# Patient Record
Sex: FEMALE | Race: WHITE | NOT HISPANIC OR LATINO | Employment: UNEMPLOYED | URBAN - METROPOLITAN AREA
[De-identification: names, ages, dates, MRNs, and addresses within clinical notes are randomized per-mention and may not be internally consistent; named-entity substitution may affect disease eponyms.]

---

## 2017-03-29 ENCOUNTER — LAB CONVERSION - ENCOUNTER (OUTPATIENT)
Dept: OTHER | Facility: OTHER | Age: 52
End: 2017-03-29

## 2017-03-29 LAB
ADDITIONAL INFORMATION (HISTORICAL): NORMAL
BILIRUB UR QL STRIP: NEGATIVE
COLOR UR: YELLOW
COMMENT (HISTORICAL): CLEAR
DIAGNOSIS (HISTORICAL): NORMAL
FECAL OCCULT BLOOD DIAGNOSTIC (HISTORICAL): NEGATIVE
GLUCOSE (HISTORICAL): NEGATIVE
GROSS DESCRIPTION (HISTORICAL): NORMAL
KETONES UR STRIP-MCNC: NEGATIVE MG/DL
LEUKOCYTE ESTERASE UR QL STRIP: NEGATIVE
NITRITE UR QL STRIP: NEGATIVE
PATHOLOGIST (HISTORICAL): NORMAL
PH UR STRIP.AUTO: 6 [PH] (ref 5–8)
PROT UR STRIP-MCNC: NEGATIVE MG/DL
SCREEN/SCREENER (HISTORICAL): NORMAL
SITE/SOURCE (HISTORICAL): NORMAL
SP GR UR STRIP.AUTO: 1.03 (ref 1–1.03)

## 2017-04-19 ENCOUNTER — ALLSCRIPTS OFFICE VISIT (OUTPATIENT)
Dept: OTHER | Facility: OTHER | Age: 52
End: 2017-04-19

## 2017-07-06 ENCOUNTER — ALLSCRIPTS OFFICE VISIT (OUTPATIENT)
Dept: OTHER | Facility: OTHER | Age: 52
End: 2017-07-06

## 2017-07-28 ENCOUNTER — GENERIC CONVERSION - ENCOUNTER (OUTPATIENT)
Dept: OTHER | Facility: OTHER | Age: 52
End: 2017-07-28

## 2017-10-30 ENCOUNTER — GENERIC CONVERSION - ENCOUNTER (OUTPATIENT)
Dept: OTHER | Facility: OTHER | Age: 52
End: 2017-10-30

## 2018-01-13 VITALS
BODY MASS INDEX: 35.02 KG/M2 | HEIGHT: 64 IN | HEART RATE: 68 BPM | SYSTOLIC BLOOD PRESSURE: 122 MMHG | DIASTOLIC BLOOD PRESSURE: 84 MMHG | WEIGHT: 205.13 LBS

## 2018-01-13 VITALS
SYSTOLIC BLOOD PRESSURE: 120 MMHG | DIASTOLIC BLOOD PRESSURE: 80 MMHG | WEIGHT: 203.25 LBS | BODY MASS INDEX: 34.7 KG/M2 | HEIGHT: 64 IN

## 2018-04-14 LAB
APPEARANCE UR: CLEAR
BILIRUB UR QL STRIP: NEGATIVE
COLOR UR: YELLOW
GLUCOSE UR QL STRIP: NEGATIVE
HGB UR QL STRIP: NEGATIVE
KETONES UR QL STRIP: NEGATIVE
LEUKOCYTE ESTERASE UR QL STRIP: NEGATIVE
NITRITE UR QL STRIP: NEGATIVE
PH UR STRIP: NORMAL [PH] (ref 5–8)
PROT UR QL STRIP: NEGATIVE
SP GR UR STRIP: 1.02 (ref 1–1.03)

## 2018-04-16 NOTE — PROGRESS NOTES
4/19/2018    Hal Hull  1965  [id number]        Assessment  -Mixed urinary incontinence  -OAB  -Microscopic hematuria s/p negative hematuria workup in 2014    Morena Zhong is a 48 y o  female being managed by Dr Ros Grace  -PVR is 24cc  -We reviewed dietary modifications including limiting bladder irritants which include coffee, increasing oral intake with water, and practicing timed voiding  -Patient will continue to take oxybutynin 15mg daily, but we will attempt to obtain insurance coverage for Myrbetriq 50mg, as she stated marked improvement in lower urinary symptoms of urgency and urge incontinence  Patient had previously been on Detrol LA without any changes in urination  If insurance is unable to cover Myrbetriq, we will further discuss prescribing a different medication such as Toviaz and discontinuing oxybutynin  -All questions answered, patients agree with plan     History of Present Illness  48 y o  female with a history of mixed urinary incontinence, OAB, and microscopic hematuria s/p negative hematuria workup in 2014 presents today for follow up  Patient had previously taken Detrol LA but discontinued at last office visit, as she felt no change in urinary symptoms  Myrbetriq 50mg was prescribed with great success in urinary symptoms, however due to cost and insurance coverage of medication patient is unable to continue  Patient continues to take oxybutynin 15mg daily, but continues to have urinary urgency, frequency, and urge incontinence  She wears up to 3 sanitary pads/day  Denies any nocturia, dysuria, incomplete bladder emptying, or gross hematuria  Patient states rare occurrences of urinary stress incontinence with coughing/sneezing  Review of Systems  Review of Systems   Constitutional: Negative  HENT: Negative  Respiratory: Negative  Cardiovascular: Negative  Gastrointestinal: Negative      Genitourinary: Negative for difficulty urinating, dysuria, flank pain, frequency, hematuria, pelvic pain and urgency  Musculoskeletal: Negative  Skin: Negative  Neurological: Negative  Psychiatric/Behavioral: Negative  Past Medical History  No past medical history on file  Past Social History  No past surgical history on file  Past Family History  No family history on file  Past Social history  Social History     Social History    Marital status: /Civil Union     Spouse name: N/A    Number of children: N/A    Years of education: N/A     Occupational History    Not on file  Social History Main Topics    Smoking status: Not on file    Smokeless tobacco: Not on file    Alcohol use Not on file    Drug use: Unknown    Sexual activity: Not on file     Other Topics Concern    Not on file     Social History Narrative    No narrative on file       Current Medications  No current outpatient prescriptions on file  No current facility-administered medications for this visit  Allergies  Allergies not on file    Past medical history, social history, family history, medications and allergies were reviewed  Vitals  There were no vitals filed for this visit  Physical Exam  Physical Exam   Constitutional: She is oriented to person, place, and time  She appears well-developed and well-nourished  HENT:   Head: Normocephalic  Eyes: Pupils are equal, round, and reactive to light  Neck: Normal range of motion  Cardiovascular: Normal rate and regular rhythm  Pulmonary/Chest: Effort normal    Abdominal: Soft  Normal appearance  There is no CVA tenderness  Musculoskeletal: Normal range of motion  Neurological: She is alert and oriented to person, place, and time  She has normal reflexes  Skin: Skin is warm and dry  Psychiatric: She has a normal mood and affect   Her behavior is normal  Judgment and thought content normal        Results    I have personally reviewed all pertinent lab results  Lab Results Component Value Date    GLUCOSE 79 01/31/2014    CALCIUM 9 1 06/14/2016     06/14/2016    K 4 3 06/14/2016    CO2 25 06/14/2016     06/14/2016    BUN 13 06/14/2016    CREATININE 0 70 06/14/2016     Lab Results   Component Value Date    WBC NONE SEEN 07/28/2016    HGB 14 5 06/14/2016    HCT 44 0 06/14/2016    MCV 90 3 06/14/2016     06/14/2016     No results found for this or any previous visit (from the past 1 hour(s))

## 2018-04-19 ENCOUNTER — ALLSCRIPTS OFFICE VISIT (OUTPATIENT)
Dept: OTHER | Facility: OTHER | Age: 53
End: 2018-04-19

## 2018-04-19 ENCOUNTER — OFFICE VISIT (OUTPATIENT)
Dept: UROLOGY | Facility: AMBULATORY SURGERY CENTER | Age: 53
End: 2018-04-19
Payer: COMMERCIAL

## 2018-04-19 VITALS
BODY MASS INDEX: 35.34 KG/M2 | HEIGHT: 64 IN | DIASTOLIC BLOOD PRESSURE: 77 MMHG | HEART RATE: 68 BPM | SYSTOLIC BLOOD PRESSURE: 122 MMHG | WEIGHT: 207 LBS

## 2018-04-19 DIAGNOSIS — N32.81 OAB (OVERACTIVE BLADDER): Primary | ICD-10-CM

## 2018-04-19 DIAGNOSIS — R31.9 HEMATURIA: ICD-10-CM

## 2018-04-19 DIAGNOSIS — R31.29 MICROSCOPIC HEMATURIA: ICD-10-CM

## 2018-04-19 DIAGNOSIS — N39.46 MIXED STRESS AND URGE URINARY INCONTINENCE: ICD-10-CM

## 2018-04-19 PROCEDURE — 99213 OFFICE O/P EST LOW 20 MIN: CPT | Performed by: NURSE PRACTITIONER

## 2018-04-19 RX ORDER — ESCITALOPRAM OXALATE 5 MG/1
TABLET ORAL
COMMUNITY
Start: 2018-01-26 | End: 2018-12-28 | Stop reason: SDUPTHER

## 2018-04-19 RX ORDER — VALACYCLOVIR HYDROCHLORIDE 500 MG/1
TABLET, FILM COATED ORAL
COMMUNITY
Start: 2016-09-16 | End: 2018-07-09

## 2018-04-19 RX ORDER — ESCITALOPRAM OXALATE 10 MG/1
TABLET ORAL
COMMUNITY
Start: 2018-01-26 | End: 2018-12-28 | Stop reason: SDUPTHER

## 2018-04-19 RX ORDER — METOPROLOL SUCCINATE 25 MG/1
12.5 TABLET, EXTENDED RELEASE ORAL
COMMUNITY
Start: 2017-09-19 | End: 2021-07-26

## 2018-04-19 RX ORDER — CLONAZEPAM 0.5 MG/1
TABLET ORAL
COMMUNITY
Start: 2017-09-11 | End: 2018-12-28 | Stop reason: SDUPTHER

## 2018-04-19 RX ORDER — OXYBUTYNIN CHLORIDE 15 MG/1
TABLET, EXTENDED RELEASE ORAL
COMMUNITY
Start: 2017-10-09 | End: 2018-04-20 | Stop reason: ALTCHOICE

## 2018-04-20 ENCOUNTER — TELEPHONE (OUTPATIENT)
Dept: UROLOGY | Facility: AMBULATORY SURGERY CENTER | Age: 53
End: 2018-04-20

## 2018-04-20 DIAGNOSIS — N32.81 OAB (OVERACTIVE BLADDER): Primary | ICD-10-CM

## 2018-04-20 RX ORDER — FESOTERODINE FUMARATE 8 MG/1
8 TABLET, EXTENDED RELEASE ORAL DAILY
Qty: 30 TABLET | Refills: 11 | Status: SHIPPED | OUTPATIENT
Start: 2018-04-20 | End: 2018-07-09

## 2018-04-20 RX ORDER — TOLTERODINE 4 MG/1
4 CAPSULE, EXTENDED RELEASE ORAL DAILY
Qty: 30 CAPSULE | Refills: 11 | Status: SHIPPED | OUTPATIENT
Start: 2018-04-20 | End: 2018-04-20 | Stop reason: CLARIF

## 2018-04-20 NOTE — TELEPHONE ENCOUNTER
Discontinued Myrbetriq  Prescription sent to patient's pharmacy for Detrol  Patient can be seen in 1 year follow up as recent PVR was negative for any significant findings, otherwise sooner if needed

## 2018-04-20 NOTE — TELEPHONE ENCOUNTER
Darshan Hernandez  This patients Myrbetriq is approved with the insurance good from 4/20/18-4/20/19, patient was notified  Thanks  Janes Malloy    4/20 Patient called me back regarding the medication she has a 7000 deductible and the cost would be 300 dollars for the medication I did let her know that we couldn't do anything about the copay due to that being her deductible for her medications  She would like a phone call to talk about another cheaper medication    Thanks  Janes Malloy

## 2018-05-16 DIAGNOSIS — R35.0 FREQUENCY OF URINATION: Primary | ICD-10-CM

## 2018-05-16 RX ORDER — OXYBUTYNIN CHLORIDE 15 MG/1
TABLET, EXTENDED RELEASE ORAL
Qty: 30 TABLET | Refills: 0 | Status: SHIPPED | OUTPATIENT
Start: 2018-05-16 | End: 2018-07-13 | Stop reason: SDUPTHER

## 2018-07-09 ENCOUNTER — OFFICE VISIT (OUTPATIENT)
Dept: FAMILY MEDICINE CLINIC | Facility: CLINIC | Age: 53
End: 2018-07-09
Payer: COMMERCIAL

## 2018-07-09 VITALS
DIASTOLIC BLOOD PRESSURE: 76 MMHG | SYSTOLIC BLOOD PRESSURE: 132 MMHG | HEIGHT: 64 IN | WEIGHT: 209.6 LBS | BODY MASS INDEX: 35.78 KG/M2 | TEMPERATURE: 97.8 F

## 2018-07-09 DIAGNOSIS — L50.9 HIVES: ICD-10-CM

## 2018-07-09 DIAGNOSIS — T78.40XA ALLERGIC DISORDER, INITIAL ENCOUNTER: Primary | ICD-10-CM

## 2018-07-09 PROCEDURE — 3008F BODY MASS INDEX DOCD: CPT | Performed by: FAMILY MEDICINE

## 2018-07-09 PROCEDURE — 99213 OFFICE O/P EST LOW 20 MIN: CPT | Performed by: FAMILY MEDICINE

## 2018-07-09 RX ORDER — FLUTICASONE PROPIONATE 0.05 %
CREAM (GRAM) TOPICAL 2 TIMES DAILY
Qty: 30 G | Refills: 0 | Status: SHIPPED | OUTPATIENT
Start: 2018-07-09 | End: 2019-09-17 | Stop reason: ALTCHOICE

## 2018-07-09 NOTE — PROGRESS NOTES
Assessment/Plan:           Problem List Items Addressed This Visit     None      Visit Diagnoses     Allergic disorder, initial encounter    -  Primary    Relevant Medications    fluticasone (CUTIVATE) 0 05 % cream    Hives        Relevant Medications    fluticasone (CUTIVATE) 0 05 % cream            Subjective:      Patient ID: Lisa Sims is a 48 y o  female  Patient presents with: Insect Bite: Patient noticed tick on upper right thigh about one week ago, she has tick bottled with her  Rash: Patient states that 2-3 days after taking tick off thigh she noticed raised, itchy rash on left knee and subsequently on left upper arm          Insect Bite   Associated symptoms include a rash  Rash         The following portions of the patient's history were reviewed and updated as appropriate: allergies, current medications, past family history, past medical history, past social history, past surgical history and problem list     Review of Systems   Constitutional: Negative  HENT: Negative  Eyes: Negative  Respiratory: Negative  Cardiovascular: Negative  Gastrointestinal: Negative  Endocrine: Negative  Genitourinary: Negative  Musculoskeletal: Negative  Skin: Positive for rash  Allergic/Immunologic: Negative  Neurological: Negative  Hematological: Negative  Psychiatric/Behavioral: Negative  All other systems reviewed and are negative  Objective:      /76 (BP Location: Right arm, Patient Position: Sitting, Cuff Size: Adult)   Temp 97 8 °F (36 6 °C) (Tympanic)   Ht 5' 4 25" (1 632 m)   Wt 95 1 kg (209 lb 9 6 oz)   LMP 06/25/2018 (Approximate)   BMI 35 70 kg/m²          Physical Exam   Constitutional: She is oriented to person, place, and time  She appears well-developed and well-nourished  HENT:   Head: Normocephalic and atraumatic     Right Ear: External ear normal    Left Ear: External ear normal    Nose: Nose normal    Mouth/Throat: Oropharynx is clear and moist    Eyes: Conjunctivae and EOM are normal  Pupils are equal, round, and reactive to light  Neck: Normal range of motion  Neck supple  Cardiovascular: Normal rate, regular rhythm and normal heart sounds  Pulmonary/Chest: Effort normal and breath sounds normal    Abdominal: Soft  Bowel sounds are normal    Musculoskeletal: Normal range of motion  Neurological: She is alert and oriented to person, place, and time  She has normal reflexes  Skin: Skin is warm and dry  Rash noted  Psychiatric: She has a normal mood and affect  Her behavior is normal    Nursing note and vitals reviewed

## 2018-07-13 DIAGNOSIS — R35.0 FREQUENCY OF URINATION: ICD-10-CM

## 2018-07-13 RX ORDER — OXYBUTYNIN CHLORIDE 15 MG/1
TABLET, EXTENDED RELEASE ORAL
Qty: 30 TABLET | Refills: 0 | Status: SHIPPED | OUTPATIENT
Start: 2018-07-13 | End: 2018-09-14 | Stop reason: SDUPTHER

## 2018-09-14 DIAGNOSIS — R35.0 FREQUENCY OF URINATION: ICD-10-CM

## 2018-09-17 RX ORDER — OXYBUTYNIN CHLORIDE 15 MG/1
TABLET, EXTENDED RELEASE ORAL
Qty: 30 TABLET | Refills: 0 | Status: SHIPPED | OUTPATIENT
Start: 2018-09-17 | End: 2018-11-19 | Stop reason: SDUPTHER

## 2018-11-19 DIAGNOSIS — R35.0 FREQUENCY OF URINATION: ICD-10-CM

## 2018-11-19 RX ORDER — OXYBUTYNIN CHLORIDE 15 MG/1
TABLET, EXTENDED RELEASE ORAL
Qty: 30 TABLET | Refills: 0 | Status: SHIPPED | OUTPATIENT
Start: 2018-11-19 | End: 2019-01-07 | Stop reason: SDUPTHER

## 2018-11-26 ENCOUNTER — OFFICE VISIT (OUTPATIENT)
Dept: FAMILY MEDICINE CLINIC | Facility: CLINIC | Age: 53
End: 2018-11-26
Payer: COMMERCIAL

## 2018-11-26 VITALS
BODY MASS INDEX: 33.12 KG/M2 | TEMPERATURE: 98 F | DIASTOLIC BLOOD PRESSURE: 82 MMHG | HEIGHT: 64 IN | SYSTOLIC BLOOD PRESSURE: 110 MMHG | WEIGHT: 194 LBS

## 2018-11-26 DIAGNOSIS — T78.40XA ALLERGIC REACTION, INITIAL ENCOUNTER: Primary | ICD-10-CM

## 2018-11-26 PROCEDURE — 3008F BODY MASS INDEX DOCD: CPT | Performed by: FAMILY MEDICINE

## 2018-11-26 PROCEDURE — 1036F TOBACCO NON-USER: CPT | Performed by: FAMILY MEDICINE

## 2018-11-26 PROCEDURE — 99213 OFFICE O/P EST LOW 20 MIN: CPT | Performed by: FAMILY MEDICINE

## 2018-11-26 NOTE — PROGRESS NOTES
Assessment/Plan:   I suggested she continue to use Benadryl or a nonsedating antihistamine  If the symptoms worsen the arm becomes more painful red or swollen she should return to office  She is still considering getting the second shingles vaccine  I did recommend she premedicate with 50 mg of Benadryl before she gets the shot  She should not drive to the pharmacy but have her  or someone else drive her  She should make sure she is with someone the entire day after receiving the musician  Follow up here as needed  Diagnoses and all orders for this visit:    Allergic reaction, initial encounter          Subjective:      Patient ID: Jr Pritchett is a 48 y o  female  Patient presents with:  Pain: Patient states she had the shingrix shot on Tuesday  She states two days after she had the shot administered her left arm started redness and swelling  Its hot to the touch with pain and numbness  She states it has been spreading down her arm from the injection site down past the elbow  The following portions of the patient's history were reviewed and updated as appropriate: allergies, current medications, past family history, past medical history, past social history, past surgical history and problem list     Review of Systems   Constitutional: Negative  HENT: Negative  Eyes: Negative  Respiratory: Negative  Cardiovascular: Negative  Gastrointestinal: Negative  Endocrine: Negative  Genitourinary: Negative  Musculoskeletal: Negative  Left arm pain and swelling  Skin: Negative  Allergic/Immunologic: Negative  Neurological: Negative  Hematological: Negative  Psychiatric/Behavioral: Negative  All other systems reviewed and are negative          Objective:      /82 (BP Location: Right arm, Patient Position: Sitting, Cuff Size: Large)   Temp 98 °F (36 7 °C)   Ht 5' 4 25" (1 632 m)   Wt 88 kg (194 lb)   BMI 33 04 kg/m² Physical Exam   Constitutional: She is oriented to person, place, and time  She appears well-developed and well-nourished  HENT:   Head: Normocephalic and atraumatic  Right Ear: External ear normal    Left Ear: External ear normal    Nose: Nose normal    Mouth/Throat: Oropharynx is clear and moist    Eyes: Pupils are equal, round, and reactive to light  Conjunctivae and EOM are normal    Neck: Normal range of motion  Neck supple  Cardiovascular: Normal rate, regular rhythm and normal heart sounds  Pulmonary/Chest: Effort normal and breath sounds normal    Abdominal: Soft  Bowel sounds are normal    Musculoskeletal: Normal range of motion  Left arm is swollen in the area of the triceps  There is some erythema  Neurovascular appears intact  Neurological: She is alert and oriented to person, place, and time  She has normal reflexes  Skin: Skin is warm and dry  There is erythema  Psychiatric: She has a normal mood and affect  Her behavior is normal    Nursing note and vitals reviewed

## 2018-12-28 ENCOUNTER — OFFICE VISIT (OUTPATIENT)
Dept: FAMILY MEDICINE CLINIC | Facility: CLINIC | Age: 53
End: 2018-12-28
Payer: COMMERCIAL

## 2018-12-28 VITALS
DIASTOLIC BLOOD PRESSURE: 76 MMHG | WEIGHT: 189 LBS | TEMPERATURE: 98.5 F | HEIGHT: 64 IN | BODY MASS INDEX: 32.27 KG/M2 | SYSTOLIC BLOOD PRESSURE: 112 MMHG

## 2018-12-28 DIAGNOSIS — Z23 ENCOUNTER FOR VACCINATION: Primary | ICD-10-CM

## 2018-12-28 DIAGNOSIS — G89.29 CHRONIC PAIN OF RIGHT ANKLE: ICD-10-CM

## 2018-12-28 DIAGNOSIS — F41.0 PANIC DISORDER: ICD-10-CM

## 2018-12-28 DIAGNOSIS — M25.571 CHRONIC PAIN OF RIGHT ANKLE: ICD-10-CM

## 2018-12-28 DIAGNOSIS — F33.1 MAJOR DEPRESSIVE DISORDER, RECURRENT EPISODE, MODERATE (HCC): ICD-10-CM

## 2018-12-28 PROBLEM — S86.112A STRAIN OF GASTROCNEMIUS MUSCLE OF LEFT LOWER EXTREMITY: Status: ACTIVE | Noted: 2017-07-06

## 2018-12-28 PROBLEM — I49.3 ASYMPTOMATIC PVCS: Status: ACTIVE | Noted: 2018-10-02

## 2018-12-28 PROBLEM — R32 URINARY INCONTINENCE IN FEMALE: Status: ACTIVE | Noted: 2017-10-09

## 2018-12-28 PROCEDURE — 99214 OFFICE O/P EST MOD 30 MIN: CPT | Performed by: FAMILY MEDICINE

## 2018-12-28 PROCEDURE — 1036F TOBACCO NON-USER: CPT | Performed by: FAMILY MEDICINE

## 2018-12-28 RX ORDER — ESCITALOPRAM OXALATE 10 MG/1
10 TABLET ORAL DAILY
Qty: 90 TABLET | Refills: 1 | Status: SHIPPED | OUTPATIENT
Start: 2018-12-28 | End: 2019-09-23 | Stop reason: SDUPTHER

## 2018-12-28 RX ORDER — ESCITALOPRAM OXALATE 5 MG/1
5 TABLET ORAL DAILY
Qty: 90 TABLET | Refills: 1 | Status: SHIPPED | OUTPATIENT
Start: 2018-12-28 | End: 2019-09-23 | Stop reason: SDUPTHER

## 2018-12-28 RX ORDER — CLONAZEPAM 0.5 MG/1
0.5 TABLET ORAL 2 TIMES DAILY
Qty: 60 TABLET | Refills: 0 | Status: SHIPPED | OUTPATIENT
Start: 2018-12-28 | End: 2021-07-26

## 2018-12-28 NOTE — PROGRESS NOTES
Assessment/Plan:    70-year-old woman with:  Right ankle pain, major depressive disorder and panic disorder  Discussed workup and treatment options with risks and benefits  Will continue current medications  Discussed heat and cold stretching and anti-inflammatories  Will check x-ray and refer to physical therapy and advised to call back if not improving or worsening    No problem-specific Assessment & Plan notes found for this encounter  Diagnoses and all orders for this visit:    Encounter for vaccination    Major depressive disorder, recurrent episode, moderate (HCC)  -     escitalopram (LEXAPRO) 5 mg tablet; Take 1 tablet (5 mg total) by mouth daily for 90 days  -     escitalopram (LEXAPRO) 10 mg tablet; Take 1 tablet (10 mg total) by mouth daily  -     clonazePAM (KlonoPIN) 0 5 mg tablet; Take 1 tablet (0 5 mg total) by mouth 2 (two) times a day    Chronic pain of right ankle  -     XR ankle 3+ vw right; Future  -     Ambulatory referral to Physical Therapy; Future    Panic disorder    Other orders  -     Cancel: PNEUMOCOCCAL POLYSACCHARIDE VACCINE 23-VALENT =>1YO SQ IM          Subjective:     Chief Complaint   Patient presents with    Ankle Pain     Patient states she has been having right ankle pain for the past two years with no known cause of injury  She states the pain has gotten steadily worse for the past 4 months   Medication Refill     Patient requests a refill on lexapro and Klonopin because her doctor moved  Patient ID: Parisa Patrick is a 48 y o  female  Patient is a 70-year-old woman who presents complaining of 2 years of right ankle pain intermittently gradually getting worse  No weakness numbness or tingling  No specific precipitating trauma  Patient tried some anti-inflammatories which provided little relief  She also has major depressive disorder and panic disorder and admits that she needs refills of her medications    She admits being stable on them and denies other complaints at this time  The following portions of the patient's history were reviewed and updated as appropriate: allergies, current medications, past family history, past medical history, past social history, past surgical history and problem list     Review of Systems   Constitutional: Negative  HENT: Negative  Eyes: Negative  Respiratory: Negative  Cardiovascular: Negative  Gastrointestinal: Negative  Endocrine: Negative  Genitourinary: Negative  Musculoskeletal: Positive for arthralgias  Allergic/Immunologic: Negative  Neurological: Negative  Hematological: Negative  Psychiatric/Behavioral: The patient is nervous/anxious  All other systems reviewed and are negative  Objective:      /76 (BP Location: Left arm, Patient Position: Sitting, Cuff Size: Large)   Temp 98 5 °F (36 9 °C)   Ht 5' 4 25" (1 632 m)   Wt 85 7 kg (189 lb)   BMI 32 19 kg/m²          Physical Exam   Constitutional: She is oriented to person, place, and time  She appears well-developed and well-nourished  HENT:   Head: Atraumatic  Right Ear: External ear normal    Left Ear: External ear normal    Eyes: Pupils are equal, round, and reactive to light  Conjunctivae and EOM are normal    Neck: Normal range of motion  Pulmonary/Chest: Effort normal  No respiratory distress  Musculoskeletal: Normal range of motion  Right ankle tenderness around the medial malleolus  Tenderness with pronation  Neurological: She is alert and oriented to person, place, and time  No cranial nerve deficit  Skin: Skin is warm and dry  Psychiatric: She has a normal mood and affect   Her behavior is normal  Judgment and thought content normal

## 2019-01-03 ENCOUNTER — EVALUATION (OUTPATIENT)
Dept: PHYSICAL THERAPY | Facility: CLINIC | Age: 54
End: 2019-01-03
Payer: COMMERCIAL

## 2019-01-03 DIAGNOSIS — M25.571 CHRONIC PAIN OF RIGHT ANKLE: Primary | ICD-10-CM

## 2019-01-03 DIAGNOSIS — G89.29 CHRONIC PAIN OF RIGHT ANKLE: Primary | ICD-10-CM

## 2019-01-03 PROCEDURE — 97140 MANUAL THERAPY 1/> REGIONS: CPT | Performed by: PHYSICAL MEDICINE & REHABILITATION

## 2019-01-03 PROCEDURE — G8978 MOBILITY CURRENT STATUS: HCPCS | Performed by: PHYSICAL MEDICINE & REHABILITATION

## 2019-01-03 PROCEDURE — 97162 PT EVAL MOD COMPLEX 30 MIN: CPT | Performed by: PHYSICAL MEDICINE & REHABILITATION

## 2019-01-03 PROCEDURE — G8979 MOBILITY GOAL STATUS: HCPCS | Performed by: PHYSICAL MEDICINE & REHABILITATION

## 2019-01-03 NOTE — PROGRESS NOTES
PT Evaluation     Today's date: 1/3/2019  Patient name: Rolando Richard  : 1965  MRN: [id number]  Referring provider: Eriberto Collado MD  Dx:   Encounter Diagnosis     ICD-10-CM    1  Chronic pain of right ankle M25 571 Ambulatory referral to Physical Therapy    G89 29                   Assessment  Assessment details: Patient is a 48 y o  female who reports to outpatient physical therapy with right chronic ankle pain  No further referral appears necessary at this time based upon examination results  Probable movement impairment diagnosis of ankle hypomobility with lumbar decreased muscle coordination resulting in pathoanatomical symptoms of pain, decreased ROM, decreased weight bearing and decreased strength which are limiting her ability to exercise - walking - yard work and stairs  Skilled physical therapy is warranted for the application of the interventions found below in the planned intervention section  Prognosis is good given HEP compliance and attendance to physical therapy 2x for 8 weeks  Please contact me if you have any questions or recommendations  Thank you for the referral and the opportunity to share in [facility]  Patient verbalized understanding of POC, HEP, and return demonstrated HEP  Impairments: abnormal coordination, abnormal gait, abnormal muscle firing, abnormal or restricted ROM, abnormal movement, activity intolerance, impaired balance, impaired physical strength, lacks appropriate home exercise program, pain with function and weight-bearing intolerance  Understanding of Dx/Px/POC: good   Prognosis: good    Goals  Impairment Goals  - Decrease pain by 50% within 4 weeks  - Increase right flexibility by 50% within 4 weeks  - Increase right lower extremity strength golbally to 4+/5 within 4 weeks  Functional Goals  - Return to Prior Level of Function within 8 weeks  - Patient will be independent with HEP within 8 weeks      Plan  Patient would benefit from: skilled PT  Referral necessary: No  Planned modality interventions: cryotherapy, thermotherapy: hydrocollator packs and TENS  Planned therapy interventions: joint mobilization, manual therapy, neuromuscular re-education, patient education, strengthening, stretching, therapeutic activities, therapeutic exercise, home exercise program, functional ROM exercises, Bai taping, postural training, massage, balance and flexibility  Frequency: 2x week (2-3x week)  Duration in weeks: 6  Treatment plan discussed with: patient        Subjective Evaluation    History of Present Illness  Mechanism of injury: Work/School: not currently working  Home Life: cooking, cleaning, yard work, sometimes has to stand for long periods of time,   Hobbies/exercise: yoga, walking,   Pain location: posterior and medial ankle, superior to medial malleolus  HPI: Had ankle pain two years ago  The pain went away but then came back worse than previously     Aggravating factors: walking in the morning, sitting for longer periods of time, will sometimes feel the pain in the posterior of the calf, does not seem impacted by foot wear,    Relieving factors: 2 allieve in morning - but hasn't done that consistently  PMH: hyperlipidemia, lumbar pain,   Pain  Current pain ratin  At best pain ratin  At worst pain ratin    Patient Goals  Patient goal: be able to do more without having pain        Objective     Static Posture     Comments  Ankle ROM: PROM  DF: R: 4 L: 8  EV: R: 8 L: 15  IV: R: 16 L: 25  PF: R: 27 L: WNL    MMTs:  DF: R: 4/5 L: 5/5  EV: R: 4/5  L: 5/5  IV: R: 3+/5 L: 5/5  PF: R: 3+/5  L: 4/5     Clinical examination test:  Anterior drawer: -  [place] Ankle Rules: -  Windlass: - for pain and for arch creation    Palpation:  Tenderness medial malleolus    Lumbar screening due to subjective report of hx of lumbar pain:  Flexion: 25% limited - increased ankle pain  Extension 25% limited - increased ankle pain    Repeated motions  Prone extension - decreased ankle pain       Flowsheet Rows      Most Recent Value   PT/OT G-Codes   Current Score  66   Projected Score  75   FOTO information reviewed  Yes   Assessment Type  Evaluation   G code set  Mobility: Walking & Moving Around   Mobility: Walking and Moving Around Current Status ()  CJ   Mobility: Walking and Moving Around Goal Status ()  CH          Precautions: none    Daily Treatment Diary     Manual  1/3/2019            IASTM 10'                                                                    Exercise Diary                                                                                                                                                                                                                                                                     HEP return demonstration and progression 10'                Modalities

## 2019-01-07 ENCOUNTER — OFFICE VISIT (OUTPATIENT)
Dept: PHYSICAL THERAPY | Facility: CLINIC | Age: 54
End: 2019-01-07
Payer: COMMERCIAL

## 2019-01-07 DIAGNOSIS — G89.29 CHRONIC PAIN OF RIGHT ANKLE: Primary | ICD-10-CM

## 2019-01-07 DIAGNOSIS — M25.571 CHRONIC PAIN OF RIGHT ANKLE: Primary | ICD-10-CM

## 2019-01-07 DIAGNOSIS — R35.0 FREQUENCY OF URINATION: ICD-10-CM

## 2019-01-07 PROCEDURE — 97112 NEUROMUSCULAR REEDUCATION: CPT | Performed by: PHYSICAL MEDICINE & REHABILITATION

## 2019-01-07 RX ORDER — OXYBUTYNIN CHLORIDE 15 MG/1
TABLET, EXTENDED RELEASE ORAL
Qty: 30 TABLET | Refills: 0 | Status: SHIPPED | OUTPATIENT
Start: 2019-01-07 | End: 2019-03-18 | Stop reason: SDUPTHER

## 2019-01-07 NOTE — PROGRESS NOTES
Daily Note     Today's date: 2019  Patient name: Rolando Richard  : 1965  MRN: [id number]  Referring provider: Eriberto Collado MD  Dx:   Encounter Diagnosis     ICD-10-CM    1  Chronic pain of right ankle M25 571     G89 29                 Subjective: Nitish ahn reported "I am having some pain, but not as much as before "    Objective: See treatment diary below  Precautions: none    Daily Treatment Diary     Manual  1/3/2019 2019           IASTM 10'            Overpressure REIP  8'                                                      Exercise Diary              REIP  3x10           Self ankle DF mobs  2x10           Neural glides  3x10           TrA contractions  3x10                                                                                                                                                                                                              HEP return demonstration and progression 10'                Modalities                                                         Assessment: Tolerated treatment well  Patient would benefit from continued PT  Rolando Richard was able to initiate her therapy program which shows progress towards her goals  She continues to demonstrate signs and symptoms of posterior derangement and her symptoms are alleviated via extension bias exercises  Plan: Continue per plan of care

## 2019-01-10 ENCOUNTER — OFFICE VISIT (OUTPATIENT)
Dept: PHYSICAL THERAPY | Facility: CLINIC | Age: 54
End: 2019-01-10
Payer: COMMERCIAL

## 2019-01-10 DIAGNOSIS — M25.571 CHRONIC PAIN OF RIGHT ANKLE: Primary | ICD-10-CM

## 2019-01-10 DIAGNOSIS — G89.29 CHRONIC PAIN OF RIGHT ANKLE: Primary | ICD-10-CM

## 2019-01-10 PROCEDURE — 97140 MANUAL THERAPY 1/> REGIONS: CPT | Performed by: PHYSICAL MEDICINE & REHABILITATION

## 2019-01-10 NOTE — PROGRESS NOTES
Daily Note     Today's date: 1/10/2019  Patient name: Masoud Hanley  : 1965  MRN: [id number]  Referring provider: Alejo Long MD  Dx:   Encounter Diagnosis     ICD-10-CM    1  Chronic pain of right ankle M25 571     G89 29                 Subjective: Savita Rodney reported "I had a rough couple last few days "    Objective: See treatment diary below  Precautions: none    Daily Treatment Diary     Manual  1/3/2019 2019 1/10/2019          IASTM 10'  10'          Overpressure REIP  8'           Ankle distraction   JR                                        Exercise Diary              REIP  3x10           Self ankle DF mobs  2x10 On hold          Neural glides  3x10 3x10          TrA contractions  3x10 3x10          Calf stretch   3x20"                                                                                                                                                                                                HEP return demonstration and progression 10'                Modalities                                                         Assessment: Tolerated treatment well  Patient would benefit from continued PT  Savita Rodney was able to add a calf stretch to her program and reported "I feel better after being here than I did before " She continues to demonstrate decreased ankle DF but demonstrates increased proper gait following treatment  Plan: Continue per plan of care

## 2019-01-14 ENCOUNTER — OFFICE VISIT (OUTPATIENT)
Dept: PHYSICAL THERAPY | Facility: CLINIC | Age: 54
End: 2019-01-14
Payer: COMMERCIAL

## 2019-01-14 DIAGNOSIS — M25.571 CHRONIC PAIN OF RIGHT ANKLE: Primary | ICD-10-CM

## 2019-01-14 DIAGNOSIS — G89.29 CHRONIC PAIN OF RIGHT ANKLE: Primary | ICD-10-CM

## 2019-01-14 PROCEDURE — 97140 MANUAL THERAPY 1/> REGIONS: CPT | Performed by: PHYSICAL MEDICINE & REHABILITATION

## 2019-01-14 NOTE — PROGRESS NOTES
Daily Note     Today's date: 2019  Patient name: Roel John  : 1965  MRN: [id number]  Referring provider: Thomas Montgomery MD  Dx:   Encounter Diagnosis     ICD-10-CM    1  Chronic pain of right ankle M25 571     G89 29                   Subjective: Quiana Oscar reported "I am doing better today than I was the last visit "      Objective: See treatment diary below  Precautions: none    Daily Treatment Diary     Manual  1/3/2019 2019 1/10/2019 2019         IASTM 10'  10'          Overpressure REIP  8'           Ankle distraction   Jami Rivas         STM lumbar spine    JR                          Exercise Diary              REIP  3x10  3x10         Self ankle DF mobs  2x10 On hold          Neural glides  3x10 3x10 3x10         TrA contractions  3x10 3x10 3x10         Calf stretch   3x20" 3x20"         Seated Hamstring stretch    3x30"                                                                                                                                                                                  HEP return demonstration and progression 10'                Modalities                                                         Assessment: Tolerated treatment well  Patient would benefit from continued PT  Quiana Oscar was able to add seated hamstring stretches to her therapy program which shows progress towards her goals  She continues to report ankle relief following extension exercises  During today's session she report relief at her back with ankle distraction  Plan: Continue per plan of care  Next session consider lumbar distraction techniques

## 2019-01-17 ENCOUNTER — OFFICE VISIT (OUTPATIENT)
Dept: PHYSICAL THERAPY | Facility: CLINIC | Age: 54
End: 2019-01-17
Payer: COMMERCIAL

## 2019-01-17 DIAGNOSIS — G89.29 CHRONIC PAIN OF RIGHT ANKLE: Primary | ICD-10-CM

## 2019-01-17 DIAGNOSIS — M25.571 CHRONIC PAIN OF RIGHT ANKLE: Primary | ICD-10-CM

## 2019-01-17 PROCEDURE — 97140 MANUAL THERAPY 1/> REGIONS: CPT | Performed by: PHYSICAL MEDICINE & REHABILITATION

## 2019-01-17 NOTE — PROGRESS NOTES
Daily Note     Today's date: 2019  Patient name: Haris Hampton  : 1965  MRN: [id number]  Referring provider: Neela Santoyo MD  Dx:   Encounter Diagnosis     ICD-10-CM    1  Chronic pain of right ankle M25 571     G89 29        Start Time: 930  Stop Time: 1001  Total time in clinic (min): 31 minutes    Subjective: Louise Mccartney reported "My ankle doesn't seem better than last time "    Objective: See treatment diary below  Precautions: none    Daily Treatment Diary     Manual  1/3/2019 2019 1/10/2019 2019 2019        IASTM 10'  10'          Overpressure REIP  8'           Ankle distraction   Waneta Dace        STM lumbar spine    Mart Douse                         Exercise Diary              REIP  3x10  3x10         Self ankle DF mobs  2x10 On hold          Neural glides  3x10 3x10 3x10 3x10        TrA contractions  3x10 3x10 3x10 3x10        Calf stretch   3x20" 3x20" 3x20"        Seated Hamstring stretch    3x30" 3x30"        Double knee to chest     3x10                                                                                                                                                                    HEP return demonstration and progression 10'                Modalities                                                         Assessment: Tolerated treatment well  Patient would benefit from continued PT  Louise Mccartney was able to add double knee to chest to her therapy program which shows progress towards her goals  Following double knee to chest she reported decreased pain while walking  Plan: Continue per plan of care

## 2019-01-21 ENCOUNTER — OFFICE VISIT (OUTPATIENT)
Dept: PHYSICAL THERAPY | Facility: CLINIC | Age: 54
End: 2019-01-21
Payer: COMMERCIAL

## 2019-01-21 DIAGNOSIS — G89.29 CHRONIC PAIN OF RIGHT ANKLE: Primary | ICD-10-CM

## 2019-01-21 DIAGNOSIS — M25.571 CHRONIC PAIN OF RIGHT ANKLE: Primary | ICD-10-CM

## 2019-01-21 PROCEDURE — 97140 MANUAL THERAPY 1/> REGIONS: CPT | Performed by: PHYSICAL MEDICINE & REHABILITATION

## 2019-01-21 NOTE — PROGRESS NOTES
Daily Note     Today's date: 2019  Patient name: Shahana Stringer  : 1965  MRN: [id number]  Referring provider: Malik Lockhart MD  Dx:   Encounter Diagnosis     ICD-10-CM    1  Chronic pain of right ankle M25 571     G89 29                   Subjective: Nitish ahn reported "I had a confusing weekend, sometimes the exercises helped and sometimes it didn't "    Objective: See treatment diary below  Precautions: none    Daily Treatment Diary     Manual  1/3/2019 2019 1/10/2019 2019 2019 2019       IASTM 10'  10'          Overpressure REIP  8'           Ankle distraction   May Addison       STM lumbar spine    JR JR        Lateral hip distraction      JR           Exercise Diary              REIP  3x10  3x10         Self ankle DF mobs  2x10 On hold          Neural glides  3x10 3x10 3x10 3x10 3x10       TrA contractions  3x10 3x10 3x10 3x10 3x10       Calf stretch   3x20" 3x20" 3x20"        Seated Hamstring stretch    3x30" 3x30"        Double knee to chest     3x10 3x10                                                                                                                                                                   HEP return demonstration and progression 10'                Modalities                                                         Assessment: Tolerated treatment well  Patient would benefit from continued PT  Nitish ahn was able to add seated flexion to her program and reported a decrease in sensation along her medial ankle which shows progress towards her goals  Next session consider right leg on chair repeated flexion  Plan: Continue per plan of care

## 2019-01-24 ENCOUNTER — OFFICE VISIT (OUTPATIENT)
Dept: PHYSICAL THERAPY | Facility: CLINIC | Age: 54
End: 2019-01-24
Payer: COMMERCIAL

## 2019-01-24 DIAGNOSIS — M25.571 CHRONIC PAIN OF RIGHT ANKLE: Primary | ICD-10-CM

## 2019-01-24 DIAGNOSIS — G89.29 CHRONIC PAIN OF RIGHT ANKLE: Primary | ICD-10-CM

## 2019-01-24 PROCEDURE — 97140 MANUAL THERAPY 1/> REGIONS: CPT | Performed by: PHYSICAL MEDICINE & REHABILITATION

## 2019-01-24 NOTE — PROGRESS NOTES
Daily Note     Today's date: 2019  Patient name: Lay Elder  : 1965  MRN: [id number]  Referring provider: Christel Mccartney MD  Dx:   Encounter Diagnosis     ICD-10-CM    1  Chronic pain of right ankle M25 571     G89 29                   Subjective: Asad Beard reported "I have felt better the last few days, the pain is still there but it is not as bad as it was "    Objective: See treatment diary below  Precautions: none    Daily Treatment Diary     Manual  1/3/2019 2019 1/10/2019 2019 2019 2019 2019      IASTM 10'  10'          Overpressure REIP  8'           Ankle distraction   JR Ruben Stover      [facility] lumbar spine    JR SCOTT        Lateral hip distraction      Glenys Mcfadden          Exercise Diary              REIP  3x10  3x10         Self ankle DF mobs  2x10 On hold          Neural glides  3x10 3x10 3x10 3x10 3x10 3x10      TrA contractions  3x10 3x10 3x10 3x10 3x10 3x10      Calf stretch   3x20" 3x20" 3x20"  3x20" with a strap      Seated Hamstring stretch    3x30" 3x30"        Double knee to chest     3x10 3x10 3x10      Standing Right hip flexion lumbar flexion       3x10                                                                                                                                                     HEP return demonstration and progression 10'                Modalities                                                         Assessment: Tolerated treatment well  Patient would benefit from continued PT  Asad Beard was able to add standing lumbar flexion and reported a decrease in symptoms following the new exercise which shows progress towards her goals  She is currently reporting centralization with flexion based exercises  Plan: Continue per plan of care

## 2019-01-28 ENCOUNTER — OFFICE VISIT (OUTPATIENT)
Dept: PHYSICAL THERAPY | Facility: CLINIC | Age: 54
End: 2019-01-28
Payer: COMMERCIAL

## 2019-01-28 DIAGNOSIS — G89.29 CHRONIC PAIN OF RIGHT ANKLE: Primary | ICD-10-CM

## 2019-01-28 DIAGNOSIS — M25.571 CHRONIC PAIN OF RIGHT ANKLE: Primary | ICD-10-CM

## 2019-01-28 PROCEDURE — 97140 MANUAL THERAPY 1/> REGIONS: CPT | Performed by: PHYSICAL MEDICINE & REHABILITATION

## 2019-01-28 NOTE — PROGRESS NOTES
Daily Note     Today's date: 2019  Patient name: Gerardo Greco  : 1965  MRN: [id number]  Referring provider: Hui Lr MD  Dx:   Encounter Diagnosis     ICD-10-CM    1  Chronic pain of right ankle M25 571     G89 29                 Subjective: Salvador Valero reported "I did not have a good weekend, my foot bothered me again "    Objective: See treatment diary below  Precautions: none    Daily Treatment Diary     Manual  1/3/2019 2019 1/10/2019 2019 2019 2019 2019 2019     IASTM 10'  10'          Overpressure REIP  8'           Ankle distraction   JR Dyan Ibanez      STM lumbar spine    JR JR        Manual Lumbar flex        JR 1' holds     Lateral hip distraction      Sung Priestly         Exercise Diary              REIP  3x10  3x10         Self ankle DF mobs  2x10 On hold          Neural glides  3x10 3x10 3x10 3x10 3x10 3x10 3x10     TrA contractions  3x10 3x10 3x10 3x10 3x10 3x10 3x10     Calf stretch   3x20" 3x20" 3x20"  3x20" with a strap 3x20"     Seated Hamstring stretch    3x30" 3x30"        Double knee to chest     3x10 3x10 3x10 5x10     Standing Right hip flexion lumbar flexion       3x10 2x10                                                                                                                                                    HEP return demonstration and progression 10'                Modalities                                                         Assessment: Tolerated treatment well  Patient would benefit from continued PT  Salvador Valero reported centralization of her symptoms to her achilles following double knee to chest being held for 1' and lowering the left leg back down to the table first  Her HEP was decreased to focus only on DKTC and neural glides  Using additional lumbar support while driving and sitting was also removed  Plan: Continue per plan of care

## 2019-01-31 ENCOUNTER — OFFICE VISIT (OUTPATIENT)
Dept: PHYSICAL THERAPY | Facility: CLINIC | Age: 54
End: 2019-01-31
Payer: COMMERCIAL

## 2019-01-31 DIAGNOSIS — M25.571 CHRONIC PAIN OF RIGHT ANKLE: Primary | ICD-10-CM

## 2019-01-31 DIAGNOSIS — G89.29 CHRONIC PAIN OF RIGHT ANKLE: Primary | ICD-10-CM

## 2019-01-31 PROCEDURE — 97140 MANUAL THERAPY 1/> REGIONS: CPT | Performed by: PHYSICAL MEDICINE & REHABILITATION

## 2019-01-31 NOTE — PROGRESS NOTES
Daily Note     Today's date: 2019  Patient name: Patricia Reyes  : 1965  MRN: [id number]  Referring provider: Tyson Bui MD  Dx:   Encounter Diagnosis     ICD-10-CM    1  Chronic pain of right ankle M25 571     G89 29                   Subjective: Ceasar Abreu reported "My pain is at a 4 today "    Objective: See treatment diary below  Precautions: none    Daily Treatment Diary     Manual  1/3/2019 2019 1/10/2019 2019 2019 2019 2019 2019 2019    IASTM 10'  10'          Overpressure REIP  8'           Ankle distraction   JR Luplucas Holly  D/Michael    STM lumbar spine    JR JR        Manual Lumbar flex        JR 1' holds JR    Gapping IV         JR    Lateral hip distraction      Wandra Brace         Exercise Diary              REIP  3x10  3x10         Self ankle DF mobs  2x10 On hold          Neural glides  3x10 3x10 3x10 3x10 3x10 3x10 3x10 3x10    TrA contractions  3x10 3x10 3x10 3x10 3x10 3x10 3x10 3x10    Calf stretch   3x20" 3x20" 3x20"  3x20" with a strap 3x20" 3x20"    Seated Hamstring stretch    3x30" 3x30"        Double knee to chest     3x10 3x10 3x10 5x10 5x10    Standing Right hip flexion lumbar flexion       3x10 2x10 D/Michael                                                                                                                                                   HEP return demonstration and progression 10'                Modalities                                                         Assessment: Tolerated treatment well  Patient would benefit from continued PT  Ceasar Abreu reported "My pain is at a 1" following her therapy session which shows progress towards her goals  Ceasarjeramie Smiths also demonstrated centralization with gapping mobilization and IASTM  Plan: Continue per plan of care

## 2019-02-05 ENCOUNTER — OFFICE VISIT (OUTPATIENT)
Dept: PHYSICAL THERAPY | Facility: CLINIC | Age: 54
End: 2019-02-05
Payer: COMMERCIAL

## 2019-02-05 DIAGNOSIS — G89.29 CHRONIC PAIN OF RIGHT ANKLE: Primary | ICD-10-CM

## 2019-02-05 DIAGNOSIS — M25.571 CHRONIC PAIN OF RIGHT ANKLE: Primary | ICD-10-CM

## 2019-02-05 PROCEDURE — 97140 MANUAL THERAPY 1/> REGIONS: CPT | Performed by: PHYSICAL MEDICINE & REHABILITATION

## 2019-02-05 NOTE — PROGRESS NOTES
PT Re-Evaluation     Today's date: 2019  Patient name: Ena Evangelista  : 1965  MRN: [id number]  Referring provider: Anibal Mcleod MD  Dx:   Encounter Diagnosis     ICD-10-CM    1  Chronic pain of right ankle M25 571     G89 29                   Assessment  Assessment details: Patient is a 48 y o  female who reports to outpatient physical therapy with right chronic ankle pain  No further referral appears necessary at this time based upon examination results  Kristofer Fleming has made progress in pain and ROM  However, she continues to have deficits including pain  Skilled physical therapy continues to be warranted to allow her to make progress towards her goals  Prognosis is good given HEP compliance and attendance to physical therapy 2x for 4 weeks  Please contact me if you have any questions or recommendations  Thank you for the referral and the opportunity to share in [facility]  Patient verbalized understanding of POC, HEP, and return demonstrated HEP  Impairments: abnormal coordination, abnormal gait, abnormal muscle firing, abnormal or restricted ROM, abnormal movement, activity intolerance, impaired balance, impaired physical strength, lacks appropriate home exercise program, pain with function and weight-bearing intolerance  Understanding of Dx/Px/POC: good   Prognosis: good    Goals  Impairment Goals  - Decrease pain by 50% within 4 weeks  - partially met, patient reports pain is now inconsistent and more often lower than previously  - Increase right flexibility by 50% within 4 weeks  - met  - Increase right lower extremity strength golbally to 4+/5 within 4 weeks  - partially met    Functional Goals  - Return to Prior Level of Function within 8 weeks  - Patient will be independent with HEP within 8 weeks      Plan  Patient would benefit from: skilled PT  Referral necessary: No  Planned modality interventions: cryotherapy, thermotherapy: hydrocollator packs and TENS  Planned therapy interventions: joint mobilization, manual therapy, neuromuscular re-education, patient education, strengthening, stretching, therapeutic activities, therapeutic exercise, home exercise program, functional ROM exercises, Bai taping, postural training, massage, balance and flexibility  Frequency: 2x week (2-3x week)  Duration in weeks: 6  Treatment plan discussed with: patient        Subjective Evaluation    History of Present Illness  Mechanism of injury: Nitish ahn reported "I can tell things are moving, it just is much slower than I would like it to be   I want to be better as quickly as possible "  Pain  Current pain ratin  At best pain ratin  At worst pain ratin    Patient Goals  Patient goal: be able to do more without having pain        Objective     Static Posture     Comments  Ankle ROM: PROM  DF: R: 6 L: 8  EV: R: 10 L: 15  IV: R: 20 L: 25  PF: R: 35 L: WNL    MMTs:  DF: R: 4+/5 L: 5/5  EV: R: 4+/5  L: 5/5  IV: R: 4/5 L: 5/5  PF: R: 4/5  L: 4/5     Clinical examination test:  Anterior drawer: -  [place] Ankle Rules: -  Windlass: - for pain and for arch creation    Palpation:  Tenderness medial malleolus    Lumbar screening due to subjective report of hx of lumbar pain:  Flexion: 0% limited - increased ankle pain  Extension 10% limited - increased ankle pain           Precautions: none    Daily Treatment Diary     Manual  1/3/2019 2019 1/10/2019 2019 2019 2019 2019 2019 2019 2019   IASTM 10'  10'          Overpressure REIP  8'           Ankle distraction   JR Heriberto Patterson  D/Michael    STM lumbar spine    JR JR        Manual Lumbar flex        JR 1' holds JR JR   Gapping IV         JR JR   Lateral hip distraction      Annitta Lei         Exercise Diary              REIP  3x10  3x10         Self ankle DF mobs  2x10 On hold          Neural glides  3x10 3x10 3x10 3x10 3x10 3x10 3x10 3x10 3x10   TrA contractions  3x10 3x10 3x10 3x10 3x10 3x10 3x10 3x10    Calf stretch   3x20" 3x20" 3x20"  3x20" with a strap 3x20" 3x20"    Seated Hamstring stretch    3x30" 3x30"        Double knee to chest     3x10 3x10 3x10 5x10 5x10 5x10   Standing Right hip flexion lumbar flexion       3x10 2x10 D/Michael                                                                                                                                                   HEP return demonstration and progression 10'                Modalities

## 2019-02-08 ENCOUNTER — OFFICE VISIT (OUTPATIENT)
Dept: PHYSICAL THERAPY | Facility: CLINIC | Age: 54
End: 2019-02-08
Payer: COMMERCIAL

## 2019-02-08 DIAGNOSIS — G89.29 CHRONIC PAIN OF RIGHT ANKLE: Primary | ICD-10-CM

## 2019-02-08 DIAGNOSIS — M25.571 CHRONIC PAIN OF RIGHT ANKLE: Primary | ICD-10-CM

## 2019-02-08 PROCEDURE — 97140 MANUAL THERAPY 1/> REGIONS: CPT | Performed by: PHYSICAL MEDICINE & REHABILITATION

## 2019-02-08 NOTE — PROGRESS NOTES
Daily Note     Today's date: 2019  Patient name: Jayashree Petersen  : 1965  MRN: [id number]  Referring provider: Linnette Rowell MD  Dx:   Encounter Diagnosis     ICD-10-CM    1  Chronic pain of right ankle M25 571     G89 29                   Subjective: Beatriz Jane reported "I think this is the best week I have had so far "      Objective: See treatment diary below  Precautions: none    Daily Treatment Diary     Manual  2019  1/10/2019 2019 2019 2019 2019 2019 2019 2019   IASTM   10'          Overpressure REIP             Ankle distraction   JR Tesha Selwyn  D/Michael    STM lumbar spine    JR JR        Manual Lumbar flex        JR 1' holds Jackolyn [place]   Gapping IV JR        JR JR   Lateral hip distraction Bernette Ryne         Exercise Diary              REIP    3x10         Self ankle DF mobs   On hold          Neural glides   3x10 3x10 3x10 3x10 3x10 3x10 3x10 3x10   TrA contractions   3x10 3x10 3x10 3x10 3x10 3x10 3x10    Calf stretch   3x20" 3x20" 3x20"  3x20" with a strap 3x20" 3x20"    Seated Hamstring stretch    3x30" 3x30"        Double knee to chest 5x10    3x10 3x10 3x10 5x10 5x10 5x10   Standing Right hip flexion lumbar flexion       3x10 2x10 D/Michael                                                                                                                                                   HEP return demonstration and progression                 Modalities                                                         Assessment: Tolerated treatment well  Patient would benefit from continued PT  Beatriz Jane demonstrated an increase in her understanding of her pathology as well as exercises which shows progress towards her goals  Following the session she reported a decrease in her symptoms  Plan: Continue per plan of care

## 2019-02-11 ENCOUNTER — OFFICE VISIT (OUTPATIENT)
Dept: PHYSICAL THERAPY | Facility: CLINIC | Age: 54
End: 2019-02-11
Payer: COMMERCIAL

## 2019-02-11 DIAGNOSIS — G89.29 CHRONIC PAIN OF RIGHT ANKLE: Primary | ICD-10-CM

## 2019-02-11 DIAGNOSIS — M25.571 CHRONIC PAIN OF RIGHT ANKLE: Primary | ICD-10-CM

## 2019-02-11 PROCEDURE — 97140 MANUAL THERAPY 1/> REGIONS: CPT | Performed by: PHYSICAL MEDICINE & REHABILITATION

## 2019-02-11 NOTE — PROGRESS NOTES
Daily Note     Today's date: 2019  Patient name: Lulú Huston  : 1965  MRN: [id number]  Referring provider: Paul Schaefer MD  Dx:   Encounter Diagnosis     ICD-10-CM    1  Chronic pain of right ankle M25 571     G89 29                 Subjective: Nella Gaucher reported "I am doing good, this is the best I have been doing in therapy, this is the best I have been doing in a few years now "    Objective: See treatment diary below  Precautions: none    Daily Treatment Diary     Manual  2019   IASTM  JR           Overpressure REIP             Ankle distraction    Inell Hanover  D/Michael    STM lumbar spine    JR JR        Manual Lumbar flex  Josephus Moat 1' holds [place]   Gapping IV Arechiga   Lateral hip distraction RQ LN    AB JL J CARLOS         Exercise Diary              REIP    3x10         Self ankle DF mobs             Neural glides    3x10 3x10 3x10 3x10 3x10 3x10 3x10   TrA contractions    3x10 3x10 3x10 3x10 3x10 3x10    Calf stretch    3x20" 3x20"  3x20" with a strap 3x20" 3x20"    Seated Hamstring stretch    3x30" 3x30"        Double knee to chest 5x10 4x10   3x10 3x10 3x10 5x10 5x10 5x10   Standing Right hip flexion lumbar flexion  JR     3x10 2x10 D/Michael    X-walks  Red 2x15ft                                                                                                                                             HEP return demonstration and progression                 Modalities                                                         Assessment: Tolerated treatment well  Patient would benefit from continued PT  Nella Gaucher was able to add x-walks to her program which shows progress towards her goals  After two sets she did report she felt some pain, so her HEP only includes 1 set of x-walks  Plan: Continue per plan of care

## 2019-02-14 ENCOUNTER — OFFICE VISIT (OUTPATIENT)
Dept: PHYSICAL THERAPY | Facility: CLINIC | Age: 54
End: 2019-02-14
Payer: COMMERCIAL

## 2019-02-14 DIAGNOSIS — G89.29 CHRONIC PAIN OF RIGHT ANKLE: Primary | ICD-10-CM

## 2019-02-14 DIAGNOSIS — M25.571 CHRONIC PAIN OF RIGHT ANKLE: Primary | ICD-10-CM

## 2019-02-14 PROCEDURE — 97140 MANUAL THERAPY 1/> REGIONS: CPT | Performed by: PHYSICAL MEDICINE & REHABILITATION

## 2019-02-14 NOTE — PROGRESS NOTES
Daily Note     Today's date: 2019  Patient name: Lulú Huston  : 1965  MRN: [id number]  Referring provider: Paul Schaefer MD  Dx:   Encounter Diagnosis     ICD-10-CM    1  Chronic pain of right ankle M25 571     G89 29        Start Time: 915  Stop Time: 935  Total time in clinic (min): 20 minutes    Subjective: Nella Gaucher reported "I am doing better, still some ups and downs but better "      Objective: See treatment diary below  Precautions: none    Daily Treatment Diary     Manual  2019   IASTM  JR JR          Overpressure REIP             Ankle distraction     Burton Manual  D/Michael    STM lumbar spine     JR        Manual Lumbar flex  Genoveva [place] 1' holds Sharla Cohen   Gapping IV [place]   Lateral hip distraction JR JR    JR JR JR         Exercise Diary              REIP             Self ankle DF mobs             Neural glides     3x10 3x10 3x10 3x10 3x10 3x10   TrA contractions     3x10 3x10 3x10 3x10 3x10    Calf stretch     3x20"  3x20" with a strap 3x20" 3x20"    Seated Hamstring stretch     3x30"        Double knee to chest 5x10 4x10   3x10 3x10 3x10 5x10 5x10 5x10   Standing Right hip flexion lumbar flexion  JR     3x10 2x10 D/Michael    X-walks  Red 2x15ft                                                                                                                                             HEP return demonstration and progression                 Modalities                                                         Assessment: Tolerated treatment well  Patient would benefit from continued PT  Extension was reintroduced to her program during today's session  During the first set she reported relief, however, during the second set she reported an increase in discomfort  So one set of extension is being utilized with flexion occurring before and after  Plan: Continue per plan of care

## 2019-02-18 ENCOUNTER — OFFICE VISIT (OUTPATIENT)
Dept: PHYSICAL THERAPY | Facility: CLINIC | Age: 54
End: 2019-02-18
Payer: COMMERCIAL

## 2019-02-18 DIAGNOSIS — G89.29 CHRONIC PAIN OF RIGHT ANKLE: Primary | ICD-10-CM

## 2019-02-18 DIAGNOSIS — M25.571 CHRONIC PAIN OF RIGHT ANKLE: Primary | ICD-10-CM

## 2019-02-18 PROCEDURE — 97140 MANUAL THERAPY 1/> REGIONS: CPT | Performed by: PHYSICAL MEDICINE & REHABILITATION

## 2019-02-18 NOTE — PROGRESS NOTES
Daily Note     Today's date: 2019  Patient name: Eloisa Moser  : 1965  MRN: [id number]  Referring provider: Donnie Welch MD  Dx:   Encounter Diagnosis     ICD-10-CM    1  Chronic pain of right ankle M25 571     G89 29                   Subjective: Cristino Mackenzie reported "In the morning I am having a hard time going down the stairs, it is like my foot doesn't want to go to the ground, but now it is more stretch than pain "    Objective: See treatment diary below  Precautions: none    Daily Treatment Diary     Manual  2019   IASTM  JR JR          Overpressure REIP             Ankle distraction      Linwood Damon  D/Michael    STM lumbar spine             Manual Lumbar flex  Lenora Anger 1' holds Shamir Fatuma IV [place]   Lateral hip distraction CK TX    JT SB ZP         Exercise Diary              REIP             Self ankle DF mobs             Neural glides      3x10 3x10 3x10 3x10 3x10   TrA contractions      3x10 3x10 3x10 3x10    Calf stretch       3x20" with a strap 3x20" 3x20"    Seated Hamstring stretch             Double knee to chest 5x10 4x10  3x10  3x10 3x10 5x10 5x10 5x10   Standing Right hip flexion lumbar flexion  JR     3x10 2x10 D/Michael    X-walks  Red 2x15ft           Heel raises    3x8         Standing calf stretch    3x15"                                                                                                                 HEP return demonstration and progression                 Modalities                                                         Assessment: Tolerated treatment well  Patient would benefit from continued PT  Cristino Junbulmaro was able to add gastroc stretching and heel raises without pain to her program during today's session  Previously, she was unable to perform these exercises without significant pain  Plan: Continue per plan of care

## 2019-02-21 ENCOUNTER — OFFICE VISIT (OUTPATIENT)
Dept: PHYSICAL THERAPY | Facility: CLINIC | Age: 54
End: 2019-02-21
Payer: COMMERCIAL

## 2019-02-21 DIAGNOSIS — G89.29 CHRONIC PAIN OF RIGHT ANKLE: Primary | ICD-10-CM

## 2019-02-21 DIAGNOSIS — M25.571 CHRONIC PAIN OF RIGHT ANKLE: Primary | ICD-10-CM

## 2019-02-21 PROCEDURE — 97140 MANUAL THERAPY 1/> REGIONS: CPT | Performed by: PHYSICAL MEDICINE & REHABILITATION

## 2019-02-21 NOTE — PROGRESS NOTES
Daily Note     Today's date: 2019  Patient name: Jr Pritchett  : 1965  MRN: [id number]  Referring provider: Vu Lowe MD  Dx:   Encounter Diagnosis     ICD-10-CM    1  Chronic pain of right ankle M25 571     G89 29                   Subjective: Selene Luis Fernando reported "I have been doing the exercises, I think I may have platueued a little bit  I haven't noticed a change over the last few days "    Objective: See treatment diary below  Precautions: none    Daily Treatment Diary     Manual  2019   IASTM  JR JR          Overpressure REIP             Ankle distraction       JR  D/Michael    STM lumbar spine             Manual Lumbar flex  Mariah Murillo 1' holds Brina Chavarria IV ST FZ ES  HB    ER KD   Lateral hip distraction Marialuisa Patino         Exercise Diary              REIP             Self ankle DF mobs             Neural glides       3x10 3x10 3x10 3x10   TrA contractions       3x10 3x10 3x10    Calf stretch       3x20" with a strap 3x20" 3x20"    Seated Hamstring stretch             Double knee to chest 5x10 4x10  3x10 3x10  3x10 5x10 5x10 5x10   Standing Right hip flexion lumbar flexion  JR     3x10 2x10 D/Michael    X-walks  Red 2x15ft   Green 2x15        Heel raises    3x8 3x8        Standing calf stretch    3x15" 3x15"                                                                                                                HEP return demonstration and progression                 Modalities                                                         Assessment: Tolerated treatment well  Patient would benefit from continued PT  Selene Luis Fernando was able to increase the resistance for her therapy program which shows progress towards her goals  Following end range flexion she reported centralization of symptoms to her achilles, however, she continues to have symptoms  Plan: Continue per plan of care    Next session reassess side lying gapping for opening along L5 only

## 2019-02-25 ENCOUNTER — OFFICE VISIT (OUTPATIENT)
Dept: PHYSICAL THERAPY | Facility: CLINIC | Age: 54
End: 2019-02-25
Payer: COMMERCIAL

## 2019-02-25 DIAGNOSIS — M25.571 CHRONIC PAIN OF RIGHT ANKLE: Primary | ICD-10-CM

## 2019-02-25 DIAGNOSIS — G89.29 CHRONIC PAIN OF RIGHT ANKLE: Primary | ICD-10-CM

## 2019-02-25 PROCEDURE — 97140 MANUAL THERAPY 1/> REGIONS: CPT | Performed by: PHYSICAL MEDICINE & REHABILITATION

## 2019-02-28 ENCOUNTER — APPOINTMENT (OUTPATIENT)
Dept: PHYSICAL THERAPY | Facility: CLINIC | Age: 54
End: 2019-02-28
Payer: COMMERCIAL

## 2019-03-14 NOTE — PROGRESS NOTES
PT Discharge    Today's date: 3/14/2019  Patient name: Lindsey Payment  : 1965  MRN: [id number]  Referring provider: Lorrie Muñoz MD  Dx:   Encounter Diagnosis     ICD-10-CM    1  Chronic pain of right ankle M25 571     G89 29      Patient denies therapy at this time      Start Time: 0800  Stop Time: 0830  Total time in clinic (min): 30 minutes    Assessment/Plan    Subjective    Objective    Flowsheet Rows      Most Recent Value   PT/OT G-Codes   Current Score  70   Projected Score  75

## 2019-03-18 DIAGNOSIS — R35.0 FREQUENCY OF URINATION: ICD-10-CM

## 2019-03-18 RX ORDER — OXYBUTYNIN CHLORIDE 15 MG/1
TABLET, EXTENDED RELEASE ORAL
Qty: 30 TABLET | Refills: 0 | Status: SHIPPED | OUTPATIENT
Start: 2019-03-18 | End: 2019-04-26 | Stop reason: SDUPTHER

## 2019-04-26 ENCOUNTER — OFFICE VISIT (OUTPATIENT)
Dept: UROLOGY | Facility: AMBULATORY SURGERY CENTER | Age: 54
End: 2019-04-26
Payer: COMMERCIAL

## 2019-04-26 VITALS
WEIGHT: 190.6 LBS | SYSTOLIC BLOOD PRESSURE: 106 MMHG | BODY MASS INDEX: 32.54 KG/M2 | DIASTOLIC BLOOD PRESSURE: 70 MMHG | HEIGHT: 64 IN | HEART RATE: 62 BPM

## 2019-04-26 DIAGNOSIS — R31.29 MICROSCOPIC HEMATURIA: ICD-10-CM

## 2019-04-26 DIAGNOSIS — N32.81 OVERACTIVE BLADDER: Primary | ICD-10-CM

## 2019-04-26 DIAGNOSIS — R35.0 FREQUENCY OF URINATION: ICD-10-CM

## 2019-04-26 LAB — POST-VOID RESIDUAL VOLUME, ML POC: 45 ML

## 2019-04-26 PROCEDURE — 99214 OFFICE O/P EST MOD 30 MIN: CPT | Performed by: NURSE PRACTITIONER

## 2019-04-26 PROCEDURE — 51798 US URINE CAPACITY MEASURE: CPT | Performed by: NURSE PRACTITIONER

## 2019-04-26 RX ORDER — VALACYCLOVIR HYDROCHLORIDE 500 MG/1
TABLET, FILM COATED ORAL
COMMUNITY
Start: 2019-03-04 | End: 2021-07-26

## 2019-04-26 RX ORDER — OXYBUTYNIN CHLORIDE 15 MG/1
15 TABLET, EXTENDED RELEASE ORAL DAILY
Qty: 90 TABLET | Refills: 3 | Status: SHIPPED | OUTPATIENT
Start: 2019-04-26 | End: 2019-09-17 | Stop reason: ALTCHOICE

## 2019-09-12 ENCOUNTER — OFFICE VISIT (OUTPATIENT)
Dept: FAMILY MEDICINE CLINIC | Facility: CLINIC | Age: 54
End: 2019-09-12
Payer: COMMERCIAL

## 2019-09-12 VITALS
TEMPERATURE: 96.6 F | HEART RATE: 70 BPM | SYSTOLIC BLOOD PRESSURE: 124 MMHG | BODY MASS INDEX: 33.46 KG/M2 | DIASTOLIC BLOOD PRESSURE: 74 MMHG | WEIGHT: 196 LBS | HEIGHT: 64 IN

## 2019-09-12 DIAGNOSIS — M79.5 FOREIGN BODY (FB) IN SOFT TISSUE: ICD-10-CM

## 2019-09-12 DIAGNOSIS — R41.3 MEMORY LOSS: Primary | ICD-10-CM

## 2019-09-12 PROCEDURE — 99214 OFFICE O/P EST MOD 30 MIN: CPT | Performed by: FAMILY MEDICINE

## 2019-09-12 PROCEDURE — 10120 INC&RMVL FB SUBQ TISS SMPL: CPT | Performed by: FAMILY MEDICINE

## 2019-09-12 PROCEDURE — 3008F BODY MASS INDEX DOCD: CPT | Performed by: FAMILY MEDICINE

## 2019-09-12 NOTE — PROGRESS NOTES
Assessment/Plan:  Will check an MRI of her brain and try to get her follow up with Neurology  Follow up here after those visits  Diagnoses and all orders for this visit:    Memory loss  -     TSH, 3rd generation with Free T4 reflex  -     Comprehensive metabolic panel  -     CBC and differential  -     Lipid panel  -     Vitamin B12  -     Vitamin D 25 hydroxy  -     MRI brain w wo contrast; Future  -     Ambulatory referral to Neurology; Future    Foreign body (FB) in soft tissue  Comments:  I&D in office  Subjective:      Patient ID: Jose Jean is a 48 y o  female  She has a few concerns today  On her right index finger she has a lump that is been bothering her for some time  She has been treating it with compound W with it is tender in does not seem to be going way  On her left temple area is is soft mass that she has noted for some time  She feels is is getting harder  It is nontender  Her biggest concern however is that she is having some memory issues  She is having trouble with said occult possible states she usually does not have trouble with  She is also having trouble with recent memories  The following portions of the patient's history were reviewed and updated as appropriate: allergies, current medications, past family history, past medical history, past social history, past surgical history and problem list     Review of Systems   Constitutional: Negative  HENT: Negative  Eyes: Negative  Respiratory: Negative  Cardiovascular: Negative  Gastrointestinal: Negative  Endocrine: Negative  Genitourinary: Negative  Musculoskeletal: Negative  Skin: Negative  Lesion as described in finger and head  Allergic/Immunologic: Negative  Neurological: Negative for dizziness, weakness and light-headedness  Hematological: Negative  Psychiatric/Behavioral: Positive for confusion  The patient is nervous/anxious      All other systems reviewed and are negative  Objective:      /74 (BP Location: Left arm)   Pulse 70   Temp (!) 96 6 °F (35 9 °C)   Ht 5' 4" (1 626 m)   Wt 88 9 kg (196 lb)   BMI 33 64 kg/m²          Physical Exam   Constitutional: She is oriented to person, place, and time  She appears well-developed and well-nourished  HENT:   Head: Normocephalic and atraumatic  Right Ear: External ear normal    Left Ear: External ear normal    Nose: Nose normal    Mouth/Throat: Oropharynx is clear and moist    Eyes: Pupils are equal, round, and reactive to light  Conjunctivae and EOM are normal    Neck: Normal range of motion  Neck supple  Cardiovascular: Normal rate, regular rhythm and normal heart sounds  Pulmonary/Chest: Effort normal and breath sounds normal    Abdominal: Soft  Bowel sounds are normal    Musculoskeletal: Normal range of motion  Neurological: She is alert and oriented to person, place, and time  She has normal reflexes  She displays normal reflexes  No cranial nerve deficit or sensory deficit  She exhibits normal muscle tone  Coordination normal    Skin: Skin is warm and dry  Does appear to be of foreign body imbedded in the right index finger  Is also a soft approximately 2 cm freely movable mass in the left temple area above her left eye  Psychiatric: She has a normal mood and affect  Her behavior is normal    Nursing note and vitals reviewed  BMI Counseling: Body mass index is 33 64 kg/m²  The BMI is above normal  Nutrition recommendations include reducing portion sizes, decreasing overall calorie intake, 3-5 servings of fruits/vegetables daily, reducing fast food intake, consuming healthier snacks, decreasing soda and/or juice intake, moderation in carbohydrate intake, increasing intake of lean protein, reducing intake of saturated fat and trans fat and reducing intake of cholesterol  Exercise recommendations include moderate aerobic physical activity for 150 minutes/week   Foreign body removal  Date/Time: 9/12/2019 4:58 PM  Performed by: Mau Peterson DO  Authorized by: Mau Peterson DO   Universal Protocol:Consent: Verbal consent obtained    Body area: skin  Removal mechanism: irrigation and alligator forceps  Dressing: antibiotic ointment  Tendon involvement: none  Depth: subcutaneous  Complexity: simple

## 2019-09-12 NOTE — PATIENT INSTRUCTIONS
Weight Management   AMBULATORY CARE:   Why it is important to manage your weight:  Being overweight increases your risk of health conditions such as heart disease, high blood pressure, type 2 diabetes, and certain types of cancer  It can also increase your risk for osteoarthritis, sleep apnea, and other respiratory problems  Aim for a slow, steady weight loss  Even a small amount of weight loss can lower your risk of health problems  How to lose weight safely:  A safe and healthy way to lose weight is to eat fewer calories and get regular exercise  You can lose up about 1 pound a week by decreasing the number of calories you eat by 500 calories each day  You can decrease calories by eating smaller portion sizes or by cutting out high-calorie foods  Read labels to find out how many calories are in the foods you eat  You can also burn calories with exercise such as walking, swimming, or biking  You will be more likely to keep weight off if you make these changes part of your lifestyle  Healthy meal plan for weight management:  A healthy meal plan includes a variety of foods, contains fewer calories, and helps you stay healthy  A healthy meal plan includes the following:  · Eat whole-grain foods more often  A healthy meal plan should contain fiber  Fiber is the part of grains, fruits, and vegetables that is not broken down by your body  Whole-grain foods are healthy and provide extra fiber in your diet  Some examples of whole-grain foods are whole-wheat breads and pastas, oatmeal, brown rice, and bulgur  · Eat a variety of vegetables every day  Include dark, leafy greens such as spinach, kale, isabel greens, and mustard greens  Eat yellow and orange vegetables such as carrots, sweet potatoes, and winter squash  · Eat a variety of fruits every day  Choose fresh or canned fruit (canned in its own juice or light syrup) instead of juice  Fruit juice has very little or no fiber  · Eat low-fat dairy foods  Drink fat-free (skim) milk or 1% milk  Eat fat-free yogurt and low-fat cottage cheese  Try low-fat cheeses such as mozzarella and other reduced-fat cheeses  · Choose meat and other protein foods that are low in fat  Choose beans or other legumes such as split peas or lentils  Choose fish, skinless poultry (chicken or turkey), or lean cuts of red meat (beef or pork)  Before you cook meat or poultry, cut off any visible fat  · Use less fat and oil  Try baking foods instead of frying them  Add less fat, such as margarine, sour cream, regular salad dressing and mayonnaise to foods  Eat fewer high-fat foods  Some examples of high-fat foods include french fries, doughnuts, ice cream, and cakes  · Eat fewer sweets  Limit foods and drinks that are high in sugar  This includes candy, cookies, regular soda, and sweetened drinks  Ways to decrease calories:   · Eat smaller portions  ¨ Use a small plate with smaller servings  ¨ Do not eat second helpings  ¨ When you eat at a restaurant, ask for a box and place half of your meal in the box before you eat  ¨ Share an entrée with someone else  · Replace high-calorie snacks with healthy, low-calorie snacks  ¨ Choose fresh fruit, vegetables, fat-free rice cakes, or air-popped popcorn instead of potato chips, nuts, or chocolate  ¨ Choose water or calorie-free drinks instead of soda or sweetened drinks  · Eat regular meals  Skipping meals can lead to overeating later in the day  Eat a healthy snack in place of a meal if you do not have time to eat a regular meal      · Do not shop for groceries when you are hungry  You may be more likely to make unhealthy food choices  Take a grocery list of healthy foods and shop after you have eaten  Exercise:  Exercise at least 30 minutes per day on most days of the week  Some examples of exercise include walking, biking, dancing, and swimming   You can also fit in more physical activity by taking the stairs instead of the elevator or parking farther away from stores  Ask your healthcare provider about the best exercise plan for you  Other things to consider as you try to lose weight:   · Be aware of situations that may give you the urge to overeat, such as eating while watching television  Find ways to avoid these situations  For example, read a book, go for a walk, or do crafts  · Meet with a weight loss support group or friends who are also trying to lose weight  This may help you stay motivated to continue working on your weight loss goals  © 2017 [facility] Information is for End User's use only and may not be sold, redistributed or otherwise used for commercial purposes  All illustrations and images included in CareNotes® are the copyrighted property of [employer] , [employer]  or [employer] [place]  The above information is an  only  It is not intended as medical advice for individual conditions or treatments  Talk to your doctor, nurse or pharmacist before following any medical regimen to see if it is safe and effective for you  Low Fat Diet   AMBULATORY CARE:   A low-fat diet  is an eating plan that is low in total fat, unhealthy fat, and cholesterol  You may need to follow a low-fat diet if you have trouble digesting or absorbing fat  You may also need to follow this diet if you have high cholesterol  You can also lower your cholesterol by increasing the amount of fiber in your diet  Soluble fiber is a type of fiber that helps to decrease cholesterol levels  Different types of fat in food:   · Limit unhealthy fats  A diet that is high in cholesterol, saturated fat, and trans fat may cause unhealthy cholesterol levels  Unhealthy cholesterol levels increase your risk of heart disease  ¨ Cholesterol:  Limit intake of cholesterol to less than 200 mg per day  Cholesterol is found in meat, eggs, and dairy      ¨ Saturated fat:  Limit saturated fat to less than 7% of your total daily calories  Ask your dietitian how many calories you need each day  Saturated fat is found in butter, cheese, ice cream, whole milk, and palm oil  Saturated fat is also found in meat, such as beef, pork, chicken skin, and processed meats  Processed meats include sausage, hot dogs, and bologna  ¨ Trans fat:  Avoid trans fat as much as possible  Trans fat is used in fried and baked foods  Foods that say trans fat free on the label may still have up to 0 5 grams of trans fat per serving  · Include healthy fats  Replace foods that are high in saturated and trans fat with foods high in healthy fats  This may help to decrease high cholesterol levels  ¨ Monounsaturated fats: These are found in avocados, nuts, and vegetable oils, such as olive, canola, and sunflower oil  ¨ Polyunsaturated fats: These can be found in vegetable oils, such as soybean or corn oil  Omega-3 fats can help to decrease the risk of heart disease  Omega-3 fats are found in fish, such as salmon, herring, trout, and tuna  Omega-3 fats can also be found in plant foods, such as walnuts, flaxseed, soybeans, and canola oil    Foods to limit or avoid:   · Grains:      ¨ Snacks that are made with partially hydrogenated oils, such as chips, regular crackers, and butter-flavored popcorn    ¨ High-fat baked goods, such as biscuits, croissants, doughnuts, pies, cookies, and pastries    · Dairy:      ¨ Whole milk, 2% milk, and yogurt and ice cream made with whole milk    ¨ Half and half creamer, heavy cream, and whipping cream    ¨ Cheese, cream cheese, and sour cream    · Meats and proteins:      ¨ High-fat cuts of meat (T-bone steak, regular hamburger, and ribs)    ¨ Fried meat, poultry (turkey and chicken), and fish    ¨ Poultry (chicken and turkey) with skin    ¨ Cold cuts (salami or bologna), hot dogs, locke, and sausage    ¨ Whole eggs and egg yolks    · Vegetables and fruits with added fat:      ¨ Fried vegetables or vegetables in butter or high-fat sauces, such as cream or cheese sauces    ¨ Fried fruit or fruit served with butter or cream    · Fats:      ¨ Butter, stick margarine, and shortening    ¨ Coconut, palm oil, and palm kernel oil  Foods to include:   · Grains:      ¨ Whole-grain breads, cereals, pasta, and brown rice    ¨ Low-fat crackers and pretzels    · Vegetables and fruits:      ¨ Fresh, frozen, or canned vegetables (no salt or low-sodium)    ¨ Fresh, frozen, dried, or canned fruit (canned in light syrup or fruit juice)    ¨ Avocado    · Low-fat dairy products:      ¨ Nonfat (skim) or 1% milk    ¨ Nonfat or low-fat cheese, yogurt, and cottage cheese    · Meats and proteins:      ¨ Chicken or turkey with no skin    ¨ Baked or broiled fish    ¨ Lean beef and pork (loin, round, extra lean hamburger)    ¨ Beans and peas, unsalted nuts, soy products    ¨ Egg whites and substitutes    ¨ Seeds and nuts    · Fats:      ¨ Unsaturated oil, such as canola, olive, peanut, soybean, or sunflower oil    ¨ Soft or liquid margarine and vegetable oil spread    ¨ Low-fat salad dressing  Other ways to decrease fat:   · Read food labels before you buy foods  Choose foods that have less than 30% of calories from fat  Choose low-fat or fat-free dairy products  Remember that fat free does not mean calorie free  These foods still contain calories, and too many calories can lead to weight gain  · Trim fat from meat and avoid fried food  Trim all visible fat from meat before you cook it  Remove the skin from poultry  Do not hearn meat, fish, or poultry  Bake, roast, boil, or broil these foods instead  Avoid fried foods  Eat a baked potato instead of [place] fries  Steam vegetables instead of sautéing them in butter  · Add less fat to foods  Use imitation locke bits on salads and baked potatoes instead of regular locke bits  Use fat-free or low-fat salad dressings instead of regular dressings   Use low-fat or nonfat butter-flavored topping instead of regular butter or margarine on popcorn and other foods  Ways to decrease fat in recipes:  Replace high-fat ingredients with low-fat or nonfat ones  This may cause baked goods to be drier than usual  You may need to use nonfat cooking spray on pans to prevent food from sticking  You also may need to change the amount of other ingredients, such as water, in the recipe  Try the following:  · Use low-fat or light margarine instead of regular margarine or shortening  · Use lean ground turkey breast or chicken, or lean ground beef (less than 5% fat) instead of hamburger  · Add 1 teaspoon of canola oil to 8 ounces of skim milk instead of using cream or half and half  · Use grated zucchini, carrots, or apples in breads instead of coconut  · Use blenderized, low-fat cottage cheese, plain tofu, or low-fat ricotta cheese instead of cream cheese  · Use 1 egg white and 1 teaspoon of canola oil, or use ¼ cup (2 ounces) of fat-free egg substitute instead of a whole egg  · Replace half of the oil that is called for in a recipe with applesauce when you bake  Use 3 tablespoons of cocoa powder and 1 tablespoon of canola oil instead of a square of baking chocolate  How to increase fiber:  Eat enough high-fiber foods to get 20 to 30 grams of fiber every day  Slowly increase your fiber intake to avoid stomach cramps, gas, and other problems  · Eat 3 ounces of whole-grain foods each day  An ounce is about 1 slice of bread  Eat whole-grain breads, such as whole-wheat bread  Whole wheat, whole-wheat flour, or other whole grains should be listed as the first ingredient on the food label  Replace white flour with whole-grain flour or use half of each in recipes  Whole-grain flour is heavier than white flour, so you may have to add more yeast or baking powder  · Eat a high-fiber cereal for breakfast   Oatmeal is a good source of soluble fiber  Look for cereals that have bran or fiber in the name   Choose whole-grain products, such as brown rice, barley, and whole-wheat pasta  · Eat more beans, peas, and lentils  For example, add beans to soups or salads  Eat at least 5 cups of fruits and vegetables each day  Eat fruits and vegetables with the peel because the peel is high in fiber  © 2017 [address] [employer]  Information is for End User's use only and may not be sold, redistributed or otherwise used for commercial purposes  All illustrations and images included in CareNotes® are the copyrighted property of [employer] , [employer]  or [employer] [place]  The above information is an  only  It is not intended as medical advice for individual conditions or treatments  Talk to your doctor, nurse or pharmacist before following any medical regimen to see if it is safe and effective for you  Heart Healthy Diet   AMBULATORY CARE:   A heart healthy diet  is an eating plan low in total fat, unhealthy fats, and sodium (salt)  A heart healthy diet helps decrease your risk for heart disease and stroke  Limit the amount of fat you eat to 25% to 35% of your total daily calories  Limit sodium to less than 2,300 mg each day  Healthy fats:  Healthy fats can help improve cholesterol levels  The risk for heart disease is decreased when cholesterol levels are normal  Choose healthy fats, such as the following:  · Unsaturated fat  is found in foods such as soybean, canola, olive, corn, and safflower oils  It is also found in soft tub margarine that is made with liquid vegetable oil  · Omega-3 fat  is found in certain fish, such as salmon, tuna, and trout, and in walnuts and flaxseed  Unhealthy fats:  Unhealthy fats can cause unhealthy cholesterol levels in your blood and increase your risk of heart disease  Limit unhealthy fats, such as the following:  · Cholesterol  is found in animal foods, such as eggs and lobster, and in dairy products made from whole milk   Limit cholesterol to less than 300 milligrams (mg) each day  You may need to limit cholesterol to 200 mg each day if you have heart disease  · Saturated fat  is found in meats, such as locke and hamburger  It is also found in chicken or turkey skin, whole milk, and butter  Limit saturated fat to less than 7% of your total daily calories  Limit saturated fat to less than 6% if you have heart disease or are at increased risk for it  · Trans fat  is found in packaged foods, such as potato chips and cookies  It is also in hard margarine, some fried foods, and shortening  Avoid trans fats as much as possible    Heart healthy foods and drinks to include:  Ask your dietitian or healthcare provider how many servings to have from each of the following food groups:  · Grains:      ¨ Whole-wheat breads, cereals, and pastas, and brown rice    ¨ Low-fat, low-sodium crackers and chips    · Vegetables:      ¨ Broccoli, green beans, green peas, and spinach    ¨ Collards, kale, and lima beans    ¨ Carrots, sweet potatoes, tomatoes, and peppers    ¨ Canned vegetables with no salt added    · Fruits:      ¨ Bananas, peaches, pears, and pineapple    ¨ Grapes, raisins, and dates    ¨ Oranges, tangerines, grapefruit, orange juice, and grapefruit juice    ¨ Apricots, mangoes, melons, and papaya    ¨ Raspberries and strawberries    ¨ Canned fruit with no added sugar    · Low-fat dairy products:      ¨ Nonfat (skim) milk, 1% milk, and low-fat almond, cashew, or soy milks fortified with calcium    ¨ Low-fat cheese, regular or frozen yogurt, and cottage cheese    · Meats and proteins , such as lean cuts of beef and pork (loin, leg, round), skinless chicken and turkey, legumes, soy products, egg whites, and nuts  Foods and drinks to limit or avoid:  Ask your dietitian or healthcare provider about these and other foods that are high in unhealthy fat, sodium, and sugar:  · Snack or packaged foods , such as frozen dinners, cookies, macaroni and cheese, and cereals with more than 300 mg of sodium per serving    · Canned or dry mixes  for cakes, soups, sauces, or gravies    · Vegetables with added sodium , such as instant potatoes, vegetables with added sauces, or regular canned vegetables    · Other foods high in sodium , such as ketchup, barbecue sauce, salad dressing, pickles, olives, soy sauce, and miso    · High-fat dairy foods  such as whole or 2% milk, cream cheese, or sour cream, and cheeses     · High-fat protein foods  such as high-fat cuts of beef (T-bone steaks, ribs), chicken or turkey with skin, and organ meats, such as liver    · Cured or smoked meats , such as hot dogs, locke, and sausage    · Unhealthy fats and oils , such as butter, stick margarine, shortening, and cooking oils such as coconut or palm oil    · Food and drinks high in sugar , such as soft drinks (soda), sports drinks, sweetened tea, candy, cake, cookies, pies, and doughnuts  Other diet guidelines to follow:   · Eat more foods containing omega-3 fats  Eat fish high in omega-3 fats at least 2 times a week  · Limit alcohol  Too much alcohol can damage your heart and raise your blood pressure  Women should limit alcohol to 1 drink a day  Men should limit alcohol to 2 drinks a day  A drink of alcohol is 12 ounces of beer, 5 ounces of wine, or 1½ ounces of liquor  · Choose low-sodium foods  High-sodium foods can lead to high blood pressure  Add little or no salt to food you prepare  Use herbs and spices in place of salt  · Eat more fiber  to help lower cholesterol levels  Eat at least 5 servings of fruits and vegetables each day  Eat 3 ounces of whole-grain foods each day  Legumes (beans) are also a good source of fiber  Lifestyle guidelines:   · Do not smoke  Nicotine and other chemicals in cigarettes and cigars can cause lung and heart damage  Ask your healthcare provider for information if you currently smoke and need help to quit  E-cigarettes or smokeless tobacco still contain nicotine  Talk to your healthcare provider before you use these products  · Exercise regularly  to help you maintain a healthy weight and improve your blood pressure and cholesterol levels  Ask your healthcare provider about the best exercise plan for you  Do not start an exercise program without asking your healthcare provider  Follow up with your healthcare provider as directed:  Write down your questions so you remember to ask them during your visits  © 2017 [facility] Information is for End User's use only and may not be sold, redistributed or otherwise used for commercial purposes  All illustrations and images included in CareNotes® are the copyrighted property of [employer]  or [employer] [place]  The above information is an  only  It is not intended as medical advice for individual conditions or treatments  Talk to your doctor, nurse or pharmacist before following any medical regimen to see if it is safe and effective for you

## 2019-09-16 ENCOUNTER — TELEPHONE (OUTPATIENT)
Dept: NEUROLOGY | Facility: CLINIC | Age: 54
End: 2019-09-16

## 2019-09-16 LAB
25(OH)D3 SERPL-MCNC: 23 NG/ML (ref 30–100)
ALBUMIN SERPL-MCNC: 4.1 G/DL (ref 3.6–5.1)
ALBUMIN/GLOB SERPL: 1.4 (CALC) (ref 1–2.5)
ALP SERPL-CCNC: 89 U/L (ref 33–130)
ALT SERPL-CCNC: 12 U/L (ref 6–29)
AST SERPL-CCNC: 11 U/L (ref 10–35)
BASOPHILS # BLD AUTO: 87 CELLS/UL (ref 0–200)
BASOPHILS NFR BLD AUTO: 1.3 %
BILIRUB SERPL-MCNC: 0.4 MG/DL (ref 0.2–1.2)
BUN SERPL-MCNC: 15 MG/DL (ref 7–25)
BUN/CREAT SERPL: ABNORMAL (CALC) (ref 6–22)
CALCIUM SERPL-MCNC: 9.1 MG/DL (ref 8.6–10.4)
CHLORIDE SERPL-SCNC: 104 MMOL/L (ref 98–110)
CHOLEST SERPL-MCNC: 248 MG/DL
CHOLEST/HDLC SERPL: 3.6 (CALC)
CO2 SERPL-SCNC: 31 MMOL/L (ref 20–32)
CREAT SERPL-MCNC: 0.83 MG/DL (ref 0.5–1.05)
EOSINOPHIL # BLD AUTO: 141 CELLS/UL (ref 15–500)
EOSINOPHIL NFR BLD AUTO: 2.1 %
ERYTHROCYTE [DISTWIDTH] IN BLOOD BY AUTOMATED COUNT: 13.6 % (ref 11–15)
GLOBULIN SER CALC-MCNC: 3 G/DL (CALC) (ref 1.9–3.7)
GLUCOSE SERPL-MCNC: 109 MG/DL (ref 65–99)
HCT VFR BLD AUTO: 43.1 % (ref 35–45)
HDLC SERPL-MCNC: 69 MG/DL
HGB BLD-MCNC: 14.4 G/DL (ref 11.7–15.5)
LDLC SERPL CALC-MCNC: 155 MG/DL (CALC)
LYMPHOCYTES # BLD AUTO: 2178 CELLS/UL (ref 850–3900)
LYMPHOCYTES NFR BLD AUTO: 32.5 %
MCH RBC QN AUTO: 29.8 PG (ref 27–33)
MCHC RBC AUTO-ENTMCNC: 33.4 G/DL (ref 32–36)
MCV RBC AUTO: 89 FL (ref 80–100)
MONOCYTES # BLD AUTO: 469 CELLS/UL (ref 200–950)
MONOCYTES NFR BLD AUTO: 7 %
NEUTROPHILS # BLD AUTO: 3826 CELLS/UL (ref 1500–7800)
NEUTROPHILS NFR BLD AUTO: 57.1 %
NONHDLC SERPL-MCNC: 179 MG/DL (CALC)
PLATELET # BLD AUTO: 279 THOUSAND/UL (ref 140–400)
PMV BLD REES-ECKER: 10.3 FL (ref 7.5–12.5)
POTASSIUM SERPL-SCNC: 4.7 MMOL/L (ref 3.5–5.3)
PROT SERPL-MCNC: 7.1 G/DL (ref 6.1–8.1)
RBC # BLD AUTO: 4.84 MILLION/UL (ref 3.8–5.1)
SL AMB EGFR AFRICAN AMERICAN: 94 ML/MIN/1.73M2
SL AMB EGFR NON AFRICAN AMERICAN: 81 ML/MIN/1.73M2
SODIUM SERPL-SCNC: 139 MMOL/L (ref 135–146)
TRIGL SERPL-MCNC: 119 MG/DL
TSH SERPL-ACNC: 2.33 MIU/L
VIT B12 SERPL-MCNC: 503 PG/ML (ref 200–1100)
WBC # BLD AUTO: 6.7 THOUSAND/UL (ref 3.8–10.8)

## 2019-09-16 NOTE — TELEPHONE ENCOUNTER
Spoke to pt for possibility of being seen today with Dr Clance Runner   Pt cant make it today will like to stay on wait list

## 2019-09-16 NOTE — PROGRESS NOTES
DEPARTMENT OF NEUROLOGICAL SCIENCES  [address] and [facility]        NEW PATIENT EVALUATION NOTE    Patient: Ken Washbrun  Medical Record Number: # [id number]  YOB: 1965  Date of visit: 9/17/2019    Referring provider: Ernesto Carpio DO    ASSESSMENT     Diagnoses for this encounter:  1  Memory loss  Ambulatory referral to Neurology    Ambulatory referral to Speech Therapy    Home Study   2  Vitamin D deficiency  Vitamin D 25 hydroxy     Impression of this 47 yo lady with reported 1 5-2 yr history of primarily word-finding difficulty, no family history of dementia, comes today for evaluation  This has been very bothersome 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  She also endorses making minor mistakes but does catch and correct herself  She denies any clear safety related concerns and no issues with home functioning otherwise  Anxiety reported controlled  She does snore at night but no previous sleep study  Her physical examination today was non-focal - a L scalp swelling appears to be superficial and mobile, unrelated to her symptoms  MOCA was 28/30 with 5/5 recall  Overall impression is mild subjective memory difficulties that may be multifactorial but no clear signs of neurodegenerative process  No parkinsonism  It may be a combination of sleep and low vitamin D  Proceed as below  PLAN     · Reviewed recent lab results from Sept 2019 as normal CMP, elevated lipid panel, normal vitamin B12, TSH and CBC  Low vitamin D     · Vitamin D level given hx of insufficiency was low  Suggest supplementation level of 2000 IU a day  · No recent or relevant neuroimaging results to review  · No need for further MRI study at this time, she can ask to cancel the MRI brain at this time  · Referral to Speech Therapy for cognitive rehabilitation therapy     · Referral for a Home Sleep Study   · She will f/u with Dermatologist upcoming for the painless scalp swelling, which is likely benign and not contributing to memory symptoms  · Encouraged to remain both physically and mentally active, including crossword puzzles, brain teasers  Online resources at [employer] for cognitive training games for free  · Thank you very much for sending me this interesting patient  · The patient has been instructed to call us about any new neurological problems or medication side effects  · Return to Clinic in 4 months    A total of 60 minutes were spent face-to-face with this patient, of which 25% was spent on counseling and coordination of care  We discussed the natural history of the patient's condition, differential diagnosis, level of diagnostic certainty, treatment alternatives and their side effects and possible complications  HISTORY OF PRESENT ILLNESS:     Ms Amber Rock is a 48 y o  right handed female with hx of urinary incontinence who has been referred to the 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  Within the past month or two she feels that she had worsened in that she took a right turn instead of a left  She denies any long-term difficulties  She says 2 5 years ago she came off birth control to see she had entered perimenopause  She says she and her  are "going through a tough time right now" but not especially higher stress level than any other time  She went to see her PCP 9/12/19 complaining of the above  Several laboratory testing initiated  She has been told she snores at night but no breath-holding spells  She denies feeling refreshed when waking up in the morning        Current Relevant Medications: Lexapro 15mg qDay, clonazepam, metoprolol, valtrex  Living Situation + ADLs:  PhD level education  Living at home with , , independent in her ADLs and IADLs  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 [employer], making some minor mistakes  She can drive a vehicle without issues, and cook  Family History: Number of First Degree Relatives With Parkinsonism/Dementia: no known hx       REVIEW OF PAST MEDICAL, SOCIAL AND FAMILY HISTORY:  This is the list of problems as per our Medical Records:    Patient Active Problem List    Diagnosis Date Noted    Memory loss 09/12/2019    Foreign body (FB) in soft tissue 09/12/2019    Chronic pain of right ankle 12/28/2018    Allergic reaction 11/26/2018    Asymptomatic PVCs 10/02/2018    Urinary incontinence in female 10/09/2017    Strain of gastrocnemius muscle of left lower extremity 07/06/2017    Obesity (BMI 30 0-34 9) 07/08/2015    Panic disorder 11/25/2014    Mitral valve disorder 09/29/2014    Sciatica 09/05/2014    Abdominal pain 01/31/2014    Abnormal involuntary movements 11/06/2013    Major depressive disorder, recurrent episode, moderate (HCC) 10/03/2013    Nontoxic uninodular goiter 05/30/2012    Hyperlipidemia 11/18/2010       Past Medical History:   Diagnosis Date    Depression     Incontinence     MVP (mitral valve prolapse)       Past Surgical History:   Procedure Laterality Date    NO PAST SURGERIES        No Known Allergies     Outpatient Encounter Medications as of 9/17/2019   Medication Sig Dispense Refill    clonazePAM (KlonoPIN) 0 5 mg tablet Take 1 tablet (0 5 mg total) by mouth 2 (two) times a day (Patient taking differently: Take 0 5 mg by mouth 2 (two) times a day as needed ) 60 tablet 0    escitalopram (LEXAPRO) 10 mg tablet Take 1 tablet (10 mg total) by mouth daily 90 tablet 1    escitalopram (LEXAPRO) 5 mg tablet Take 1 tablet (5 mg total) by mouth daily for 90 days 90 tablet 1    metoprolol succinate (TOPROL-XL) 25 mg 24 hr tablet Take 12 5 mg by mouth       valACYclovir (VALTREX) 500 mg tablet       [DISCONTINUED] fluticasone (CUTIVATE) 0 05 % cream Apply topically 2 (two) times a day (Patient not taking: Reported on 11/26/2018 ) 30 g 0    [DISCONTINUED] oxybutynin (DITROPAN XL) 15 MG 24 hr tablet Take 1 tablet (15 mg total) by mouth daily (Patient not taking: Reported on 9/17/2019) 90 tablet 3     No facility-administered encounter medications on file as of 9/17/2019  Social History     Tobacco Use    Smoking status: Never Smoker    Smokeless tobacco: Never Used   Substance Use Topics    Alcohol use: No        Family History   Problem Relation Age of Onset    No Known Problems Father     Hyperlipidemia Mother     Cancer Paternal Grandmother         Bladder CA    Angina Paternal Grandfather         REVIEW OF SYSTEMS:  The patient has entered data on an intake form regarding present illness, past medical and surgical history, medications, allergies, family and social history, and a full review of 14 systems  I have reviewed this form with the patient, and all the relevant information has been included on this note  The full review of systems was negative except as stated in HPI and below  Constitutional: Negative  Negative for appetite change and fever  HENT: Negative  Negative for hearing loss, tinnitus, trouble swallowing and voice change  Eyes: Negative  Negative for photophobia and pain  Respiratory: Negative  Negative for shortness of breath  Cardiovascular: Negative  Negative for palpitations  Gastrointestinal: Negative  Negative for nausea and vomiting  Endocrine: Negative  Negative for cold intolerance and heat intolerance  Genitourinary: Negative  Negative for dysuria, frequency and urgency  Musculoskeletal: Negative  Negative for myalgias and neck pain  Skin: Negative  Negative for rash     Allergic/Immunologic: Negative  Neurological: Negative for dizziness, tremors, seizures, syncope, facial asymmetry, speech difficulty, weakness, light-headedness, numbness and headaches  Positive for memory problems  Positive for forgetfulness  Hematological: Negative  Does not bruise/bleed easily  Psychiatric/Behavioral: Negative  Negative for confusion, hallucinations and sleep disturbance  PHYSICAL EXAMINATION:     Vital signs:  /78 (BP Location: Left arm, Patient Position: Sitting, Cuff Size: Large)   Pulse 75   Ht 5' 4" (1 626 m)   Wt 89 2 kg (196 lb 11 2 oz)   BMI 33 76 kg/m²     General:  Well-appearing, well nourished, pleasant patient in no acute distress  Mood and Fund of Knowledge are appropriate  Head:  Normocephalic, atraumatic  She does have a mild swelling of the L temporal region focally, that appears to be subcutaneous  No overlying skin lesion, no discoloration  Oropharynx and conjunctiva are clear  Speech  No hypophonia, no bradylalia  No scanning speech  Language: Comprehension intact  Neck:  Supple, strong 5/5 forward flexion and retroflexion  Extremities: Range of motion is normal       Cognitive and Mental Exam:  MOCA version 1 0    Points MAX   Visuospatial  ----------   Trails A 1 1   Cube Drawing 1 1   Clock Drawing 3 3   Naming Objects 3 3   Attention  ----------   Digit Span 1 2   Letter Reading 1 1   Serial 7s 3 3   Language  ----------   Repetition 2 2   Fluency 1 1   Abstraction 1 2   Delayed Recall 5 5   Orientation               6               6              28 30   +0 for PhD level =  28    She was able to list out the months of year backwards order correctly and without hesitation  She was able to correctly list out the [address] [facility] presidents in backwards order from [place] to [place]  Apraxia: none  Frontal Release Signs: none    Cranial Nerves:  CN II:  Direct and consensual light reflexes were equally reactive to light symmetrically    No afferent pupillary defect   Visual fields are full to confrontation  CN III / IV / VI: Extraocular movements were full, with normal pursuit and saccades  CN V:   Facial sensation to light touch was intact  CN VII: Face is symmetric with normal strength  CN VIII: Hearing was assessed using the Calibrated Finger Rub Auditory Screening Test (CALFRAST) and was not abnormal (Better than CALFRAST-Strong-70)  CN X:   Palate is up going bilaterally and symmetrically  CN XI:  Neck muscles are strong  CN XII: Tongue protrusion is at midline with normal movements  No dysarthria  Motor:    Dystonia: none  Dyskinesia: none  Myoclonus: none  Chorea: none  Tics: none       [id number]                Time since last dose:   9/17/19       Speech  0     Facial Expression  0    Postural Tremor (Right) 0    Postural Tremor (Left) 0    Kinetic Tremor (Right)  0    Kinetic Tremor (Left)  0    Rest tremor amplitude RUE 0    Rest tremor amplitude LUE 0    Rest tremor amplitude RLE 0    Rest tremor amplitude LLE 0    Lip/Jaw Tremor  0    Consistency of tremor 0    Finger Taps (Right)   0    Finger Taps (Left)  0    Hand Movement (Right)  0    Hand Movement (Left)   0    Pronation/Supination (Right)  0    Pronation/Supination (Left)   0    Toe Tapping (Right) 0    Toe Tapping (Left) 0    Leg Agility (Right)  0    Leg Agility (Left)   0     Rigidity - Neck  0     Rigidity - Upper Extremity (Right)  0      Rigidity - Upper Extremity (Left)   0     Rigidity - Lower Extremity (Right)  0    Rigidity - Lower Extremity (Left)   0    Arising from Chair   0      Gait   0     Freezing of Gait 0     Postural Stability  -     Posture 0     Global spontaneity of movement 0       -------------------------------------------------------------------------------------    Muscle Strength Right Left  Muscle Strength Right Left   Deltoid 5/5 5/5  Hip Adductors 5/5 5/5   Biceps 5/5 5/5  Hip Abductors 5/5 5/5   Triceps 5/5 5/5  Knee Extensors 5/5 5/5   Wrist Extensors 5/5 5/5  Knee Flexors 5/5 5/5   Wrist Flexors 5/5 5/5  Ankle Extensors 5/5 5/5    5/5 5/5  Ankle Flexors 5/5 5/5   Finger Abductors 5/5 5/5       Hip Flexors 5/5 5/5   Hip Extensors 5/5 5/5     Sensory  Intact to Light Touch, Temperature, and vibration sense in all extremities  Coordination:  Finger-to-nose-finger: normal     Gait:  Normal comprehensive gait evaluation, has normal raising, stance, gait, turns        Reflexes:    Right Left   Biceps 2/4 2/4   Brachioradialis 2/4 2/4   Triceps 2/4 2/4   Knee 2/4 2/4   Ankle 2/4 2/4      Plantar cutaneous reflex:  Right: flexor  Left: flexor

## 2019-09-17 ENCOUNTER — CONSULT (OUTPATIENT)
Dept: NEUROLOGY | Facility: CLINIC | Age: 54
End: 2019-09-17
Payer: COMMERCIAL

## 2019-09-17 VITALS
SYSTOLIC BLOOD PRESSURE: 131 MMHG | DIASTOLIC BLOOD PRESSURE: 78 MMHG | WEIGHT: 196.7 LBS | HEART RATE: 75 BPM | BODY MASS INDEX: 33.58 KG/M2 | HEIGHT: 64 IN

## 2019-09-17 DIAGNOSIS — R41.3 MEMORY LOSS: Primary | ICD-10-CM

## 2019-09-17 DIAGNOSIS — E55.9 VITAMIN D DEFICIENCY: ICD-10-CM

## 2019-09-17 PROCEDURE — 99244 OFF/OP CNSLTJ NEW/EST MOD 40: CPT | Performed by: PSYCHIATRY & NEUROLOGY

## 2019-09-17 NOTE — PROGRESS NOTES
Review of Systems   Constitutional: Negative  Negative for appetite change and fever  HENT: Negative  Negative for hearing loss, tinnitus, trouble swallowing and voice change  Eyes: Negative  Negative for photophobia and pain  Respiratory: Negative  Negative for shortness of breath  Cardiovascular: Negative  Negative for palpitations  Gastrointestinal: Negative  Negative for nausea and vomiting  Endocrine: Negative  Negative for cold intolerance and heat intolerance  Genitourinary: Negative  Negative for dysuria, frequency and urgency  Musculoskeletal: Negative  Negative for myalgias and neck pain  Skin: Negative  Negative for rash  Allergic/Immunologic: Negative  Neurological: Negative for dizziness, tremors, seizures, syncope, facial asymmetry, speech difficulty, weakness, light-headedness, numbness and headaches  Positive for memory problems  Positive for forgetfulness     Hematological: Negative  Does not bruise/bleed easily  Psychiatric/Behavioral: Negative  Negative for confusion, hallucinations and sleep disturbance

## 2019-09-17 NOTE — LETTER
September 17, 2019     Kyleigh Grider, [address] [address]    Patient: Amanda Kebede   YOB: 1965   Date of Visit: 9/17/2019       Dear Dr Pretty Gay: Thank you for referring Amanda Kebede to me for evaluation  Below are my notes for this consultation  If you have questions, please do not hesitate to call me  I look forward to following your patient along with you  Sincerely,        Robina Moore MD        CC: No Recipients  Robina Moore MD  9/17/2019  9:07 PM  Sign at close encounter  [address] [facility]        NEW PATIENT EVALUATION NOTE    Patient: Amanda Kebede  Medical Record Number: # [id number]  YOB: 1965  Date of visit: 9/17/2019    Referring provider: Deuce Corbin DO    ASSESSMENT     Diagnoses for this encounter:  1  Memory loss  Ambulatory referral to Neurology    Ambulatory referral to Speech Therapy    Home Study   2  Vitamin D deficiency  Vitamin D 25 hydroxy     Impression of this 47 yo lady with reported 1 5-2 yr history of primarily word-finding difficulty, no family history of dementia, comes today for evaluation  This has been very bothersome 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  She also endorses making minor mistakes but does catch and correct herself  She denies any clear safety related concerns and no issues with home functioning otherwise  Anxiety reported controlled  She does snore at night but no previous sleep study  Her physical examination today was non-focal - a L scalp swelling appears to be superficial and mobile, unrelated to her symptoms  MOCA was 28/30 with 5/5 recall  Overall impression is mild subjective memory difficulties that may be multifactorial but no clear signs of neurodegenerative process  No parkinsonism  It may be a combination of sleep and low vitamin D  Proceed as below       PLAN     · Reviewed recent lab results from Sept 2019 as normal CMP, elevated lipid panel, normal vitamin B12, TSH and CBC  Low vitamin D     · Vitamin D level given hx of insufficiency was low  Suggest supplementation level of 2000 IU a day  · No recent or relevant neuroimaging results to review  · No need for further MRI study at this time, she can ask to cancel the MRI brain at this time  · Referral to Speech Therapy for cognitive rehabilitation therapy  · Referral for a Home Sleep Study   · She will f/u with Dermatologist upcoming for the painless scalp swelling, which is likely benign and not contributing to memory symptoms  · Encouraged to remain both physically and mentally active, including crossword puzzles, brain teasers  Online resources at [employer] for cognitive training games for free  · Thank you very much for sending me this interesting patient  · The patient has been instructed to call us about any new neurological problems or medication side effects  · Return to Clinic in 4 months    A total of 60 minutes were spent face-to-face with this patient, of which 25% was spent on counseling and coordination of care  We discussed the natural history of the patient's condition, differential diagnosis, level of diagnostic certainty, treatment alternatives and their side effects and possible complications  HISTORY OF PRESENT ILLNESS:     Ms Leonidas Torres is a 48 y o  right handed female with hx of urinary incontinence who has been referred to the [facility] [address] [facility] for evaluation of memory loss  The patient was not accompanied today  History was obtained from patient  Referred from PCP    Main bothersome neurological symptoms today are:   1  Memory issues  She tells me starting 1 5 years ago she had more trouble with word-finding difficulty primarily  No instigating factors known  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   Within the past month or two she feels that she had worsened in that she took a right turn instead of a left  She denies any long-term difficulties  She says 2 5 years ago she came off birth control to see she had entered perimenopause  She says she and her  are "going through a tough time right now" but not especially higher stress level than any other time  She went to see her PCP 9/12/19 complaining of the above  Several laboratory testing initiated  She has been told she snores at night but no breath-holding spells  She denies feeling refreshed when waking up in the morning  Current Relevant Medications: Lexapro 15mg qDay, clonazepam, metoprolol, valtrex  Living Situation + ADLs:  PhD level education  Living at home with , , independent in her ADLs and IADLs  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, making some minor mistakes  She can drive a vehicle without issues, and cook  Family History: Number of First Degree Relatives With Parkinsonism/Dementia: no known hx       REVIEW OF PAST MEDICAL, SOCIAL AND FAMILY HISTORY:  This is the list of problems as per our Medical Records:    Patient Active Problem List    Diagnosis Date Noted    Memory loss 09/12/2019    Foreign body (FB) in soft tissue 09/12/2019    Chronic pain of right ankle 12/28/2018    Allergic reaction 11/26/2018    Asymptomatic PVCs 10/02/2018    Urinary incontinence in female 10/09/2017    Strain of gastrocnemius muscle of left lower extremity 07/06/2017    Obesity (BMI 30 0-34 9) 07/08/2015    Panic disorder 11/25/2014    Mitral valve disorder 09/29/2014    Sciatica 09/05/2014    Abdominal pain 01/31/2014    Abnormal involuntary movements 11/06/2013    Major depressive disorder, recurrent episode, moderate ([facility] [address] ) 10/03/2013    Nontoxic uninodular goiter 05/30/2012    Hyperlipidemia 11/18/2010       Past Medical History:   Diagnosis Date    Depression     Incontinence     MVP (mitral valve prolapse)       Past Surgical History: Procedure Laterality Date    NO PAST SURGERIES        No Known Allergies     Outpatient Encounter Medications as of 9/17/2019   Medication Sig Dispense Refill    clonazePAM (KlonoPIN) 0 5 mg tablet Take 1 tablet (0 5 mg total) by mouth 2 (two) times a day (Patient taking differently: Take 0 5 mg by mouth 2 (two) times a day as needed ) 60 tablet 0    escitalopram (LEXAPRO) 10 mg tablet Take 1 tablet (10 mg total) by mouth daily 90 tablet 1    escitalopram (LEXAPRO) 5 mg tablet Take 1 tablet (5 mg total) by mouth daily for 90 days 90 tablet 1    metoprolol succinate (TOPROL-XL) 25 mg 24 hr tablet Take 12 5 mg by mouth       valACYclovir (VALTREX) 500 mg tablet       [DISCONTINUED] fluticasone (CUTIVATE) 0 05 % cream Apply topically 2 (two) times a day (Patient not taking: Reported on 11/26/2018 ) 30 g 0    [DISCONTINUED] oxybutynin (DITROPAN XL) 15 MG 24 hr tablet Take 1 tablet (15 mg total) by mouth daily (Patient not taking: Reported on 9/17/2019) 90 tablet 3     No facility-administered encounter medications on file as of 9/17/2019  Social History     Tobacco Use    Smoking status: Never Smoker    Smokeless tobacco: Never Used   Substance Use Topics    Alcohol use: No        Family History   Problem Relation Age of Onset    No Known Problems Father     Hyperlipidemia Mother     Cancer Paternal Grandmother         Bladder CA    Angina Paternal Grandfather         REVIEW OF SYSTEMS:  The patient has entered data on an intake form regarding present illness, past medical and surgical history, medications, allergies, family and social history, and a full review of 14 systems  I have reviewed this form with the patient, and all the relevant information has been included on this note  The full review of systems was negative except as stated in HPI and below  Constitutional: Negative  Negative for appetite change and fever  HENT: Negative    Negative for hearing loss, tinnitus, trouble swallowing and voice change  Eyes: Negative  Negative for photophobia and pain  Respiratory: Negative  Negative for shortness of breath  Cardiovascular: Negative  Negative for palpitations  Gastrointestinal: Negative  Negative for nausea and vomiting  Endocrine: Negative  Negative for cold intolerance and heat intolerance  Genitourinary: Negative  Negative for dysuria, frequency and urgency  Musculoskeletal: Negative  Negative for myalgias and neck pain  Skin: Negative  Negative for rash  Allergic/Immunologic: Negative  Neurological: Negative for dizziness, tremors, seizures, syncope, facial asymmetry, speech difficulty, weakness, light-headedness, numbness and headaches  Positive for memory problems  Positive for forgetfulness  Hematological: Negative  Does not bruise/bleed easily  Psychiatric/Behavioral: Negative  Negative for confusion, hallucinations and sleep disturbance  PHYSICAL EXAMINATION:     Vital signs:  /78 (BP Location: Left arm, Patient Position: Sitting, Cuff Size: Large)   Pulse 75   Ht 5' 4" (1 626 m)   Wt 89 2 kg (196 lb 11 2 oz)   BMI 33 76 kg/m²      General:  Well-appearing, well nourished, pleasant patient in no acute distress  Mood and Fund of Knowledge are appropriate  Head:  Normocephalic, atraumatic  She does have a mild swelling of the L temporal region focally, that appears to be subcutaneous  No overlying skin lesion, no discoloration  Oropharynx and conjunctiva are clear  Speech  No hypophonia, no bradylalia  No scanning speech  Language: Comprehension intact  Neck:  Supple, strong 5/5 forward flexion and retroflexion     Extremities: Range of motion is normal       Cognitive and Mental Exam:  MOCA version 1 0    Points MAX   Visuospatial  ----------   Trails A 1 1   Cube Drawing 1 1   Clock Drawing 3 3   Naming Objects 3 3   Attention  ----------   Digit Span 1 2   Letter Reading 1 1   Serial 7s 3 3   Language  ---------- Repetition 2 2   Fluency 1 1   Abstraction 1 2   Delayed Recall 5 5   Orientation               6               6              28 30   +0 for PhD level =  28    She was able to list out the months of year backwards order correctly and without hesitation  She was able to correctly list out the [address] presidents in backwards order from [place] to [place]  Apraxia: none  Frontal Release Signs: none    Cranial Nerves:  CN II:  Direct and consensual light reflexes were equally reactive to light symmetrically  No afferent pupillary defect   Visual fields are full to confrontation  CN III / IV / VI: Extraocular movements were full, with normal pursuit and saccades  CN V:   Facial sensation to light touch was intact  CN VII: Face is symmetric with normal strength  CN VIII: Hearing was assessed using the Calibrated Finger Rub Auditory Screening Test (CALFRAST) and was not abnormal (Better than CALFRAST-Strong-70)  CN X:   Palate is up going bilaterally and symmetrically  CN XI:  Neck muscles are strong  CN XII: Tongue protrusion is at midline with normal movements  No dysarthria  Motor:    Dystonia: none  Dyskinesia: none  Myoclonus: none  Chorea: none  Tics: none       UPDRSIII                Time since last dose:   9/17/19       Speech  0     Facial Expression  0    Postural Tremor (Right) 0    Postural Tremor (Left) 0    Kinetic Tremor (Right)  0    Kinetic Tremor (Left)  0    Rest tremor amplitude RUE 0    Rest tremor amplitude LUE 0    Rest tremor amplitude RLE 0    Rest tremor amplitude LLE 0    Lip/Jaw Tremor  0    Consistency of tremor 0    Finger Taps (Right)   0    Finger Taps (Left)  0    Hand Movement (Right)  0    Hand Movement (Left)   0    Pronation/Supination (Right)  0    Pronation/Supination (Left)   0    Toe Tapping (Right) 0    Toe Tapping (Left) 0    Leg Agility (Right)  0    Leg Agility (Left)   0     Rigidity - Neck  0     Rigidity - Upper Extremity (Right)  0      Rigidity - Upper Extremity (Left)   0     Rigidity - Lower Extremity (Right)  0    Rigidity - Lower Extremity (Left)   0    Arising from Chair   0      Gait    0     Freezing of Gait 0     Postural Stability  -     Posture 0     Global spontaneity of movement 0       -------------------------------------------------------------------------------------    Muscle Strength Right Left  Muscle Strength Right Left   Deltoid 5/5 5/5  Hip Adductors 5/5 5/5   Biceps 5/5 5/5  Hip Abductors 5/5 5/5   Triceps 5/5 5/5  Knee Extensors 5/5 5/5   Wrist Extensors 5/5 5/5  Knee Flexors 5/5 5/5   Wrist Flexors 5/5 5/5  Ankle Extensors 5/5 5/5    5/5 5/5  Ankle Flexors 5/5 5/5   Finger Abductors 5/5 5/5       Hip Flexors 5/5 5/5   Hip Extensors 5/5 5/5     Sensory  Intact to Light Touch, Temperature, and vibration sense in all extremities  Coordination:  Finger-to-nose-finger: normal     Gait:  Normal comprehensive gait evaluation, has normal raising, stance, gait, turns        Reflexes:    Right Left   Biceps 2/4 2/4   Brachioradialis 2/4 2/4   Triceps 2/4 2/4   Knee 2/4 2/4   Ankle 2/4 2/4      Plantar cutaneous reflex:  Right: flexor  Left: flexor

## 2019-09-17 NOTE — PATIENT INSTRUCTIONS
· Reviewed recent lab results from Sept 2019 as normal CMP, elevated lipid panel, normal vitamin B12, TSH and CBC  Low vitamin D     · Vitamin D level given hx of insufficiency  If low then will suggest supplementation level as appropriate  · No recent or relevant neuroimaging results to review  · No need for further MRI study at this time, she can ask to cancel the MRI brain at this time  · Referral to Speech Therapy for cognitive rehabilitation therapy  · Referral for a Sleep Study  · She will f/u with Dermatologist upcoming for the painless scalp swelling, which is likely benign and not contributing to memory symptoms  · Encouraged to remain both physically and mentally active, including crossword puzzles, brain teasers  Online resources at [employer] for cognitive training games for free  · Thank you very much for sending me this interesting patient  · The patient has been instructed to call us about any new neurological problems or medication side effects    · Return to Clinic in 4 months

## 2019-09-20 ENCOUNTER — TELEPHONE (OUTPATIENT)
Dept: SLEEP CENTER | Facility: CLINIC | Age: 54
End: 2019-09-20

## 2019-09-20 NOTE — TELEPHONE ENCOUNTER
----- Message from Lizzy Owen MD sent at 9/19/2019  4:16 PM EDT -----  Approved  ----- Message -----  From: Crystal Pham  Sent: 9/18/2019   3:09 PM EDT  To: [id number], [address]    This sleep study needs approval      If approved please sign and return to clerical pool  If denied please include reasons why  Also provide alternative testing if warranted  Please sign and return to clerical pool

## 2019-09-23 DIAGNOSIS — F33.1 MAJOR DEPRESSIVE DISORDER, RECURRENT EPISODE, MODERATE (HCC): ICD-10-CM

## 2019-09-24 RX ORDER — ESCITALOPRAM OXALATE 10 MG/1
TABLET ORAL
Qty: 90 TABLET | Refills: 0 | Status: SHIPPED | OUTPATIENT
Start: 2019-09-24 | End: 2019-12-18 | Stop reason: SDUPTHER

## 2019-09-24 RX ORDER — ESCITALOPRAM OXALATE 5 MG/1
TABLET ORAL
Qty: 90 TABLET | Refills: 0 | Status: SHIPPED | OUTPATIENT
Start: 2019-09-24 | End: 2019-12-18 | Stop reason: SDUPTHER

## 2019-09-25 ENCOUNTER — TELEPHONE (OUTPATIENT)
Dept: SPEECH THERAPY | Facility: CLINIC | Age: 54
End: 2019-09-25

## 2019-10-08 ENCOUNTER — TELEPHONE (OUTPATIENT)
Dept: NEUROLOGY | Facility: CLINIC | Age: 54
End: 2019-10-08

## 2019-10-16 ENCOUNTER — EVALUATION (OUTPATIENT)
Dept: SPEECH THERAPY | Facility: CLINIC | Age: 54
End: 2019-10-16
Payer: COMMERCIAL

## 2019-10-16 DIAGNOSIS — R48.8 OTHER SYMBOLIC DYSFUNCTIONS: Primary | ICD-10-CM

## 2019-10-16 PROCEDURE — 96125 COGNITIVE TEST BY HC PRO: CPT | Performed by: SPEECH-LANGUAGE PATHOLOGIST

## 2019-10-16 NOTE — PROGRESS NOTES
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  She was seen by her neurologist in September  Dr Jonathan Mcelroy report reads, in part, as follows: "Overall impression is mild subjective memory difficulties that may be multifactorial but no clear signs of neurodegenerative process  No parkinsonism  It may be a combination of sleep and low vitamin D  " She would like to improve her word finding and memory  She notes some improvement in both areas since she discontinued two medications she felt were contributing her memory and word finding issues     Safety Measures: N/A    Reason for Referral:Change in cognitive status  Prior Functional Status:Communication effective and appropriate in all situations  Medical History significant for:   Past Medical History:   Diagnosis Date    Depression     Incontinence     MVP (mitral valve prolapse)      Clinically Complex Situations:N/A    Hearing:Not Tested  Vision:WNL  Medication List:   Current Outpatient Medications   Medication Sig Dispense Refill    clonazePAM (KlonoPIN) 0 5 mg tablet Take 1 tablet (0 5 mg total) by mouth 2 (two) times a day (Patient taking differently: Take 0 5 mg by mouth 2 (two) times a day as needed ) 60 tablet 0    escitalopram (LEXAPRO) 10 mg tablet TAKE ONE TABLET BY MOUTH DAILY 90 tablet 0    escitalopram (LEXAPRO) 5 mg tablet TAKE ONE TABLET BY MOUTH DAILY 90 tablet 0    metoprolol succinate (TOPROL-XL) 25 mg 24 hr tablet Take 12 5 mg by mouth       valACYclovir (VALTREX) 500 mg tablet        No current facility-administered medications for this visit  Allergies: No Known Allergies  Primary Language: English  Preferred Language: English     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, mostly fiction  Current Education status: Not in school  Highest Level of Education: PhD  Current / Leopold Orion being received: None  Mental Status: Alert  Behavior Status:Cooperative  Communication Modalities: Verbal  Recent Speech/ Language therapy:None  Rehabilitation Prognosis:None    Assessments:Cognitive Assessment  Highest Level of Education:Master's degree or higher  Concussion  (patient identified the following areas as areas of difficulty)  Memory Remembering people's names and Communication Word finding in conversation and Expressing thoughts and ideas into writing      Standardized testing: The Repeatable Battery for the Assessment of Neuropsychological Status (RBANS) is a brief, individually-administered assessment which measures attention, language, visuospatial/constructional abilities, and immediate & delayed memory  The RBANS is intended for use with adolescents to adults, ages 15 to 80 years  The following results were obtained during the administration of the assessment  Form: A    Cognitive Domain/Subtest: Index Score: Percentile Rank: Classification:   IMMEDIATE MEMORY 114 82%ile High Average        1  List Learning (13/52)        2  Story Memory (17/24)       VISUOSPATIAL/  CONSTRUCTIONAL 121 92%ile Superior        3  Figure Copy (20/20)        4  Line Orientation (19/20)       LANGUAGE 109 73%ile Average        5  Picture Naming (10/10)        6  Semantic Fluency (27/40)       ATTENTION 112 79%ile High Average        7  Digit Span (10/16)        8  Coding (57/89)       DELAYED MEMORY 124 95%ile Superior        9  List Recall (10/10)        10  List Recognition (20/20)        11   Story Recall (11/12) 12  Figure Recall (18/20)         Sum of Index Scores:  580   Total Score:  123   Percentile: 94%ile   Classification: Superior       Additional Testing   Dominic & Dominic  60/60 (100% accuracy)         Goals  Short Term Goals:N/A - Evaluation Only   Long Term Goals: N/A - Evaluation Only         Impressions/ Recommendations  Impressions:  Language based cognitive skills appear well within normal limits  Confrontational naming, as measured on the Dominic & Dominic, also appears within normal limits  While all scores were  within normal limits, she had comparative difficulty with digit span activities, which is a measure of attention  She might benefit from attention dependent "brain games" and or focused mediation exercises on mindfulness and attention  If symptoms persist, a neuropsychological exam may be helpful  Recommendations:   Patients would benefit from: Other  Consider neuropschological exam if symptoms persist     Frequency:No treatment warranted at this time   Duration:Other  N/A    Intervention certification JYRL:70/31/9177  Intervention certification to: 35/30/4073  Intervention Comments: Nitish ahn participated well with the evaluation process

## 2019-10-30 ENCOUNTER — HOSPITAL ENCOUNTER (OUTPATIENT)
Dept: SLEEP CENTER | Facility: CLINIC | Age: 54
Discharge: HOME | End: 2019-10-30
Payer: COMMERCIAL

## 2019-10-30 DIAGNOSIS — R41.3 MEMORY LOSS: ICD-10-CM

## 2019-10-30 PROCEDURE — G0399 HOME SLEEP TEST/TYPE 3 PORTA: HCPCS

## 2019-10-30 PROCEDURE — G0399 HOME SLEEP TEST/TYPE 3 PORTA: HCPCS | Performed by: PSYCHIATRY & NEUROLOGY

## 2019-10-31 NOTE — PROGRESS NOTES
Home Sleep Study Documentation    Pre-Sleep Home Study:    Set-up and instructions performed by: JACOB Anderson     Technician performed demonstration for Patient: yes    Return demonstration performed by Patient: yes    Written instructions provided to Patient: yes    Patient signed consent form: yes        Post-Sleep Home Study:    Additional comments by Patient: None    Home Sleep Study Failed:no:    Failure reason: N/A    Reported or Detected: N/A    Scored by: DONALD   [facility]

## 2019-11-01 DIAGNOSIS — G47.33 OSA (OBSTRUCTIVE SLEEP APNEA): Primary | ICD-10-CM

## 2019-11-01 NOTE — RESULT ENCOUNTER NOTE
Pt does not have MyChart  I reviewed her home sleep study results and they were supportive of at least a moderate obstructive sleep apnea  This is likely one of the main contributors to her memory loss  The solution would be starting her on CPAP  Since I do not usually manage CPAP settings, I will make a formal consultation with Sleep Medicine for her to get the process started  Order is in  Continue with cognitive rehabilitation  Thank you

## 2019-11-04 ENCOUNTER — TELEPHONE (OUTPATIENT)
Dept: NEUROLOGY | Facility: CLINIC | Age: 54
End: 2019-11-04

## 2019-11-04 NOTE — TELEPHONE ENCOUNTER
----- Message from Solange Gilbert MD sent at 11/1/2019  6:39 PM EDT -----  Pt does not have MyChart  I reviewed her home sleep study results and they were supportive of at least a moderate obstructive sleep apnea  This is likely one of the main contributors to her memory loss  The solution would be starting her on CPAP  Since I do not usually manage CPAP settings, I will make a formal consultation with Sleep Medicine for her to get the process started  Order is in  Continue with cognitive rehabilitation  Thank you

## 2019-11-05 ENCOUNTER — TELEPHONE (OUTPATIENT)
Dept: SLEEP CENTER | Facility: CLINIC | Age: 54
End: 2019-11-05

## 2019-11-05 NOTE — TELEPHONE ENCOUNTER
----- Message from Asya Vergara MD sent at 11/1/2019 12:06 PM EDT -----  HST read  CPAP titration recommended due to borderline oxygen levels seen at times

## 2019-11-05 NOTE — TELEPHONE ENCOUNTER
Pt called back in  Relayed message from Dr Fredy Hogan  Verbalized understanding  Given sleep medicine phone number

## 2019-12-18 DIAGNOSIS — F33.1 MAJOR DEPRESSIVE DISORDER, RECURRENT EPISODE, MODERATE (HCC): ICD-10-CM

## 2019-12-18 RX ORDER — ESCITALOPRAM OXALATE 10 MG/1
TABLET ORAL
Qty: 90 TABLET | Refills: 0 | Status: SHIPPED | OUTPATIENT
Start: 2019-12-18 | End: 2020-03-02

## 2019-12-18 RX ORDER — ESCITALOPRAM OXALATE 5 MG/1
TABLET ORAL
Qty: 90 TABLET | Refills: 0 | Status: SHIPPED | OUTPATIENT
Start: 2019-12-18 | End: 2020-03-02

## 2019-12-23 ENCOUNTER — OFFICE VISIT (OUTPATIENT)
Dept: SLEEP CENTER | Facility: CLINIC | Age: 54
End: 2019-12-23
Payer: COMMERCIAL

## 2019-12-23 VITALS
SYSTOLIC BLOOD PRESSURE: 110 MMHG | DIASTOLIC BLOOD PRESSURE: 62 MMHG | HEIGHT: 64 IN | WEIGHT: 206 LBS | BODY MASS INDEX: 35.17 KG/M2

## 2019-12-23 DIAGNOSIS — G47.33 OSA (OBSTRUCTIVE SLEEP APNEA): Primary | ICD-10-CM

## 2019-12-23 PROCEDURE — 99244 OFF/OP CNSLTJ NEW/EST MOD 40: CPT | Performed by: PSYCHIATRY & NEUROLOGY

## 2019-12-23 NOTE — PROGRESS NOTES
Sleep Medicine Consultation Note    HPI:  Ms Janet Mcconnell is a 48 y o  female seen at the request of Korey Rosales MD for advice regarding suspected sleep disordered breathing  The patient is seeing Neurology due to memory loss  A home sleep study was done in November 2019 which showed at least moderate LUCY with an EMERY of 19 and vicente of 77%  CPAP was recommended  The patient stated that over the course of the last year, she saw her memory starting to be impaired, but this has improved since stopping Oxybutinin in September 2019  She feels that her sleep is overall good, but she wakes up a few times a night and believes it is due to her  waking her up  She thinks she sleep soundly, unless her  moves, gets out of bed, snores, or reads  She wakes with headaches, particularly in the last month  This has been almost daily in the last few weeks  She takes advil or has caffeine, and this usually helps  Nothing that she has noticed makes it worse  She denied having trouble waking up in the morning  She has a good amount of energy during the day, she stated and every now and then, she will nap in the afternoon  She will doze off as a passenger in the car  She denied dozing at any other time  She does get tired when driving long distances  She gets sleepy at that time, but denied dozed off behind the wheel  She will sing and listen to the radio  She has pulled over and napped in the past  She denied close calls or accidents       Please see below for continuation of the HPI:      Sleep Disordered Breathing:  -Snoring: yes   -Severity: loud   -Frequency: unsure, but probably every night   -Duration: 6 years   -Over time: worsened   -Modifying factors: weight gain  -Observed Apneas: no  -Mouth Breathing at night: sometimes  -Dry Mouth in morning: no   -Nocturnal Gasping: denied  -Nasal Obstruction: only if sick  -Weight: fluctuated in the last year, but in the last 12 years has gained 40-50 lbs    Sleep Pattern:  -Location: bedroom  -Bed/Recliner/Wedge: bed  -Bed Partner: yes  -HOB:flat  -# of pillows under head: 1  -Position: side  -Bedtime: 10pm-12am  -Lights out: same time  -Environmental:  may have TV or lights on but this does not bother her  -Latency: within 20 mins  -Awakenings: 1-2   -Reason:    -Duration: mins  -Wake time: 6am   -Alarm: yes  -Rise time: same time  -Days off: 10pm-12am until 8am  -Shift Work: no set schedule  -Patient's estimate of total sleep time: 6 at least    Daytime Symptoms:  -Upon Awakening: fine, has a headache  Uses a light in the winter   -Daytime fatigue/sleepiness: usually does well  -Naps: once in a while  -Involuntary Dozing: see HPI  -Cognitive Symptoms: memory issues have improved  -Driving: Difficulty with sleepiness and driving:  See HPI   -- Close calls related to sleepiness: denied   -- Accidents related to sleepiness: denied      Questionnaires:   Sitting and reading: Would never doze  Watching TV: Would never doze  Sitting, inactive in a public place (e g  a theatre or a meeting): Would never doze  As a passenger in a car for an hour without a break: Moderate chance of dozing  Lying down to rest in the afternoon when circumstances permit: Moderate chance of dozing  Sitting and talking to someone: Would never doze  Sitting quietly after a lunch without alcohol: Would never doze  In a car, while stopped for a few minutes in traffic: Would never doze  Total score: 4      Sleep Review of Symptoms:  -Parasomnias:  --Sleep Walking: denied  --Dream Enactment: denied  --Bruxism: unsure   But she does feel tension in her jaw when she wakes up  -Motor:  --RLS: denied  --PLMS: denied  -Narcolepsy:  --Hallucinations: denied  --Paralysis: denied  --Cataplexy: denied    Childhood Sleep History: denied    Prior Sleep Studies/Evaluations:  HST 11/2019    Family History:  Family history of sleep disorders: dad has LUCY    Patient Active Problem List Diagnosis    Allergic reaction    Abdominal pain    Abnormal involuntary movements    Asymptomatic PVCs    Hyperlipidemia    Major depressive disorder, recurrent episode, moderate (HCC)    Mitral valve disorder    Nontoxic uninodular goiter    Obesity (BMI 30 0-34  9)    Panic disorder    Sciatica    Strain of gastrocnemius muscle of left lower extremity    Urinary incontinence in female    Chronic pain of right ankle    Memory loss    Foreign body (FB) in soft tissue    Vitamin D deficiency    LUCY (obstructive sleep apnea)     Past Medical History:   Diagnosis Date    Depression     Incontinence     MVP (mitral valve prolapse)        --> Denies any other cardiopulmonary disease  --> Seizure hx: denies  --> Head injury with LOC: denies  --> Supplemental Oxygen Use: denies    Labs     Results for Aracely Quintanilla (MRN [id number]) as of 12/23/2019 08:50   Ref   Range 9/14/2019 11:04   Sodium Latest Ref Range: 135 - 146 mmol/L 139   Potassium Latest Ref Range: 3 5 - 5 3 mmol/L 4 7   Chloride Latest Ref Range: 98 - 110 mmol/L 104   CO2 Latest Ref Range: 20 - 32 mmol/L 31   BUN Latest Ref Range: 7 - 25 mg/dL 15   Creatinine Latest Ref Range: 0 50 - 1 05 mg/dL 0 83   SL AMB BUN/CREATININE RATIO Latest Ref Range: 6 - 22 (calc) NOT APPLICABLE   Glucose, Random Latest Ref Range: 65 - 99 mg/dL 109 (H)   AST Latest Ref Range: 10 - 35 U/L 11   ALT Latest Ref Range: 6 - 29 U/L 12   Alkaline Phosphatase Latest Ref Range: 33 - 130 U/L 89   Total Protein Latest Ref Range: 6 1 - 8 1 g/dL 7 1   Albumin Latest Ref Range: 3 6 - 5 1 g/dL 4 1   TOTAL BILIRUBIN Latest Ref Range: 0 2 - 1 2 mg/dL 0 4   eGFR African American Latest Ref Range: > OR = 60 mL/min/1 73m2 94   eGFR Non  Latest Ref Range: > OR = 60 mL/min/1 73m2 81   Albumin/Globulin Ratio Latest Ref Range: 1 0 - 2 5 (calc) 1 4   Cholesterol Latest Ref Range: <200 mg/dL 248 (H)   Triglycerides Latest Ref Range: <150 mg/dL 119   HDL Latest Ref Range: >50 mg/dL 69   Non-HDL Cholesterol Latest Ref Range: <130 mg/dL (calc) 179 (H)   LDL Direct Latest Units: mg/dL (calc) 155 (H)   Chol HDLC Ratio Latest Ref Range: <5 0 (calc) 3 6   Vitamin B-12 Latest Ref Range: 200 - 1,100 pg/mL 503   Globulin Latest Ref Range: 1 9 - 3 7 g/dL (calc) 3 0   White Blood Cell Count Latest Ref Range: 3 8 - 10 8 Thousand/uL 6 7   Red Blood Cell Count Latest Ref Range: 3 80 - 5 10 Million/uL 4 84   Hemoglobin Latest Ref Range: 11 7 - 15 5 g/dL 14 4   HCT Latest Ref Range: 35 0 - 45 0 % 43 1   MCV Latest Ref Range: 80 0 - 100 0 fL 89 0   MCH Latest Ref Range: 27 0 - 33 0 pg 29 8   MCHC   Latest Ref Range: 32 0 - 36 0 g/dL 33 4   RDW Latest Ref Range: 11 0 - 15 0 % 13 6   Platelet Count Latest Ref Range: 140 - 400 Thousand/uL 279   Neutrophils Latest Units: % 57 1   Lymphocytes Latest Units: % 32 5   Monocytes Latest Units: % 7 0   Eosinophils Latest Units: % 2 1   Basophils PCT Latest Units: % 1 3   Neutrophils (Absolute) Latest Ref Range: 1,500 - 7,800 cells/uL 3,826   Lymphocytes (Absolute) Latest Ref Range: 850 - 3,900 cells/uL 2,178   Monocytes (Absolute) Latest Ref Range: 200 - 950 cells/uL 469   Eosinophils (Absolute) Latest Ref Range: 15 - 500 cells/uL 141   Basophils ABS Latest Ref Range: 0 - 200 cells/uL 87   Always Message Unknown See Comment   SL AMB CALCIUM Latest Ref Range: 8 6 - 10 4 mg/dL 9 1   SL AMB MPV Latest Ref Range: 7 5 - 12 5 fL 10 3   TSH W/RFX TO FREE T4 Latest Units: mIU/L 2 33   EXTERNAL VITAMIN D,25 Latest Ref Range: 30 - 100 ng/mL 23 (L)       Past Surgical History:   Procedure Laterality Date    NO PAST SURGERIES         --> ENT procedures: denies    Current Outpatient Medications   Medication Sig Dispense Refill    clonazePAM (KlonoPIN) 0 5 mg tablet Take 1 tablet (0 5 mg total) by mouth 2 (two) times a day (Patient taking differently: Take 0 5 mg by mouth 2 (two) times a day as needed ) 60 tablet 0    escitalopram (LEXAPRO) 10 mg tablet TAKE ONE TABLET BY MOUTH DAILY 90 tablet 0    escitalopram (LEXAPRO) 5 mg tablet TAKE ONE TABLET BY MOUTH DAILY 90 tablet 0    valACYclovir (VALTREX) 500 mg tablet       metoprolol succinate (TOPROL-XL) 25 mg 24 hr tablet Take 12 5 mg by mouth        No current facility-administered medications for this visit  Social History:  -Employment: writer  -Smoking: denied  -Caffeine: 16 oz of coffee a day, 8 oz of soda  -Alcohol: denied  -THC: denied  -OTC/Supplements/herbals: claritin daily  -Illicits:  denies  -Family: lives with   ROS:  Genitourinary none   Cardiology none   Gastrointestinal none   Neurology frequent headaches, awaken with headache, forgetfulness, poor concentration or confusion,  and difficulty with memory   Constitutional excessive sweating at night   Integumentary none   Psychiatry none   Musculoskeletal back pain   Pulmonary snoring   ENT none   Endocrine none   Hematological none       MSE:  -Alert and appropriate: alert, calm, cooperative  -Oriented to person, place and time:  name, age, location, day/date/mon/yr  -Behavior: good, sustained eye contact  -Speech: Unremarkable rate/rhythm/volume  -Mood: "fine"  -Affect: full range  -Thought Processes: linear, logical, goal directed  PE:  Body mass index is 35 36 kg/m²    Vitals:    12/23/19 0800   BP: 110/62   Weight: 93 4 kg (206 lb)   Height: 5' 4" (1 626 m)       -General:  In NAD    -Eyes: Conjunctival injection: none     -EOM:  PERRLA, EOMI   -Eyelid hooding: no    -ENT: MP: 3/4   -Facial deformity: no retrognathia   -Hard palate: moderate arch   -Soft palate:  Crowding with low set palate and elongated uvula   -Gums and teeth: normal dentition   -Tongue:  Scalloping   -Nares:  Patent    -Neck/Lymphatics: Lymphadenopathy:  none appreciated   -Masses:  none appreciated   -Circumference: Neck Circumference: 13 5 "    -Cardiac: Auscultation:  RRR   - LE edema over shins: none appreciated    -Pulm: -Respirations: unlaboured -Auscultation:  CTA bilaterally, posterior fields    -Neuro: No resting tremor     -Musculoskeletal: Gait and stance: normal turning and ambulation; unremarkable  Assessment:  Ms Rich Salomon is a 48 y o  female who is seen to evaluate for possible obstructive sleep apnea  The patient is symptomatic with frequent nocturnal awakenings, daytime drowsiness, headaches upon awakening, and snoring  Her father has LUCY and uses a CPAP  She has been sleepy while driving in the past when she goes on long trips  She had borderline oxygen levels with at least moderate LUCY on the HST and therefore, CPAP titration is recommended  The pathophysiology of, the reasons to treat and treatment options for obstructive sleep apnea were all reviewed with the patient today  Discussed the testing options and reviewed the benefits and downsides of both, patient opted for the in lab CPAP titration  Discussed Oral appliance as a treatment option as well  She is amenable to treatment with PAP therapy  Discussed keeping nasal passages clear, abstaining from alcohol, and other sedating drugs at night- which will worsen symptoms of LUCY  --History provided by: patient   --Records reviewed: in chart and HST      Recommendations:  1) CPAP titration  2) Driving safety was reviewed with patient  If the patient feels too sleepy to drive she knows not to drive  If she becomes sleepy while driving she will pull over and nap  3) Follow-up 6-8 weeks after starting CPAP  4) Call with any questions or concerns  All questions answered for the patient, who indicated understanding and agreed with the plan       Andrés tSahl MD  Psychiatry/ Sleep medicine

## 2019-12-23 NOTE — PATIENT INSTRUCTIONS
Recommendations:  1) CPAP titration  2) Driving safety was reviewed with patient  If the patient feels too sleepy to drive she knows not to drive  If she becomes sleepy while driving she will pull over and nap  3) Follow-up 6-8 weeks after starting CPAP  4) Call with any questions or concerns

## 2019-12-23 NOTE — LETTER
December 23, 2019     [address], [address] [facility] [place] [address]    Patient: Autumn Morgan   YOB: 1965   Date of Visit: 12/23/2019       Dear Dr Ángel Sanon:    Thank you for referring Autumn Morgan to me for evaluation  Below are my notes for this consultation  If you have questions, please do not hesitate to call me  I look forward to following your patient along with you  Sincerely,        Syed Cagle MD        CC: DO Syed Garnica MD  12/23/2019  9:23 AM  Signed  Sleep Medicine Consultation Note    HPI:  Ms Autumn Morgan is a 48 y o  female seen at the request of Cara Gomez MD for advice regarding suspected sleep disordered breathing  The patient is seeing Neurology due to memory loss  A home sleep study was done in November 2019 which showed at least moderate LUCY with an EMERY of 19 and vicente of 77%  CPAP was recommended  The patient stated that over the course of the last year, she saw her memory starting to be impaired, but this has improved since stopping Oxybutinin in September 2019  She feels that her sleep is overall good, but she wakes up a few times a night and believes it is due to her  waking her up  She thinks she sleep soundly, unless her  moves, gets out of bed, snores, or reads  She wakes with headaches, particularly in the last month  This has been almost daily in the last few weeks  She takes advil or has caffeine, and this usually helps  Nothing that she has noticed makes it worse  She denied having trouble waking up in the morning  She has a good amount of energy during the day, she stated and every now and then, she will nap in the afternoon  She will doze off as a passenger in the car  She denied dozing at any other time  She does get tired when driving long distances  She gets sleepy at that time, but denied dozed off behind the wheel  She will sing and listen to the radio   She has pulled over and napped in the past  She denied close calls or accidents  Please see below for continuation of the HPI:      Sleep Disordered Breathing:  -Snoring: yes   -Severity: loud   -Frequency: unsure, but probably every night   -Duration: 6 years   -Over time: worsened   -Modifying factors: weight gain  -Observed Apneas: no  -Mouth Breathing at night: sometimes  -Dry Mouth in morning: no   -Nocturnal Gasping: denied  -Nasal Obstruction: only if sick  -Weight: fluctuated in the last year, but in the last 12 years has gained 40-50 lbs    Sleep Pattern:  -Location: bedroom  -Bed/Recliner/Wedge: bed  -Bed Partner: yes  -HOB:flat  -# of pillows under head: 1  -Position: side  -Bedtime: 10pm-12am  -Lights out: same time  -Environmental:  may have TV or lights on but this does not bother her  -Latency: within 20 mins  -Awakenings: 1-2   -Reason:    -Duration: mins  -Wake time: 6am   -Alarm: yes  -Rise time: same time  -Days off: 10pm-12am until 8am  -Shift Work: no set schedule  -Patient's estimate of total sleep time: 6 at least    Daytime Symptoms:  -Upon Awakening: fine, has a headache  Uses a light in the winter   -Daytime fatigue/sleepiness: usually does well  -Naps: once in a while  -Involuntary Dozing: see HPI  -Cognitive Symptoms: memory issues have improved  -Driving: Difficulty with sleepiness and driving:  See HPI   -- Close calls related to sleepiness: denied   -- Accidents related to sleepiness: denied      Questionnaires:   Sitting and reading: Would never doze  Watching TV: Would never doze  Sitting, inactive in a public place (e g  a theatre or a meeting): Would never doze  As a passenger in a car for an hour without a break:  Moderate chance of dozing  Lying down to rest in the afternoon when circumstances permit: Moderate chance of dozing  Sitting and talking to someone: Would never doze  Sitting quietly after a lunch without alcohol: Would never doze  In a car, while stopped for a few minutes in traffic: Would never doze  Total score: 4      Sleep Review of Symptoms:  -Parasomnias:  --Sleep Walking: denied  --Dream Enactment: denied  --Bruxism: unsure  But she does feel tension in her jaw when she wakes up  -Motor:  --RLS: denied  --PLMS: denied  -Narcolepsy:  --Hallucinations: denied  --Paralysis: denied  --Cataplexy: denied    Childhood Sleep History: denied    Prior Sleep Studies/Evaluations:  HST 11/2019    Family History:  Family history of sleep disorders: dad has LUCY    Patient Active Problem List   Diagnosis    Allergic reaction    Abdominal pain    Abnormal involuntary movements    Asymptomatic PVCs    Hyperlipidemia    Major depressive disorder, recurrent episode, moderate (HCC)    Mitral valve disorder    Nontoxic uninodular goiter    Obesity (BMI 30 0-34  9)    Panic disorder    Sciatica    Strain of gastrocnemius muscle of left lower extremity    Urinary incontinence in female    Chronic pain of right ankle    Memory loss    Foreign body (FB) in soft tissue    Vitamin D deficiency    LUCY (obstructive sleep apnea)     Past Medical History:   Diagnosis Date    Depression     Incontinence     MVP (mitral valve prolapse)        --> Denies any other cardiopulmonary disease  --> Seizure hx: denies  --> Head injury with LOC: denies  --> Supplemental Oxygen Use: denies    Labs     Results for Danuta Gray (MRN [id number]) as of 12/23/2019 08:50   Ref   Range 9/14/2019 11:04   Sodium Latest Ref Range: 135 - 146 mmol/L 139   Potassium Latest Ref Range: 3 5 - 5 3 mmol/L 4 7   Chloride Latest Ref Range: 98 - 110 mmol/L 104   CO2 Latest Ref Range: 20 - 32 mmol/L 31   BUN Latest Ref Range: 7 - 25 mg/dL 15   Creatinine Latest Ref Range: 0 50 - 1 05 mg/dL 0 83   SL AMB BUN/CREATININE RATIO Latest Ref Range: 6 - 22 (calc) NOT APPLICABLE   Glucose, Random Latest Ref Range: 65 - 99 mg/dL 109 (H)   AST Latest Ref Range: 10 - 35 U/L 11   ALT Latest Ref Range: 6 - 29 U/L 12   Alkaline Phosphatase Latest Ref Range: 33 - 130 U/L 89   Total Protein Latest Ref Range: 6 1 - 8 1 g/dL 7 1   Albumin Latest Ref Range: 3 6 - 5 1 g/dL 4 1   TOTAL BILIRUBIN Latest Ref Range: 0 2 - 1 2 mg/dL 0 4   eGFR African American Latest Ref Range: > OR = 60 mL/min/1 73m2 94   eGFR Non  Latest Ref Range: > OR = 60 mL/min/1 73m2 81   Albumin/Globulin Ratio Latest Ref Range: 1 0 - 2 5 (calc) 1 4   Cholesterol Latest Ref Range: <200 mg/dL 248 (H)   Triglycerides Latest Ref Range: <150 mg/dL 119   HDL Latest Ref Range: >50 mg/dL 69   Non-HDL Cholesterol Latest Ref Range: <130 mg/dL (calc) 179 (H)   LDL Direct Latest Units: mg/dL (calc) 155 (H)   Chol HDLC Ratio Latest Ref Range: <5 0 (calc) 3 6   Vitamin B-12 Latest Ref Range: 200 - 1,100 pg/mL 503   Globulin Latest Ref Range: 1 9 - 3 7 g/dL (calc) 3 0   White Blood Cell Count Latest Ref Range: 3 8 - 10 8 Thousand/uL 6 7   Red Blood Cell Count Latest Ref Range: 3 80 - 5 10 Million/uL 4 84   Hemoglobin Latest Ref Range: 11 7 - 15 5 g/dL 14 4   HCT Latest Ref Range: 35 0 - 45 0 % 43 1   MCV Latest Ref Range: 80 0 - 100 0 fL 89 0   MCH Latest Ref Range: 27 0 - 33 0 pg 29 8   MCHC   Latest Ref Range: 32 0 - 36 0 g/dL 33 4   RDW Latest Ref Range: 11 0 - 15 0 % 13 6   Platelet Count Latest Ref Range: 140 - 400 Thousand/uL 279   Neutrophils Latest Units: % 57 1   Lymphocytes Latest Units: % 32 5   Monocytes Latest Units: % 7 0   Eosinophils Latest Units: % 2 1   Basophils PCT Latest Units: % 1 3   Neutrophils (Absolute) Latest Ref Range: 1,500 - 7,800 cells/uL 3,826   Lymphocytes (Absolute) Latest Ref Range: 850 - 3,900 cells/uL 2,178   Monocytes (Absolute) Latest Ref Range: 200 - 950 cells/uL 469   Eosinophils (Absolute) Latest Ref Range: 15 - 500 cells/uL 141   Basophils ABS Latest Ref Range: 0 - 200 cells/uL 87   Always Message Unknown See Comment   SL AMB CALCIUM Latest Ref Range: 8 6 - 10 4 mg/dL 9 1   SL AMB MPV Latest Ref Range: 7 5 - 12 5 fL 10 3   TSH W/RFX TO FREE T4 Latest Units: mIU/L 2 33   EXTERNAL VITAMIN D,25 Latest Ref Range: 30 - 100 ng/mL 23 (L)       Past Surgical History:   Procedure Laterality Date    NO PAST SURGERIES         --> ENT procedures: denies    Current Outpatient Medications   Medication Sig Dispense Refill    clonazePAM (KlonoPIN) 0 5 mg tablet Take 1 tablet (0 5 mg total) by mouth 2 (two) times a day (Patient taking differently: Take 0 5 mg by mouth 2 (two) times a day as needed ) 60 tablet 0    escitalopram (LEXAPRO) 10 mg tablet TAKE ONE TABLET BY MOUTH DAILY 90 tablet 0    escitalopram (LEXAPRO) 5 mg tablet TAKE ONE TABLET BY MOUTH DAILY 90 tablet 0    valACYclovir (VALTREX) 500 mg tablet       metoprolol succinate (TOPROL-XL) 25 mg 24 hr tablet Take 12 5 mg by mouth        No current facility-administered medications for this visit  Social History:  -Employment: writer  -Smoking: denied  -Caffeine: 16 oz of coffee a day, 8 oz of soda  -Alcohol: denied  -THC: denied  -OTC/Supplements/herbals: claritin daily  -Illicits:  denies  -Family: lives with   ROS:  Genitourinary none   Cardiology none   Gastrointestinal none   Neurology frequent headaches, awaken with headache, forgetfulness, poor concentration or confusion,  and difficulty with memory   Constitutional excessive sweating at night   Integumentary none   Psychiatry none   Musculoskeletal back pain   Pulmonary snoring   ENT none   Endocrine none   Hematological none       MSE:  -Alert and appropriate: alert, calm, cooperative  -Oriented to person, place and time:  name, age, location, day/date/mon/yr  -Behavior: good, sustained eye contact  -Speech: Unremarkable rate/rhythm/volume  -Mood: "fine"  -Affect: full range  -Thought Processes: linear, logical, goal directed  PE:  Body mass index is 35 36 kg/m²    Vitals:    12/23/19 0800   BP: 110/62   Weight: 93 4 kg (206 lb)   Height: 5' 4" (1 626 m)       -General:  In NAD    -Eyes: Conjunctival injection: none -EOM:  PERRLA, EOMI   -Eyelid hooding: no    -ENT: MP: 3/4   -Facial deformity: no retrognathia   -Hard palate: moderate arch   -Soft palate:  Crowding with low set palate and elongated uvula   -Gums and teeth: normal dentition   -Tongue:  Scalloping   -Nares:  Patent    -Neck/Lymphatics: Lymphadenopathy:  none appreciated   -Masses:  none appreciated   -Circumference: Neck Circumference: 13 5 "    -Cardiac: Auscultation:  RRR   - LE edema over shins: none appreciated    -Pulm: -Respirations: unlaboured         -Auscultation:  CTA bilaterally, posterior fields    -Neuro: No resting tremor     -Musculoskeletal: Gait and stance: normal turning and ambulation; unremarkable  Assessment:  Ms Grecia Maldonado is a 48 y o  female who is seen to evaluate for possible obstructive sleep apnea  The patient is symptomatic with frequent nocturnal awakenings, daytime drowsiness, headaches upon awakening, and snoring  Her father has LUCY and uses a CPAP  She has been sleepy while driving in the past when she goes on long trips  She had borderline oxygen levels with at least moderate LUCY on the HST and therefore, CPAP titration is recommended  The pathophysiology of, the reasons to treat and treatment options for obstructive sleep apnea were all reviewed with the patient today  Discussed the testing options and reviewed the benefits and downsides of both, patient opted for the in lab CPAP titration  Discussed Oral appliance as a treatment option as well  She is amenable to treatment with PAP therapy  Discussed keeping nasal passages clear, abstaining from alcohol, and other sedating drugs at night- which will worsen symptoms of LUCY  --History provided by: patient   --Records reviewed: in chart and HST      Recommendations:  1) CPAP titration  2) Driving safety was reviewed with patient  If the patient feels too sleepy to drive she knows not to drive    If she becomes sleepy while driving she will pull over and nap  3) Follow-up 6-8 weeks after starting CPAP  4) Call with any questions or concerns  All questions answered for the patient, who indicated understanding and agreed with the plan       Syed Cagle MD  Psychiatry/ Sleep medicine

## 2020-01-03 ENCOUNTER — HOSPITAL ENCOUNTER (OUTPATIENT)
Dept: SLEEP CENTER | Facility: CLINIC | Age: 55
Discharge: HOME | End: 2020-01-03
Payer: COMMERCIAL

## 2020-01-03 DIAGNOSIS — G47.33 OSA (OBSTRUCTIVE SLEEP APNEA): ICD-10-CM

## 2020-01-03 PROCEDURE — 95811 POLYSOM 6/>YRS CPAP 4/> PARM: CPT | Performed by: PSYCHIATRY & NEUROLOGY

## 2020-01-03 PROCEDURE — 95811 POLYSOM 6/>YRS CPAP 4/> PARM: CPT

## 2020-01-04 NOTE — PROGRESS NOTES
Sleep Study Documentation    Pre-Sleep Study       Sleep testing procedure explained to patient:YES    Patient napped prior to study:NO    Caffeine:Dayshift worker after 12PM   Caffeine use:NO    Alcohol:Dayshift workers after 5PM: Alcohol use:NO    Typical day for patient:YES       Study Documentation    Sleep Study Indications: Loud Snoring, Nocturnal Awakenings, Headaches upon awakening, daytime drowsiness     Sleep Study: Treatment   Optimal PAP pressure: 9 cm   Leak:None  Snore:Eliminated  REM Obtained:yes  Supplemental O2: no    Minimum SaO2   87%  Baseline SaO2 95%  PAP mask tried (list all) Size small ResMed AirFit P10 nasal pillows, Size medium ResMed Airfit N20 nasal mask   PAP mask choice (final) Size small ResMed AirFit P10 nasal pillows   PAP mask type:pillows  PAP pressure at which snoring was eliminated 6 cm  Minimum SaO2 at final PAP pressure  90%  Mode of Therapy:CPAP  ETCO2:No  CPAP changed to BiPAP:No    Mode of Therapy:CPAP    EKG abnormalities: no     EEG abnormalities: no    Sleep Study Recorded < 2 hours: N/A    Sleep Study Recorded > 2 hours but incomplete study: N/A    Sleep Study Recorded 6 hours but no sleep obtained: NO    Patient classification: employed       Post-Sleep Study    Medication used at bedtime or during sleep study:NO    Patient reports time it took to fall asleep:20 to 30 minutes    Patient reports waking up during study:3 or more times  Patient reports returning to sleep without difficulty  Patient reports sleeping 6 to 8 hours with dreaming  Patient reports sleep during study:typical    Patient rated sleepiness: Somewhat sleepy or tired    PAP treatment:yes: Post PAP treatment patient reports feeling unchanged and would wear PAP mask at home

## 2020-01-06 DIAGNOSIS — G47.33 OSA (OBSTRUCTIVE SLEEP APNEA): Primary | ICD-10-CM

## 2020-01-08 ENCOUNTER — TELEPHONE (OUTPATIENT)
Dept: SLEEP CENTER | Facility: CLINIC | Age: 55
End: 2020-01-08

## 2020-01-08 NOTE — TELEPHONE ENCOUNTER
----- Message from Jerri Dias MD sent at 1/6/2020  9:21 AM EST -----  CPAP titration read  CPAP ordered  Follow up in 6-8 weeks

## 2020-01-13 NOTE — TELEPHONE ENCOUNTER
I spoke with patient, she would like to use [facility]  Scheduled DME set up 1/21/20 and compliance follow up 2/27/20 with Dr Dilcia Dias in [place]

## 2020-01-21 ENCOUNTER — TELEPHONE (OUTPATIENT)
Dept: SLEEP CENTER | Facility: CLINIC | Age: 55
End: 2020-01-21

## 2020-02-27 ENCOUNTER — OFFICE VISIT (OUTPATIENT)
Dept: SLEEP CENTER | Facility: CLINIC | Age: 55
End: 2020-02-27
Payer: COMMERCIAL

## 2020-02-27 VITALS
HEIGHT: 64 IN | DIASTOLIC BLOOD PRESSURE: 70 MMHG | SYSTOLIC BLOOD PRESSURE: 110 MMHG | HEART RATE: 85 BPM | BODY MASS INDEX: 36.02 KG/M2 | WEIGHT: 211 LBS

## 2020-02-27 DIAGNOSIS — G47.33 OSA (OBSTRUCTIVE SLEEP APNEA): Primary | ICD-10-CM

## 2020-02-27 DIAGNOSIS — R41.3 MEMORY LOSS: ICD-10-CM

## 2020-02-27 PROCEDURE — 1036F TOBACCO NON-USER: CPT | Performed by: PSYCHIATRY & NEUROLOGY

## 2020-02-27 PROCEDURE — 3008F BODY MASS INDEX DOCD: CPT | Performed by: PSYCHIATRY & NEUROLOGY

## 2020-02-27 PROCEDURE — 99214 OFFICE O/P EST MOD 30 MIN: CPT | Performed by: PSYCHIATRY & NEUROLOGY

## 2020-02-27 NOTE — PROGRESS NOTES
Sleep Medicine Follow-Up Note    HPI: 49yo F with LUCY presenting for a compliance visit  Treatment Summary: A home sleep study was done in 2019 which showed at least moderate LUCY with an EMERY of 19 and vicente of 77%  CPAP was recommended  In 2019 had a CPAP study which recommended CPAP 9cm  HPI:   Today, patient presents unaccompanied  She stated that since using the CPAP, she has been waking up very tired, tired during the day, having to take a nap, and sleepy earlier on during the evening most days out of the week  She is having fewer headaches upon awakening  She was using the ramp function in the beginning and now can tolerate it better  The mask does make noise at times and this is with repositioning         Treatment: CPAP  -Pressure: 9cm   -Pressure intolerance: no   -Aerophagia: no   -Air Hunger: no  -Interface: NP  -Fit: good, but leaks  -Chin strap: no  -HCC: YME  -Patient's perceived outcome: not helpful yet, still tired    Compliance Card Data:    - Date Range: 20-20   - Settincm   - Residual AHI: 3 3   - %  Night in Large Leak: 0sec   - Average usage days used: 5h 7m   - % Days used: 73 3%   - % Days with Usage > 4 hrs: 60%    Respiratory:  -Ongoing Snoring: no  -Mouth Breathing: no  -Dry Mouth: no  -Nocturnal Gasping: no    ROS:   Genitourinary none   Cardiology none   Gastrointestinal none   Neurology awaken with headache   Constitutional excessive sweating at night   Integumentary none   Psychiatry none   Musculoskeletal none   Pulmonary none   ENT none   Endocrine none   Hematological none         Sleep Pattern:  -Position: side and back  -Bedtime: 8p-1a  -Lights Out: depends  -Environment: Lights on for reading or is watching TV  -Latency: 5-30 mins  -Awakenings: 2-5 with leaks  -Wake Time: 6-730am  -Rise Time: same time  -Patient's estimate of Sleep Time: 5-7h    Daytime Symptoms:  -Upon Awakening: tired  -Daytime fatigue/sleepiness: tired  -Naps: some times  -Involuntary Dozing: denied  -Cognitive Symptoms: unchanged since changing oxybutin  -Driving: Difficulty with sleepiness and driving:  denied   -- Close calls related to sleepiness: denied   -- Accidents related to sleepiness: denied    Substance Use:  -Caffeine: 1-2 cups of coffee a day  -Alcohol: denied  -THC: denied    --> Denies any significant medical changes since last visit  --> Supplemental Oxygen Use: denies    Questionnaire:  Sitting and reading: Would never doze  Watching TV: Would never doze  Sitting, inactive in a public place (e g  a theatre or a meeting): Would never doze  As a passenger in a car for an hour without a break: Slight chance of dozing  Lying down to rest in the afternoon when circumstances permit: High chance of dozing  Sitting and talking to someone: Would never doze  Sitting quietly after a lunch without alcohol: Would never doze  In a car, while stopped for a few minutes in traffic: Would never doze  Total score: 4      PE:    /70   Pulse 85   Ht 5' 4" (1 626 m)   Wt 95 7 kg (211 lb)   BMI 36 22 kg/m²     General:  In NAD  Pul: Respirations: unlaboured  MS: No atrophy  Neuro: No resting tremor  Gait normal turning & station; unremarkable overall  Psych: Socially appropriate  Pleasant  No overt dysphoria  Assessment: The patient has been trying to be compliant with her CPAP, but has had some difficulty getting used to it  she is trying and willing to continue to try to use her CPAP all night and more consistently  She is having some trouble with her mask, as her nasal pillows come out and bother her  This wakes her up and she ends up feeling more tired then she did before using CPAP  Her obstructive events are well controlled with a residual AHI 3 3  She is no longer snoring with them  she is staying in bed to watch TV at times because she is tired and this poor sleep hygiene, which may lead to insomnia in the future   Will not make any changes to her pressure today, but will have her try either a FFM or NM  Recommendations:    1) CPAP at 9cm change to FFM or NM  2) Safe driving reviewed  3) Follow-up 3 months  4) Call with any questions or concerns  All questions answered for the patient, who indicated understanding and agreed with the plan       [place] MD [place]  Psychiatry/ Sleep medicine

## 2020-02-27 NOTE — PATIENT INSTRUCTIONS
Recommendations:    1) CPAP at 9cm change to FFM or NM  2) Safe driving reviewed  3) Follow-up 3 months  4) Call with any questions or concerns

## 2020-02-28 ENCOUNTER — TELEPHONE (OUTPATIENT)
Dept: SLEEP CENTER | Facility: CLINIC | Age: 55
End: 2020-02-28

## 2020-03-02 DIAGNOSIS — F33.1 MAJOR DEPRESSIVE DISORDER, RECURRENT EPISODE, MODERATE (HCC): ICD-10-CM

## 2020-03-02 RX ORDER — ESCITALOPRAM OXALATE 10 MG/1
TABLET ORAL
Qty: 90 TABLET | Refills: 0 | Status: SHIPPED | OUTPATIENT
Start: 2020-03-02 | End: 2020-06-16

## 2020-03-02 RX ORDER — ESCITALOPRAM OXALATE 5 MG/1
TABLET ORAL
Qty: 90 TABLET | Refills: 0 | Status: SHIPPED | OUTPATIENT
Start: 2020-03-02 | End: 2020-06-16

## 2020-04-15 ENCOUNTER — TELEPHONE (OUTPATIENT)
Dept: UROLOGY | Facility: AMBULATORY SURGERY CENTER | Age: 55
End: 2020-04-15

## 2020-06-16 DIAGNOSIS — F33.1 MAJOR DEPRESSIVE DISORDER, RECURRENT EPISODE, MODERATE (HCC): ICD-10-CM

## 2020-06-16 RX ORDER — ESCITALOPRAM OXALATE 10 MG/1
TABLET ORAL
Qty: 90 TABLET | Refills: 0 | Status: SHIPPED | OUTPATIENT
Start: 2020-06-16 | End: 2020-09-20

## 2020-06-16 RX ORDER — ESCITALOPRAM OXALATE 5 MG/1
TABLET ORAL
Qty: 90 TABLET | Refills: 0 | Status: SHIPPED | OUTPATIENT
Start: 2020-06-16 | End: 2020-09-20

## 2020-07-14 ENCOUNTER — OFFICE VISIT (OUTPATIENT)
Dept: FAMILY MEDICINE CLINIC | Facility: CLINIC | Age: 55
End: 2020-07-14
Payer: COMMERCIAL

## 2020-07-14 VITALS
DIASTOLIC BLOOD PRESSURE: 80 MMHG | TEMPERATURE: 98.2 F | HEART RATE: 76 BPM | WEIGHT: 205 LBS | BODY MASS INDEX: 35 KG/M2 | HEIGHT: 64 IN | SYSTOLIC BLOOD PRESSURE: 118 MMHG | OXYGEN SATURATION: 98 %

## 2020-07-14 DIAGNOSIS — B07.8 COMMON WART: Primary | ICD-10-CM

## 2020-07-14 PROCEDURE — 3008F BODY MASS INDEX DOCD: CPT | Performed by: FAMILY MEDICINE

## 2020-07-14 PROCEDURE — 99213 OFFICE O/P EST LOW 20 MIN: CPT | Performed by: FAMILY MEDICINE

## 2020-07-14 PROCEDURE — 1036F TOBACCO NON-USER: CPT | Performed by: FAMILY MEDICINE

## 2020-07-14 PROCEDURE — 17110 DESTRUCTION B9 LES UP TO 14: CPT | Performed by: FAMILY MEDICINE

## 2020-07-14 NOTE — PROGRESS NOTES
Assessment/Plan:      There are no diagnoses linked to this encounter  Subjective:     Patient ID: Bree Sauer is a 50 y o  female  Complains of persistent work for on the palmar aspect of her right index finger  She did do treatment at home  She also spoke to a dermatologist about the work  She would like to try cryo surgery  Review of Systems   Constitutional: Negative  Skin:        As in HPI  Objective:     Physical Exam   Constitutional: She appears well-developed and well-nourished  Skin:   Wart right index finger  Nursing note and vitals reviewed  Lesion Destruction  Date/Time: 7/14/2020 12:41 PM  Performed by: Julienne Aguilar DO  Authorized by: Julienne Aguilar DO     Procedure Details - Lesion Destruction:     Number of Lesions:  1  Lesion 1:     Body area:  Upper extremity    Upper extremity location:  R index finger    Initial size (mm):  3    Final defect size (mm):  3    Malignancy: benign lesion      Destruction method: cryotherapy    Lesion 6:      I use the cryo gun to achieve freeze to the her work on the right index finger  She will continue to use compound W at home  Follow-up in 2 weeks to see if we need to treat the lesion again

## 2020-08-07 DIAGNOSIS — R31.29 MICROSCOPIC HEMATURIA: Primary | ICD-10-CM

## 2020-08-09 LAB
APPEARANCE UR: CLEAR
BILIRUB UR QL STRIP: NEGATIVE
COLOR UR: YELLOW
GLUCOSE UR QL STRIP: NEGATIVE
HGB UR QL STRIP: NEGATIVE
KETONES UR QL STRIP: NEGATIVE
LEUKOCYTE ESTERASE UR QL STRIP: NEGATIVE
NITRITE UR QL STRIP: NEGATIVE
PH UR STRIP: 6 [PH] (ref 5–8)
PROT UR QL STRIP: NEGATIVE
SP GR UR STRIP: 1.02 (ref 1–1.03)

## 2020-08-10 ENCOUNTER — OFFICE VISIT (OUTPATIENT)
Dept: UROLOGY | Facility: AMBULATORY SURGERY CENTER | Age: 55
End: 2020-08-10
Payer: COMMERCIAL

## 2020-08-10 VITALS
BODY MASS INDEX: 35 KG/M2 | HEART RATE: 72 BPM | WEIGHT: 205 LBS | HEIGHT: 64 IN | DIASTOLIC BLOOD PRESSURE: 72 MMHG | SYSTOLIC BLOOD PRESSURE: 120 MMHG | TEMPERATURE: 97.3 F

## 2020-08-10 DIAGNOSIS — R32 URINARY INCONTINENCE IN FEMALE: Primary | ICD-10-CM

## 2020-08-10 DIAGNOSIS — R31.29 MICROSCOPIC HEMATURIA: ICD-10-CM

## 2020-08-10 PROCEDURE — 1036F TOBACCO NON-USER: CPT | Performed by: NURSE PRACTITIONER

## 2020-08-10 PROCEDURE — 99213 OFFICE O/P EST LOW 20 MIN: CPT | Performed by: NURSE PRACTITIONER

## 2020-08-10 PROCEDURE — 3008F BODY MASS INDEX DOCD: CPT | Performed by: NURSE PRACTITIONER

## 2020-08-10 NOTE — PROGRESS NOTES
8/10/2020    Suha Kingston  1965  [id number]        Assessment  -Overactive bladder  -History of microscopic hematuria s/p negative hematuria workup (2014)     Ej Waterman is a 50 y o  female being managed by our office    1  Overactive bladder- we discussed her report of symptoms and medication management  Patient is not interested in an anticholinergic due to CNS side effects  She will call her insurance to ask if Myrbetriq is covered  We also discussed trospium  Patient will call our office if she is interested in a prescription  We reviewed dietary and behavioral modifications  2  History of microscopic hematuria s/p negative hematuria workup (2014)- we reviewed the results of her recent urinalysis which was negative  She will continue to obtain yearly urinalysis from her PCP  Patient would like to follow up with our office on as-needed basis  She was instructed to call episodes of gross hematuria     -All questions answered, patients agree with plan     History of Present Illness  50 y o  female with a history of OAB and microscopic hematuria presents today for follow up  Patient previously known to Dr Vishnu Sánchez  Patient underwent negative hematuria workup in 2014  She has a distant history of bladder polyp which was removed in 2009  Patient denies any episodes of gross hematuria  She had discontinued oxybutynin for increased episodes of forgetfulness  Patient continues to experience urinary frequency and urgency  She is questioning Myrbetriq, but states it is cost prohibitive for her insurance  Patient also states her grandmother had history of bladder cancer  No changes to her overall health  Review of Systems  Review of Systems   Constitutional: Negative  HENT: Negative  Respiratory: Negative  Cardiovascular: Negative  Gastrointestinal: Negative      Genitourinary: Negative for decreased urine volume, difficulty urinating, dysuria, flank pain, frequency, hematuria and urgency  Musculoskeletal: Negative  Skin: Negative  Neurological: Negative  Psychiatric/Behavioral: Negative          Past Medical History  Past Medical History:   Diagnosis Date    Depression     Incontinence     MVP (mitral valve prolapse)        Past Social History  Past Surgical History:   Procedure Laterality Date    NO PAST SURGERIES         Past Family History  Family History   Problem Relation Age of Onset    No Known Problems Father     Hyperlipidemia Mother     Cancer Paternal Grandmother         Bladder CA    Angina Paternal Grandfather        Past Social history  Social History     Socioeconomic History    Marital status: /Civil Union     Spouse name: Not on file    Number of children: Not on file    Years of education: Not on file    Highest education level: Not on file   Occupational History    Not on file   Social Needs    Financial resource strain: Not on file    Food insecurity     Worry: Not on file     Inability: Not on file    Transportation needs     Medical: Not on file     Non-medical: Not on file   Tobacco Use    Smoking status: Never Smoker    Smokeless tobacco: Never Used   Substance and Sexual Activity    Alcohol use: No    Drug use: No    Sexual activity: Not on file   Lifestyle    Physical activity     Days per week: Not on file     Minutes per session: Not on file    Stress: Not on file   Relationships    Social connections     Talks on phone: Not on file     Gets together: Not on file     Attends [employer] service: Not on file     Active member of club or organization: Not on file     Attends meetings of clubs or organizations: Not on file     Relationship status: Not on file    Intimate partner violence     Fear of current or ex partner: Not on file     Emotionally abused: Not on file     Physically abused: Not on file     Forced sexual activity: Not on file   Other Topics Concern    Not on file   Social History Narrative    Not on file       Current Medications  Current Outpatient Medications   Medication Sig Dispense Refill    clonazePAM (KlonoPIN) 0 5 mg tablet Take 1 tablet (0 5 mg total) by mouth 2 (two) times a day 60 tablet 0    escitalopram (LEXAPRO) 10 mg tablet TAKE ONE TABLET BY MOUTH DAILY 90 tablet 0    escitalopram (LEXAPRO) 5 mg tablet TAKE ONE TABLET BY MOUTH DAILY 90 tablet 0    valACYclovir (VALTREX) 500 mg tablet       metoprolol succinate (TOPROL-XL) 25 mg 24 hr tablet Take 12 5 mg by mouth        No current facility-administered medications for this visit  Allergies  No Known Allergies    Past medical history, social history, family history, medications and allergies were reviewed  Vitals  Vitals:    08/10/20 0914   BP: 120/72   Pulse: 72   Temp: (!) 97 3 °F (36 3 °C)   Weight: 93 kg (205 lb)   Height: 5' 4" (1 626 m)       Physical Exam  Physical Exam  Constitutional:       Appearance: Normal appearance  She is well-developed  HENT:      Head: Normocephalic  Eyes:      Pupils: Pupils are equal, round, and reactive to light  Neck:      Musculoskeletal: Normal range of motion  Pulmonary:      Effort: Pulmonary effort is normal    Abdominal:      Palpations: Abdomen is soft  Musculoskeletal: Normal range of motion  Skin:     General: Skin is warm  Neurological:      General: No focal deficit present  Mental Status: She is alert and oriented to person, place, and time  Psychiatric:         Mood and Affect: Mood normal          Behavior: Behavior normal          Thought Content:  Thought content normal          Judgment: Judgment normal          Results    I have personally reviewed all pertinent lab results and reviewed with patient  Lab Results   Component Value Date    GLUCOSE 79 01/31/2014    CALCIUM 9 1 09/14/2019     06/14/2016    K 4 7 09/14/2019    CO2 31 09/14/2019     09/14/2019    BUN 15 09/14/2019    CREATININE 0 83 09/14/2019     Lab Results   Component Value Date    WBC 6 7 09/14/2019    HGB 14 4 09/14/2019    HCT 43 1 09/14/2019    MCV 89 0 09/14/2019     09/14/2019     No results found for this or any previous visit (from the past 1 hour(s))

## 2020-09-18 DIAGNOSIS — F33.1 MAJOR DEPRESSIVE DISORDER, RECURRENT EPISODE, MODERATE (HCC): ICD-10-CM

## 2020-09-20 RX ORDER — ESCITALOPRAM OXALATE 10 MG/1
TABLET ORAL
Qty: 90 TABLET | Refills: 0 | Status: SHIPPED | OUTPATIENT
Start: 2020-09-20 | End: 2021-01-12

## 2020-09-20 RX ORDER — ESCITALOPRAM OXALATE 5 MG/1
TABLET ORAL
Qty: 90 TABLET | Refills: 0 | Status: SHIPPED | OUTPATIENT
Start: 2020-09-20 | End: 2021-01-12

## 2020-12-15 ENCOUNTER — TELEPHONE (OUTPATIENT)
Dept: UROLOGY | Facility: MEDICAL CENTER | Age: 55
End: 2020-12-15

## 2020-12-15 DIAGNOSIS — N32.81 OAB (OVERACTIVE BLADDER): Primary | ICD-10-CM

## 2020-12-15 RX ORDER — TROSPIUM CHLORIDE 20 MG/1
20 TABLET, FILM COATED ORAL 2 TIMES DAILY
Qty: 60 TABLET | Refills: 3 | Status: SHIPPED | OUTPATIENT
Start: 2020-12-15 | End: 2021-02-16 | Stop reason: ALTCHOICE

## 2021-01-12 DIAGNOSIS — F33.1 MAJOR DEPRESSIVE DISORDER, RECURRENT EPISODE, MODERATE (HCC): ICD-10-CM

## 2021-01-12 RX ORDER — ESCITALOPRAM OXALATE 5 MG/1
TABLET ORAL
Qty: 90 TABLET | Refills: 0 | Status: SHIPPED | OUTPATIENT
Start: 2021-01-12 | End: 2021-04-19

## 2021-01-12 RX ORDER — ESCITALOPRAM OXALATE 10 MG/1
TABLET ORAL
Qty: 90 TABLET | Refills: 0 | Status: SHIPPED | OUTPATIENT
Start: 2021-01-12 | End: 2021-04-19

## 2021-02-15 ENCOUNTER — TELEPHONE (OUTPATIENT)
Dept: UROLOGY | Facility: MEDICAL CENTER | Age: 56
End: 2021-02-15

## 2021-02-15 NOTE — TELEPHONE ENCOUNTER
Patient is to be seen by Krista Sousa in [place] tomorrow and she is ok doing a Virtual visit  She does have an I PHONE and can do Reilly Rolle  Please call her to confirm      [phone]

## 2021-02-16 ENCOUNTER — TELEMEDICINE (OUTPATIENT)
Dept: UROLOGY | Facility: AMBULATORY SURGERY CENTER | Age: 56
End: 2021-02-16
Payer: COMMERCIAL

## 2021-02-16 DIAGNOSIS — R32 URINARY INCONTINENCE IN FEMALE: Primary | ICD-10-CM

## 2021-02-16 DIAGNOSIS — R31.29 MICROSCOPIC HEMATURIA: ICD-10-CM

## 2021-02-16 PROCEDURE — 99213 OFFICE O/P EST LOW 20 MIN: CPT | Performed by: NURSE PRACTITIONER

## 2021-02-16 RX ORDER — OXYBUTYNIN CHLORIDE 10 MG/1
10 TABLET, EXTENDED RELEASE ORAL
Qty: 30 TABLET | Refills: 3 | Status: SHIPPED | OUTPATIENT
Start: 2021-02-16 | End: 2021-04-06

## 2021-02-16 NOTE — PROGRESS NOTES
Virtual Regular Visit      Assessment/Plan:    Problem List Items Addressed This Visit        Genitourinary    Microscopic hematuria       Patient is status post negative hematuria workup in 2014  Her recent urine testing showed no evidence of micro hematuria  Other    Urinary incontinence in female - Primary       Patient states she experience mild improvement with trospium, but wishes to restart oxybutynin  This was previously discontinued for concerns of CNS side effects  Prescription was electronically sent to her pharmacy  She will discontinue trospium  Instructed patient to call office with any issues  Reviewed dietary and behavioral modifications  Relevant Medications    oxybutynin (DITROPAN-XL) 10 MG 24 hr tablet        Follow-up in 6 months for re-evaluation and PVR assessment  She was instructed to call sooner with any issues  Reason for visit is   Chief Complaint   Patient presents with    Virtual Regular Visit        Encounter provider XANDER Valencia    Provider located at [address] [address]      Recent Visits  No visits were found meeting these conditions  Showing recent visits within past 7 days and meeting all other requirements     Today's Visits  Date Type Provider Dept   02/16/21 [id number] [facility] [place]   Showing today's visits and meeting all other requirements     Future Appointments  No visits were found meeting these conditions  Showing future appointments within next 150 days and meeting all other requirements        The patient was identified by name and date of birth  Vik Demarco was informed that this is a telemedicine visit and that the visit is being conducted through [address] [place] [address] and patient was informed that this is not a secure, HIPAA-compliant platform  She agrees to proceed     My office door was closed   No one else was in the room  She acknowledged consent and understanding of privacy and security of the video platform  The patient has agreed to participate and understands they can discontinue the visit at any time  Patient is aware this is a billable service  Subjective  Diamond Maya is a 50 y o  female  With a history of microscopic hematuria and overactive bladder  She is previously known to Dr Evelin Moore  Patient had previously been on oxybutynin, but discontinued due to reports of forgetfulness  Unfortunately, her insurance did not cover Myrbetriq  She was prescribed trospium for management of urinary urgency and frequency  She reports slight improvement, but feels oxybutynin was more effective  She denies any gross hematuria or dysuria  Additional history includes negative  Microscopic hematuria workup  Last urinalysis from August 2020 showed no evidence of blood RBC  Past Medical History:   Diagnosis Date    Depression     Incontinence     MVP (mitral valve prolapse)        Past Surgical History:   Procedure Laterality Date    NO PAST SURGERIES         Current Outpatient Medications   Medication Sig Dispense Refill    clonazePAM (KlonoPIN) 0 5 mg tablet Take 1 tablet (0 5 mg total) by mouth 2 (two) times a day 60 tablet 0    escitalopram (LEXAPRO) 10 mg tablet TAKE ONE TABLET BY MOUTH DAILY 90 tablet 0    escitalopram (LEXAPRO) 5 mg tablet TAKE ONE TABLET BY MOUTH DAILY 90 tablet 0    metoprolol succinate (TOPROL-XL) 25 mg 24 hr tablet Take 12 5 mg by mouth       oxybutynin (DITROPAN-XL) 10 MG 24 hr tablet Take 1 tablet (10 mg total) by mouth daily at bedtime 30 tablet 3    valACYclovir (VALTREX) 500 mg tablet        No current facility-administered medications for this visit  No Known Allergies    Review of Systems   Constitutional: Negative  HENT: Negative  Respiratory: Negative  Cardiovascular: Negative  Gastrointestinal: Negative      Genitourinary: Negative for decreased urine volume, difficulty urinating, dysuria, flank pain, frequency, hematuria and urgency  Musculoskeletal: Negative  Skin: Negative  Neurological: Negative  Psychiatric/Behavioral: Negative  Video Exam    There were no vitals filed for this visit  Physical Exam  Constitutional:       Appearance: Normal appearance  Neurological:      General: No focal deficit present  Mental Status: She is alert and oriented to person, place, and time  Psychiatric:         Mood and Affect: Mood normal          Behavior: Behavior normal          Thought Content: Thought content normal          Judgment: Judgment normal           I spent 15 minutes with patient today in which greater than 50% of the time was spent in counseling/coordination of care regarding plan of care      VIRTUAL VISIT DISCLAIMER    Diamond Maya acknowledges that she has consented to an online visit or consultation  She understands that the online visit is based solely on information provided by her, and that, in the absence of a face-to-face physical evaluation by the physician, the diagnosis she receives is both limited and provisional in terms of accuracy and completeness  This is not intended to replace a full medical face-to-face evaluation by the physician  Diamond Maya understands and accepts these terms

## 2021-02-16 NOTE — ASSESSMENT & PLAN NOTE
Patient states she experience mild improvement with trospium, but wishes to restart oxybutynin  This was previously discontinued for concerns of CNS side effects  Prescription was electronically sent to her pharmacy  She will discontinue trospium  Instructed patient to call office with any issues  Reviewed dietary and behavioral modifications

## 2021-02-16 NOTE — ASSESSMENT & PLAN NOTE
Patient is status post negative hematuria workup in 2014  Her recent urine testing showed no evidence of micro hematuria

## 2021-03-10 DIAGNOSIS — Z23 ENCOUNTER FOR IMMUNIZATION: ICD-10-CM

## 2021-03-17 ENCOUNTER — IMMUNIZATIONS (OUTPATIENT)
Dept: FAMILY MEDICINE CLINIC | Facility: HOSPITAL | Age: 56
End: 2021-03-17

## 2021-03-17 DIAGNOSIS — Z23 ENCOUNTER FOR IMMUNIZATION: Primary | ICD-10-CM

## 2021-03-17 PROCEDURE — 91300 SARS-COV-2 / COVID-19 MRNA VACCINE (PFIZER-BIONTECH) 30 MCG: CPT

## 2021-03-17 PROCEDURE — 0001A SARS-COV-2 / COVID-19 MRNA VACCINE (PFIZER-BIONTECH) 30 MCG: CPT

## 2021-04-05 ENCOUNTER — TELEPHONE (OUTPATIENT)
Dept: UROLOGY | Facility: MEDICAL CENTER | Age: 56
End: 2021-04-05

## 2021-04-05 DIAGNOSIS — N32.81 OVERACTIVE BLADDER: Primary | ICD-10-CM

## 2021-04-05 NOTE — TELEPHONE ENCOUNTER
Patient left a message on the Medication Refill voice mail line stating she is "having memory issues again" since restarting the Oxybutynin ER 10mg back in February, 2021  Message forwarded to the Advanced Practitioner covering the [facility] for further direction and/or advise

## 2021-04-06 NOTE — TELEPHONE ENCOUNTER
Call placed to patient and spoke with her  as per signed consent form  Informed him of the medication recommendations at this time  He is aware and will communicate this information to the patient

## 2021-04-06 NOTE — TELEPHONE ENCOUNTER
Patient left a message on the Medication Refill voice mail line  She is requesting "Cathy Kim" return her call to discuss further  Myrbetriq was prescribed, without speaking to her, and it'll cost over $300  This is not going to work her

## 2021-04-06 NOTE — TELEPHONE ENCOUNTER
Called patient to address change in medication , patient states that she would not be able to afford the mybetric and she would like to know what her other options are   Patient states that many med's are giving her different side effects she discussed with anna , I explained to her anna was on maternity leave   Patient requesting call from another AP or Dr   To discuss that way she does not have to come back in for same issues

## 2021-04-07 ENCOUNTER — IMMUNIZATIONS (OUTPATIENT)
Dept: FAMILY MEDICINE CLINIC | Facility: HOSPITAL | Age: 56
End: 2021-04-07

## 2021-04-07 ENCOUNTER — TELEPHONE (OUTPATIENT)
Dept: UROLOGY | Facility: MEDICAL CENTER | Age: 56
End: 2021-04-07

## 2021-04-07 DIAGNOSIS — Z23 ENCOUNTER FOR IMMUNIZATION: Primary | ICD-10-CM

## 2021-04-07 DIAGNOSIS — N32.81 OVERACTIVE BLADDER: Primary | ICD-10-CM

## 2021-04-07 PROCEDURE — 0002A SARS-COV-2 / COVID-19 MRNA VACCINE (PFIZER-BIONTECH) 30 MCG: CPT

## 2021-04-07 PROCEDURE — 91300 SARS-COV-2 / COVID-19 MRNA VACCINE (PFIZER-BIONTECH) 30 MCG: CPT

## 2021-04-07 RX ORDER — TROSPIUM CHLORIDE 20 MG/1
20 TABLET, FILM COATED ORAL 2 TIMES DAILY
Qty: 60 TABLET | Refills: 3 | Status: SHIPPED | OUTPATIENT
Start: 2021-04-07 | End: 2021-08-17 | Stop reason: SDUPTHER

## 2021-04-07 NOTE — TELEPHONE ENCOUNTER
Spoke to pt and advised of Ashtyn TERRELL's recommendations in previous note  Pt wishes to try taking trospium again for OAB  She stated it did help her alittle  Advised pt an Rx would be called in to pharmacy on file  Pt requested appt to discuss non pharmaceutical options such as Botox, PTNS and InterStim  She was given new pt appt with Ashtyn Nixon on 4/14 in [place] office  Sending email to pt per request with date of appt and names of possible OAB treatments so she can research them prior to her appt

## 2021-04-07 NOTE — TELEPHONE ENCOUNTER
Unfortunately, due to her concern or complaint of memory issues trospium and Myrbetriq, currently are the only 2 medications that should be used if she is having neuro cognitive complications secondary to anticholinergic agents  The rest of the medications will have the same or similar fact that she is currently complaining  Hence the reason why Myrbetriq was ordered since she failed therapy with trospium  There are other modalities of treatment that can be used for urinary incontinence, urgency such as Botox injections, PTNS therapy discussion of InterStim  If patient would be interested in discussing other modalities of treatment and appointment given made for that discussion to be had  Thank you

## 2021-04-19 DIAGNOSIS — F33.1 MAJOR DEPRESSIVE DISORDER, RECURRENT EPISODE, MODERATE (HCC): ICD-10-CM

## 2021-04-19 RX ORDER — ESCITALOPRAM OXALATE 10 MG/1
TABLET ORAL
Qty: 90 TABLET | Refills: 0 | Status: SHIPPED | OUTPATIENT
Start: 2021-04-19 | End: 2021-07-15 | Stop reason: SDUPTHER

## 2021-04-19 RX ORDER — ESCITALOPRAM OXALATE 5 MG/1
TABLET ORAL
Qty: 90 TABLET | Refills: 0 | Status: SHIPPED | OUTPATIENT
Start: 2021-04-19 | End: 2021-07-15 | Stop reason: SDUPTHER

## 2021-05-17 ENCOUNTER — VBI (OUTPATIENT)
Dept: ADMINISTRATIVE | Facility: OTHER | Age: 56
End: 2021-05-17

## 2021-07-25 LAB
ALBUMIN SERPL-MCNC: 4.3 G/DL (ref 3.6–5.1)
ALBUMIN/GLOB SERPL: 1.5 (CALC) (ref 1–2.5)
ALP SERPL-CCNC: 81 U/L (ref 37–153)
ALT SERPL-CCNC: 11 U/L (ref 6–29)
AST SERPL-CCNC: 10 U/L (ref 10–35)
BASOPHILS # BLD AUTO: 72 CELLS/UL (ref 0–200)
BASOPHILS NFR BLD AUTO: 1.1 %
BILIRUB SERPL-MCNC: 0.4 MG/DL (ref 0.2–1.2)
BUN SERPL-MCNC: 20 MG/DL (ref 7–25)
BUN/CREAT SERPL: ABNORMAL (CALC) (ref 6–22)
CALCIUM SERPL-MCNC: 9.2 MG/DL (ref 8.6–10.4)
CHLORIDE SERPL-SCNC: 104 MMOL/L (ref 98–110)
CHOLEST SERPL-MCNC: 240 MG/DL
CHOLEST/HDLC SERPL: 3.6 (CALC)
CO2 SERPL-SCNC: 29 MMOL/L (ref 20–32)
CREAT SERPL-MCNC: 0.75 MG/DL (ref 0.5–1.05)
EOSINOPHIL # BLD AUTO: 130 CELLS/UL (ref 15–500)
EOSINOPHIL NFR BLD AUTO: 2 %
ERYTHROCYTE [DISTWIDTH] IN BLOOD BY AUTOMATED COUNT: 13.7 % (ref 11–15)
GLOBULIN SER CALC-MCNC: 2.8 G/DL (CALC) (ref 1.9–3.7)
GLUCOSE SERPL-MCNC: 120 MG/DL (ref 65–99)
HCT VFR BLD AUTO: 40.6 % (ref 35–45)
HDLC SERPL-MCNC: 67 MG/DL
HGB BLD-MCNC: 13.3 G/DL (ref 11.7–15.5)
LDLC SERPL CALC-MCNC: 152 MG/DL (CALC)
LYMPHOCYTES # BLD AUTO: 1963 CELLS/UL (ref 850–3900)
LYMPHOCYTES NFR BLD AUTO: 30.2 %
MCH RBC QN AUTO: 29.1 PG (ref 27–33)
MCHC RBC AUTO-ENTMCNC: 32.8 G/DL (ref 32–36)
MCV RBC AUTO: 88.8 FL (ref 80–100)
MONOCYTES # BLD AUTO: 351 CELLS/UL (ref 200–950)
MONOCYTES NFR BLD AUTO: 5.4 %
NEUTROPHILS # BLD AUTO: 3985 CELLS/UL (ref 1500–7800)
NEUTROPHILS NFR BLD AUTO: 61.3 %
NONHDLC SERPL-MCNC: 173 MG/DL (CALC)
PLATELET # BLD AUTO: 252 THOUSAND/UL (ref 140–400)
PMV BLD REES-ECKER: 10.2 FL (ref 7.5–12.5)
POTASSIUM SERPL-SCNC: 4.7 MMOL/L (ref 3.5–5.3)
PROT SERPL-MCNC: 7.1 G/DL (ref 6.1–8.1)
RBC # BLD AUTO: 4.57 MILLION/UL (ref 3.8–5.1)
SL AMB EGFR AFRICAN AMERICAN: 105 ML/MIN/1.73M2
SL AMB EGFR NON AFRICAN AMERICAN: 90 ML/MIN/1.73M2
SODIUM SERPL-SCNC: 140 MMOL/L (ref 135–146)
TRIGL SERPL-MCNC: 101 MG/DL
TSH SERPL-ACNC: 2.55 MIU/L
WBC # BLD AUTO: 6.5 THOUSAND/UL (ref 3.8–10.8)

## 2021-07-26 ENCOUNTER — OFFICE VISIT (OUTPATIENT)
Dept: FAMILY MEDICINE CLINIC | Facility: CLINIC | Age: 56
End: 2021-07-26
Payer: COMMERCIAL

## 2021-07-26 VITALS
HEIGHT: 64 IN | SYSTOLIC BLOOD PRESSURE: 120 MMHG | DIASTOLIC BLOOD PRESSURE: 70 MMHG | BODY MASS INDEX: 33.8 KG/M2 | WEIGHT: 198 LBS | HEART RATE: 82 BPM

## 2021-07-26 DIAGNOSIS — E78.2 MIXED HYPERLIPIDEMIA: ICD-10-CM

## 2021-07-26 DIAGNOSIS — E66.9 OBESITY (BMI 30.0-34.9): ICD-10-CM

## 2021-07-26 DIAGNOSIS — Z00.00 ANNUAL PHYSICAL EXAM: ICD-10-CM

## 2021-07-26 DIAGNOSIS — E55.9 VITAMIN D DEFICIENCY: ICD-10-CM

## 2021-07-26 DIAGNOSIS — R73.03 PRE-DIABETES: Primary | ICD-10-CM

## 2021-07-26 DIAGNOSIS — G47.33 OSA (OBSTRUCTIVE SLEEP APNEA): ICD-10-CM

## 2021-07-26 DIAGNOSIS — F33.1 MAJOR DEPRESSIVE DISORDER, RECURRENT EPISODE, MODERATE (HCC): ICD-10-CM

## 2021-07-26 PROCEDURE — 99214 OFFICE O/P EST MOD 30 MIN: CPT | Performed by: FAMILY MEDICINE

## 2021-07-26 PROCEDURE — 1036F TOBACCO NON-USER: CPT | Performed by: FAMILY MEDICINE

## 2021-07-26 PROCEDURE — 3725F SCREEN DEPRESSION PERFORMED: CPT | Performed by: FAMILY MEDICINE

## 2021-07-26 PROCEDURE — 99396 PREV VISIT EST AGE 40-64: CPT | Performed by: FAMILY MEDICINE

## 2021-07-26 PROCEDURE — 3008F BODY MASS INDEX DOCD: CPT | Performed by: FAMILY MEDICINE

## 2021-07-26 RX ORDER — ESCITALOPRAM OXALATE 5 MG/1
5 TABLET ORAL DAILY
Qty: 90 TABLET | Refills: 1 | Status: SHIPPED | OUTPATIENT
Start: 2021-07-26 | End: 2022-07-05

## 2021-07-26 RX ORDER — ESCITALOPRAM OXALATE 10 MG/1
10 TABLET ORAL DAILY
Qty: 90 TABLET | Refills: 1 | Status: SHIPPED | OUTPATIENT
Start: 2021-07-26 | End: 2022-07-05

## 2021-07-26 NOTE — PROGRESS NOTES
Assessment/Plan:  We did review her lab work  She is going to work hard on diet exercise and weight loss  We started metformin 500 mg twice a day  Follow-up in 3-4 months check a fingerstick hemoglobin A1c  She is interested in weaning from the escitalopram   She has tried in the past without success  We discussed a weaning protocol  Diagnoses and all orders for this visit:    Pre-diabetes  -     metFORMIN (GLUCOPHAGE) 500 mg tablet; Take 1 tablet (500 mg total) by mouth 2 (two) times a day with meals    Major depressive disorder, recurrent episode, moderate (HCC)  -     escitalopram (LEXAPRO) 10 mg tablet; Take 1 tablet (10 mg total) by mouth daily  -     escitalopram (LEXAPRO) 5 mg tablet; Take 1 tablet (5 mg total) by mouth daily    Vitamin D deficiency    Obesity (BMI 30 0-34  9)    Mixed hyperlipidemia    LUCY (obstructive sleep apnea)    Annual physical exam    BMI 33 0-33 9,adult            Subjective:        Patient ID: Kelton Hernandez is a 49 y o  female  Patient presents with:  Physical Exam    He did have some lab work done recently  Also she is having trouble sleeping because her CPAP is recalled  She did try to wean from the Lexapro but had bad side effects  The following portions of the patient's history were reviewed and updated as appropriate: allergies, current medications, past family history, past medical history, past social history, past surgical history and problem list       Review of Systems   Constitutional: Negative  HENT: Negative  Eyes: Negative  Respiratory: Negative  Cardiovascular: Negative  Gastrointestinal: Negative  Endocrine: Negative  Genitourinary: Negative  Musculoskeletal: Negative  Skin: Negative  Allergic/Immunologic: Negative  Neurological: Negative  Hematological: Negative  Psychiatric/Behavioral: Negative  All other systems reviewed and are negative  Objective:      BMI Counseling:  Body mass index is 33 99 kg/m²  The BMI is above normal  Nutrition recommendations include decreasing portion sizes, encouraging healthy choices of fruits and vegetables, decreasing fast food intake, consuming healthier snacks, limiting drinks that contain sugar, moderation in carbohydrate intake, increasing intake of lean protein, reducing intake of saturated and trans fat and reducing intake of cholesterol  Exercise recommendations include moderate physical activity 150 minutes/week  No pharmacotherapy was ordered  /70 (BP Location: Right arm, Patient Position: Sitting, Cuff Size: Large)   Pulse 82   Ht 5' 4" (1 626 m)   Wt 89 8 kg (198 lb)   BMI 33 99 kg/m²          Physical Exam  Vitals and nursing note reviewed  Constitutional:       Appearance: She is well-developed  HENT:      Head: Normocephalic and atraumatic  Right Ear: External ear normal       Left Ear: External ear normal       Nose: Nose normal    Eyes:      Conjunctiva/sclera: Conjunctivae normal       Pupils: Pupils are equal, round, and reactive to light  Cardiovascular:      Rate and Rhythm: Normal rate and regular rhythm  Heart sounds: Normal heart sounds  Pulmonary:      Effort: Pulmonary effort is normal       Breath sounds: Normal breath sounds  Abdominal:      General: Bowel sounds are normal       Palpations: Abdomen is soft  Musculoskeletal:         General: Normal range of motion  Cervical back: Normal range of motion and neck supple  Skin:     General: Skin is warm and dry  Neurological:      Mental Status: She is alert and oriented to person, place, and time  Deep Tendon Reflexes: Reflexes are normal and symmetric     Psychiatric:         Behavior: Behavior normal

## 2021-07-26 NOTE — PATIENT INSTRUCTIONS
Wellness Visit for Adults   AMBULATORY CARE:   A wellness visit  is when you see your healthcare provider to get screened for health problems  Your healthcare provider will also give you advice on how to stay healthy  Write down your questions so you remember to ask them  Ask your healthcare provider how often you should have a wellness visit  What happens at a wellness visit:  Your healthcare provider will ask about your health, and your family history of health problems  This includes high blood pressure, heart disease, and cancer  He or she will ask if you have symptoms that concern you, if you smoke, and about your mood  You may also be asked about your intake of medicines, supplements, food, and alcohol  Any of the following may be done:  · Your weight  will be checked  Your height may also be checked so your body mass index (BMI) can be calculated  Your BMI shows if you are at a healthy weight  · Your blood pressure  and heart rate will be checked  Your temperature may also be checked  · Blood and urine tests  may be done  Blood tests may be done to check your cholesterol levels  Abnormal cholesterol levels increase your risk for heart disease and stroke  You may also need a blood or urine test to check for diabetes if you are at increased risk  Urine tests may be done to look for signs of an infection or kidney disease  · A physical exam  includes checking your heartbeat and lungs with a stethoscope  Your healthcare provider may also check your skin to look for sun damage  · Screening tests  may be recommended  A screening test is done to check for diseases that may not cause symptoms  The screening tests you may need depend on your age, gender, family history, and lifestyle habits  For example, colorectal screening may be recommended if you are 48years old or older  Screening tests you need if you are a woman:   · A Pap smear  is used to screen for cervical cancer   Pap smears are usually done every 3 to 5 years depending on your age  You may need them more often if you have had abnormal Pap smear test results in the past  Ask your healthcare provider how often you should have a Pap smear  · A mammogram  is an x-ray of your breasts to screen for breast cancer  Experts recommend mammograms every 2 years starting at age 48 years  You may need a mammogram at age 52 years or younger if you have an increased risk for breast cancer  Talk to your healthcare provider about when you should start having mammograms and how often you need them  Vaccines you may need:   · Get an influenza vaccine  every year  The influenza vaccine protects you from the flu  Several types of viruses cause the flu  The viruses change over time, so new vaccines are made each year  · Get a tetanus-diphtheria (Td) booster vaccine  every 10 years  This vaccine protects you against tetanus and diphtheria  Tetanus is a severe infection that may cause painful muscle spasms and lockjaw  Diphtheria is a severe bacterial infection that causes a thick covering in the back of your mouth and throat  · Get a human papillomavirus (HPV) vaccine  if you are female and aged 23 to 32 or male 23 to 24 and never received it  This vaccine protects you from HPV infection  HPV is the most common infection spread by sexual contact  HPV may also cause vaginal, penile, and anal cancers  · Get a pneumococcal vaccine  if you are aged 72 years or older  The pneumococcal vaccine is an injection given to protect you from pneumococcal disease  Pneumococcal disease is an infection caused by pneumococcal bacteria  The infection may cause pneumonia, meningitis, or an ear infection  · Get a shingles vaccine  if you are 60 or older, even if you have had shingles before  The shingles vaccine is an injection to protect you from the varicella-zoster virus  This is the same virus that causes chickenpox   Shingles is a painful rash that develops in people who had chickenpox or have been exposed to the virus  How to eat healthy:  My Plate is a model for planning healthy meals  It shows the types and amounts of foods that should go on your plate  Fruits and vegetables make up about half of your plate, and grains and protein make up the other half  A serving of dairy is included on the side of your plate  The amount of calories and serving sizes you need depends on your age, gender, weight, and height  Examples of healthy foods are listed below:  · Eat a variety of vegetables  such as dark green, red, and orange vegetables  You can also include canned vegetables low in sodium (salt) and frozen vegetables without added butter or sauces  · Eat a variety of fresh fruits , canned fruit in 100% juice, frozen fruit, and dried fruit  · Include whole grains  At least half of the grains you eat should be whole grains  Examples include whole-wheat bread, wheat pasta, brown rice, and whole-grain cereals such as oatmeal     · Eat a variety of protein foods such as seafood (fish and shellfish), lean meat, and poultry without skin (turkey and chicken)  Examples of lean meats include pork leg, shoulder, or tenderloin, and beef round, sirloin, tenderloin, and extra lean ground beef  Other protein foods include eggs and egg substitutes, beans, peas, soy products, nuts, and seeds  · Choose low-fat dairy products such as skim or 1% milk or low-fat yogurt, cheese, and cottage cheese  · Limit unhealthy fats  such as butter, hard margarine, and shortening  Exercise:  Exercise at least 30 minutes per day on most days of the week  Some examples of exercise include walking, biking, dancing, and swimming  You can also fit in more physical activity by taking the stairs instead of the elevator or parking farther away from stores  Include muscle strengthening activities 2 days each week  Regular exercise provides many health benefits   It helps you manage your weight, and decreases your risk for type 2 diabetes, heart disease, stroke, and high blood pressure  Exercise can also help improve your mood  Ask your healthcare provider about the best exercise plan for you  General health and safety guidelines:   · Do not smoke  Nicotine and other chemicals in cigarettes and cigars can cause lung damage  Ask your healthcare provider for information if you currently smoke and need help to quit  E-cigarettes or smokeless tobacco still contain nicotine  Talk to your healthcare provider before you use these products  · Limit alcohol  A drink of alcohol is 12 ounces of beer, 5 ounces of wine, or 1½ ounces of liquor  · Lose weight, if needed  Being overweight increases your risk of certain health conditions  These include heart disease, high blood pressure, type 2 diabetes, and certain types of cancer  · Protect your skin  Do not sunbathe or use tanning beds  Use sunscreen with a SPF 15 or higher  Apply sunscreen at least 15 minutes before you go outside  Reapply sunscreen every 2 hours  Wear protective clothing, hats, and sunglasses when you are outside  · Drive safely  Always wear your seatbelt  Make sure everyone in your car wears a seatbelt  A seatbelt can save your life if you are in an accident  Do not use your cell phone when you are driving  This could distract you and cause an accident  Pull over if you need to make a call or send a text message  · Practice safe sex  Use latex condoms if are sexually active and have more than one partner  Your healthcare provider may recommend screening tests for sexually transmitted infections (STIs)  · Wear helmets, lifejackets, and protective gear  Always wear a helmet when you ride a bike or motorcycle, go skiing, or play sports that could cause a head injury  Wear protective equipment when you play sports  Wear a lifejacket when you are on a boat or doing water sports      © Copyright [employer] 2021 Information is for End User's use only and may not be sold, redistributed or otherwise used for commercial purposes  All illustrations and images included in CareNotes® are the copyrighted property of [employer] M [employer]  or Nichole Barger  The above information is an  only  It is not intended as medical advice for individual conditions or treatments  Talk to your doctor, nurse or pharmacist before following any medical regimen to see if it is safe and effective for you  Weight Management   AMBULATORY CARE:   Why it is important to manage your weight:  Being overweight increases your risk of health conditions such as heart disease, high blood pressure, type 2 diabetes, and certain types of cancer  It can also increase your risk for osteoarthritis, sleep apnea, and other respiratory problems  Aim for a slow, steady weight loss  Even a small amount of weight loss can lower your risk of health problems  How to lose weight safely:  A safe and healthy way to lose weight is to eat fewer calories and get regular exercise  · You can lose up about 1 pound a week by decreasing the number of calories you eat by 500 calories each day  You can decrease calories by eating smaller portion sizes or by cutting out high-calorie foods  Read labels to find out how many calories are in the foods you eat  · You can also burn calories with exercise such as walking, swimming, or biking  You will be more likely to keep weight off if you make these changes part of your lifestyle  Exercise at least 30 minutes per day on most days of the week  You can also fit in more physical activity by taking the stairs instead of the elevator or parking farther away from stores  Ask your healthcare provider about the best exercise plan for you  Healthy meal plan for weight management:  A healthy meal plan includes a variety of foods, contains fewer calories, and helps you stay healthy   A healthy meal plan includes the following:     · Eat whole-grain foods more often  A healthy meal plan should contain fiber  Fiber is the part of grains, fruits, and vegetables that is not broken down by your body  Whole-grain foods are healthy and provide extra fiber in your diet  Some examples of whole-grain foods are whole-wheat breads and pastas, oatmeal, brown rice, and bulgur  · Eat a variety of vegetables every day  Include dark, leafy greens such as spinach, kale, isabel greens, and mustard greens  Eat yellow and orange vegetables such as carrots, sweet potatoes, and winter squash  · Eat a variety of fruits every day  Choose fresh or canned fruit (canned in its own juice or light syrup) instead of juice  Fruit juice has very little or no fiber  · Eat low-fat dairy foods  Drink fat-free (skim) milk or 1% milk  Eat fat-free yogurt and low-fat cottage cheese  Try low-fat cheeses such as mozzarella and other reduced-fat cheeses  · Choose meat and other protein foods that are low in fat  Choose beans or other legumes such as split peas or lentils  Choose fish, skinless poultry (chicken or turkey), or lean cuts of red meat (beef or pork)  Before you cook meat or poultry, cut off any visible fat  · Use less fat and oil  Try baking foods instead of frying them  Add less fat, such as margarine, sour cream, regular salad dressing and mayonnaise to foods  Eat fewer high-fat foods  Some examples of high-fat foods include french fries, doughnuts, ice cream, and cakes  · Eat fewer sweets  Limit foods and drinks that are high in sugar  This includes candy, cookies, regular soda, and sweetened drinks  Ways to decrease calories:   · Eat smaller portions  ? Use a small plate with smaller servings  ? Do not eat second helpings  ? When you eat at a restaurant, ask for a box and place half of your meal in the box before you eat  ? Share an entrée with someone else  · Replace high-calorie snacks with healthy, low-calorie snacks  ? Choose fresh fruit, vegetables, fat-free rice cakes, or air-popped popcorn instead of potato chips, nuts, or chocolate  ? Choose water or calorie-free drinks instead of soda or sweetened drinks  · Do not shop for groceries when you are hungry  You may be more likely to make unhealthy food choices  Take a grocery list of healthy foods and shop after you have eaten  · Eat regular meals  Do not skip meals  Skipping meals can lead to overeating later in the day  This can make it harder for you to lose weight  Eat a healthy snack in place of a meal if you do not have time to eat a regular meal  Talk with a dietitian to help you create a meal plan and schedule that is right for you  Other things to consider as you try to lose weight:   · Be aware of situations that may give you the urge to overeat, such as eating while watching television  Find ways to avoid these situations  For example, read a book, go for a walk, or do crafts  · Meet with a weight loss support group or friends who are also trying to lose weight  This may help you stay motivated to continue working on your weight loss goals  © Copyright [employer] 2021 Information is for End User's use only and may not be sold, redistributed or otherwise used for commercial purposes  All illustrations and images included in CareNotes® are the copyrighted property of [employer] M [employer] Inc  or Nichole Barger  The above information is an  only  It is not intended as medical advice for individual conditions or treatments  Talk to your doctor, nurse or pharmacist before following any medical regimen to see if it is safe and effective for you  Cholesterol and Your Health   AMBULATORY CARE:   Cholesterol  is a waxy, fat-like substance  Your body uses cholesterol to make hormones and new cells, and to protect nerves  Cholesterol is made by your body  It also comes from certain foods you eat, such as meat and dairy products   Your healthcare provider can help you set goals for your cholesterol levels  He or she can help you create a plan to meet your goals  Cholesterol level goals: Your cholesterol level goals depend on your risk for heart disease, your age, and your other health conditions  The following are general guidelines:  · Total cholesterol  includes low-density lipoprotein (LDL), high-density lipoprotein (HDL), and triglyceride levels  The total cholesterol level should be lower than 200 mg/dL and is best at about 150 mg/dL  · LDL cholesterol  is called bad cholesterol  because it forms plaque in your arteries  As plaque builds up, your arteries become narrow, and less blood flows through  When plaque decreases blood flow to your heart, you may have chest pain  If plaque completely blocks an artery that brings blood to your heart, you may have a heart attack  Plaque can break off and form blood clots  Blood clots may block arteries in your brain and cause a stroke  The level should be less than 130 mg/dL and is best at about 100 mg/dL  · HDL cholesterol  is called good cholesterol  because it helps remove LDL cholesterol from your arteries  It does this by attaching to LDL cholesterol and carrying it to your liver  Your liver breaks down LDL cholesterol so your body can get rid of it  High levels of HDL cholesterol can help prevent a heart attack and stroke  Low levels of HDL cholesterol can increase your risk for heart disease, heart attack, and stroke  The level should be 60 mg/dL or higher  · Triglycerides  are a type of fat that store energy from foods you eat  High levels of triglycerides also cause plaque buildup  This can increase your risk for a heart attack or stroke  If your triglyceride level is high, your LDL cholesterol level may also be high  The level should be less than 150 mg/dL      Any of the following can increase your risk for high cholesterol:   · Smoking cigarettes    · Being overweight or obese, or not getting enough exercise    · Drinking large amounts of alcohol    · A medical condition such as hypertension (high blood pressure) or diabetes    · Certain genes passed from your parents to you    · Age older than 65 years    What you need to know about having your cholesterol levels checked: Adults 21to 39years of age should have their cholesterol levels checked every 4 to 6 years  Adults 45 years or older should have their cholesterol checked every 1 to 2 years  You may need your cholesterol checked more often, or at a younger age, if you have risk factors for heart disease  You may also need to have your cholesterol checked more often if you have other health conditions, such as diabetes  Blood tests are used to check cholesterol levels  Blood tests measure your levels of triglycerides, LDL cholesterol, and HDL cholesterol  How healthy fats affect your cholesterol levels:  Healthy fats, also called unsaturated fats, help lower LDL cholesterol and triglyceride levels  Healthy fats include the following:  · Monounsaturated fats  are found in foods such as olive oil, canola oil, avocado, nuts, and olives  · Polyunsaturated fats,  such as omega 3 fats, are found in fish, such as salmon, trout, and tuna  They can also be found in plant foods such as flaxseed, walnuts, and soybeans  How unhealthy fats affect your cholesterol levels:  Unhealthy fats increase LDL cholesterol and triglyceride levels  They are found in foods high in cholesterol, saturated fat, and trans fat:  · Cholesterol  is found in eggs, dairy, and meat  · Saturated fat  is found in butter, cheese, ice cream, whole milk, and coconut oil  Saturated fat is also found in meat, such as sausage, hot dogs, and bologna  · Trans fat  is found in liquid oils and is used in fried and baked foods  Foods that contain trans fats include chips, crackers, muffins, sweet rolls, microwave popcorn, and cookies      Treatment  for high cholesterol will also decrease your risk of heart disease, heart attack, and stroke  Treatment may include any of the following:  · Lifestyle changes  may include food, exercise, weight loss, and quitting smoking  You may also need to decrease the amount of alcohol you drink  Your healthcare provider will want you to start with lifestyle changes  Other treatment may be added if lifestyle changes are not enough  Your healthcare provider may recommend you work with a team to manage hyperlipidemia  The team may include medical experts such as a dietitian, an exercise or physical therapist, and a behavior therapist  Your family members may be included in helping you create lifestyle changes  · Medicines  may be given to lower your LDL cholesterol, triglyceride levels, or total cholesterol level  You may need medicines to lower your cholesterol if any of the following is true:    ? You have a history of stroke, TIA, unstable angina, or a heart attack  ? Your LDL cholesterol level is 190 mg/dL or higher  ? You are age 36 to 76 years, have diabetes or heart disease risk factors, and your LDL cholesterol is 70 mg/dL or higher  · Supplements  include fish oil, red yeast rice, and garlic  Fish oil may help lower your triglyceride and LDL cholesterol levels  It may also increase your HDL cholesterol level  Red yeast rice may help decrease your total cholesterol level and LDL cholesterol level  Garlic may help lower your total cholesterol level  Do not take any supplements without talking to your healthcare provider  Food changes you can make to lower your cholesterol levels:  A dietitian can help you create a healthy eating plan  He or she can show you how to read food labels and choose foods low in saturated fat, trans fats, and cholesterol  · Decrease the total amount of fat you eat  Choose lean meats, fat-free or 1% fat milk, and low-fat dairy products, such as yogurt and cheese   Try to limit or avoid red meats  Limit or do not eat fried foods or baked goods, such as cookies  · Replace unhealthy fats with healthy fats  Cook foods in olive oil or canola oil  Choose soft margarines that are low in saturated fat and trans fat  Seeds, nuts, and avocados are other examples of healthy fats  · Eat foods with omega-3 fats  Examples include salmon, tuna, mackerel, walnuts, and flaxseed  Eat fish 2 times per week  Pregnant women should not eat fish that have high levels of mercury, such as shark, swordfish, and jak mackerel  · Increase the amount of high-fiber foods you eat  High-fiber foods can help lower your LDL cholesterol  Aim to get between 20 and 30 grams of fiber each day  Fruits and vegetables are high in fiber  Eat at least 5 servings each day  Other high-fiber foods are whole-grain or whole-wheat breads, pastas, or cereals, and brown rice  Eat 3 ounces of whole-grain foods each day  Increase fiber slowly  You may have abdominal discomfort, bloating, and gas if you add fiber to your diet too quickly  · Eat healthy protein foods  Examples include low-fat dairy products, skinless chicken and turkey, fish, and nuts  · Limit foods and drinks that are high in sugar  Your dietitian or healthcare provider can help you create daily limits for high-sugar foods and drinks  The limit may be lower if you have diabetes or another health condition  Limits can also help you lose weight if needed  Lifestyle changes you can make to lower your cholesterol levels:   · Maintain a healthy weight  Ask your healthcare provider what a healthy weight is for you  Ask him or her to help you create a weight loss plan if needed  Weight loss can decrease your total cholesterol and triglyceride levels  Weight loss may also help keep your blood pressure at a healthy level  · Be physically active throughout the day    Physical activity, such as exercise, can help lower your total cholesterol level and maintain a healthy weight  Physical activity can also help increase your HDL cholesterol level  Work with your healthcare provider to create an program that is right for you  Get at least 30 to 40 minutes of moderate physical activity most days of the week  Examples of exercise include brisk walking, swimming, or biking  Also include strength training at least 2 times each week  Your healthcare providers can help you create a physical activity plan  · Do not smoke  Nicotine and other chemicals in cigarettes and cigars can raise your cholesterol levels  Ask your healthcare provider for information if you currently smoke and need help to quit  E-cigarettes or smokeless tobacco still contain nicotine  Talk to your healthcare provider before you use these products  · Limit or do not drink alcohol  Alcohol can increase your triglyceride levels  Ask your healthcare provider before you drink alcohol  Ask how much is okay for you to drink in 24 hours or 1 week  Follow up with your doctor as directed:  Write down your questions so you remember to ask them during your visits  © Copyright [employer] 2021 Information is for End User's use only and may not be sold, redistributed or otherwise used for commercial purposes  All illustrations and images included in CareNotes® are the copyrighted property of [employer]  or [facility] Gaye Colin   The above information is an  only  It is not intended as medical advice for individual conditions or treatments  Talk to your doctor, nurse or pharmacist before following any medical regimen to see if it is safe and effective for you

## 2021-07-26 NOTE — PROGRESS NOTES
[address] [facility]    NAME: Radha Aguilar  AGE: 49 y o  SEX: female  : 1965     DATE: 2021     Assessment and Plan:     Problem List Items Addressed This Visit        Respiratory    LUCY (obstructive sleep apnea)       Other    Hyperlipidemia    Major depressive disorder, recurrent episode, moderate (HCC)    Relevant Medications    escitalopram (LEXAPRO) 10 mg tablet    escitalopram (LEXAPRO) 5 mg tablet    Obesity (BMI 30 0-34  9)    Vitamin D deficiency      Other Visit Diagnoses     Pre-diabetes    -  Primary    Relevant Medications    metFORMIN (GLUCOPHAGE) 500 mg tablet    Annual physical exam        BMI 33 0-33 9,adult              Immunizations and preventive care screenings were discussed with patient today  Appropriate education was printed on patient's after visit summary  Counseling:  Alcohol/drug use: discussed moderation in alcohol intake, the recommendations for healthy alcohol use, and avoidance of illicit drug use  Dental Health: discussed importance of regular tooth brushing, flossing, and dental visits  Injury prevention: discussed safety/seat belts, safety helmets, smoke detectors, carbon dioxide detectors, and smoking near bedding or upholstery  Sexual health: discussed sexually transmitted diseases, partner selection, use of condoms, avoidance of unintended pregnancy, and contraceptive alternatives  · Exercise: the importance of regular exercise/physical activity was discussed  Recommend exercise 3-5 times per week for at least 30 minutes  Return in 3 months (on 10/26/2021)       Chief Complaint:     Chief Complaint   Patient presents with    Physical Exam      History of Present Illness:     Adult Annual Physical   Patient here for a comprehensive physical exam  The patient reports problems - see list     Diet and Physical Activity  · Diet/Nutrition: well balanced diet and heart healthy (low sodium) diet  · Exercise: no formal exercise  Depression Screening  PHQ-9 Depression Screening    PHQ-9:   Frequency of the following problems over the past two weeks:      Little interest or pleasure in doing things: 0 - not at all  Feeling down, depressed, or hopeless: 0 - not at all  Trouble falling or staying asleep, or sleeping too much: 0 - not at all  Feeling tired or having little energy: 0 - not at all  Poor appetite or overeatin - not at all  Feeling bad about yourself - or that you are a failure or have let yourself or your family down: 0 - not at all  Trouble concentrating on things, such as reading the newspaper or watching television: 0 - not at all  Moving or speaking so slowly that other people could have noticed  Or the opposite - being so fidgety or restless that you have been moving around a lot more than usual: 0 - not at all  Thoughts that you would be better off dead, or of hurting yourself in some way: 0 - not at all  PHQ-2 Score: 0  PHQ-9 Score: 0       General Health  · Sleep: sleeps poorly  · Hearing: normal - bilateral   · Vision: no vision problems  · Dental: regular dental visits         /GYN Health  · Patient is: postmenopausal  · Last menstrual period: na  · Contraceptive method: na      Review of Systems:     Review of Systems   Past Medical History:     Past Medical History:   Diagnosis Date    Anxiety     Depression     Incontinence     MVP (mitral valve prolapse)       Past Surgical History:     Past Surgical History:   Procedure Laterality Date    APPENDECTOMY      DILATION AND CURETTAGE OF UTERUS      KELOID EXCISION      NO PAST SURGERIES      WISDOM TOOTH EXTRACTION        Social History:     Social History     Socioeconomic History    Marital status: /Civil Union     Spouse name: None    Number of children: None    Years of education: None    Highest education level: None   Occupational History    Occupation: working    Tobacco Use    Smoking status: Never Smoker    Smokeless tobacco: Never Used   Vaping Use    Vaping Use: Never used   Substance and Sexual Activity    Alcohol use: No    Drug use: No    Sexual activity: Yes     Partners: Male   Other Topics Concern    None   Social History Narrative    None     Social Determinants of Health     Financial Resource Strain:     Difficulty of Paying Living Expenses:    Food Insecurity:     Worried About Running Out of Food in the Last Year:     Ran Out of Food in the Last Year:    Transportation Needs:     Lack of Transportation (Medical):      Lack of Transportation (Non-Medical):    Physical Activity:     Days of Exercise per Week:     Minutes of Exercise per Session:    Stress:     Feeling of Stress :    Social Connections:     Frequency of Communication with Friends and Family:     Frequency of Social Gatherings with Friends and Family:     Attends Scientologist Services:     Active Member of Clubs or Organizations:     Attends Club or Organization Meetings:     Marital Status:    Intimate Partner Violence:     Fear of Current or Ex-Partner:     Emotionally Abused:     Physically Abused:     Sexually Abused:       Family History:     Family History   Problem Relation Age of Onset    Heart disease Father     Hyperlipidemia Mother     Glaucoma Mother     Cancer Paternal Grandmother         Bladder CA    Angina Paternal Grandfather       Current Medications:     Current Outpatient Medications   Medication Sig Dispense Refill    escitalopram (LEXAPRO) 10 mg tablet Take 1 tablet (10 mg total) by mouth daily 90 tablet 1    escitalopram (LEXAPRO) 5 mg tablet Take 1 tablet (5 mg total) by mouth daily 90 tablet 1    trospium chloride (SANCTURA) 20 mg tablet Take 1 tablet (20 mg total) by mouth 2 (two) times a day 60 tablet 3    metFORMIN (GLUCOPHAGE) 500 mg tablet Take 1 tablet (500 mg total) by mouth 2 (two) times a day with meals 180 tablet 3     No current facility-administered medications for this visit        Allergies:     No Known Allergies   Physical Exam:     /70 (BP Location: Right arm, Patient Position: Sitting, Cuff Size: Large)   Pulse 82   Ht 5' 4" (1 626 m)   Wt 89 8 kg (198 lb)   BMI 33 99 kg/m²     Physical Exam     Ally Zuluaga DO  [address]

## 2021-08-16 NOTE — PROGRESS NOTES
8/17/2021    Meghan Easley  1965  [id number]        Assessment  -History of microscopic hematuria s/p negative hematuria workup (2014)  -Urinary urge incontinence    Discussion/Plan  Debbie Corley is a 49 y o  female being managed by our office    1  Urinary urge incontinence-   PVR in the office today is 41 mL  We discussed practicing Kegel exercises for management of urinary stress and urge incontinence  Recommend referral to pelvic floor physical therapy  She will remain on trospium 20mg BID  Prescription refill was electronically sent to her pharmacy  Follow-up in 1 year for re-evaluation  She was instructed to call sooner with any issues     -All questions answered, patients agree with plan     History of Present Illness  49 y o  female with a history of urinary incontinence and microscopic hematuria presents today for follow up  Patient last evaluated via telemedicine visit in February 2021  At that time, she was switched from oxybutynin to trospium due to potential side effects  She reports prior success with Myrbetriq, but medication was cost prohibitive  She continues to experience mild urinary stress incontinence  No reports of gross hematuria or dysuria  Additional history includes negative hematuria workup in 2014  Review of Systems  Review of Systems   Constitutional: Negative  HENT: Negative  Respiratory: Negative  Cardiovascular: Negative  Gastrointestinal: Negative  Genitourinary: Positive for urgency  Negative for decreased urine volume, difficulty urinating, dysuria, flank pain, frequency and hematuria  Musculoskeletal: Negative  Skin: Negative  Neurological: Negative  Psychiatric/Behavioral: Negative          Past Medical History  Past Medical History:   Diagnosis Date    Anxiety     Depression     Incontinence     MVP (mitral valve prolapse)        Past Social History  Past Surgical History:   Procedure Laterality Date    APPENDECTOMY      DILATION AND CURETTAGE OF UTERUS      KELOID EXCISION      NO PAST SURGERIES      WISDOM TOOTH EXTRACTION         Past Family History  Family History   Problem Relation Age of Onset    Heart disease Father     Hyperlipidemia Mother     Glaucoma Mother     Cancer Paternal Grandmother         Bladder CA    Angina Paternal Grandfather        Past Social history  Social History     Socioeconomic History    Marital status: /Civil Union     Spouse name: Not on file    Number of children: Not on file    Years of education: Not on file    Highest education level: Not on file   Occupational History    Occupation: working    Tobacco Use    Smoking status: Never Smoker    Smokeless tobacco: Never Used   Vaping Use    Vaping Use: Never used   Substance and Sexual Activity    Alcohol use: No    Drug use: No    Sexual activity: Yes     Partners: Male   Other Topics Concern    Not on file   Social History Narrative    Not on file     Social Determinants of Health     Financial Resource Strain:     Difficulty of Paying Living Expenses:    Food Insecurity:     Worried About Running Out of Food in the Last Year:     [address] in the Last Year:    Transportation Needs:     Lack of Transportation (Medical):      Lack of Transportation (Non-Medical):    Physical Activity:     Days of Exercise per Week:     Minutes of Exercise per Session:    Stress:     Feeling of Stress :    Social Connections:     Frequency of Communication with Friends and Family:     Frequency of Social Gatherings with Friends and Family:     Attends Rastafari Services:     Active Member of Clubs or Organizations:     Attends Club or Organization Meetings:     Marital Status:    Intimate Partner Violence:     Fear of Current or Ex-Partner:     Emotionally Abused:     Physically Abused:     Sexually Abused:        Current Medications  Current Outpatient Medications   Medication Sig Dispense Refill    escitalopram (Mary Ellen Gonzalez) 10 mg tablet Take 1 tablet (10 mg total) by mouth daily 90 tablet 1    escitalopram (LEXAPRO) 5 mg tablet Take 1 tablet (5 mg total) by mouth daily 90 tablet 1    metFORMIN (GLUCOPHAGE) 500 mg tablet Take 1 tablet (500 mg total) by mouth 2 (two) times a day with meals 180 tablet 3    trospium chloride (SANCTURA) 20 mg tablet Take 1 tablet (20 mg total) by mouth 2 (two) times a day 60 tablet 3     No current facility-administered medications for this visit  Allergies  No Known Allergies    Past medical history, social history, family history, medications and allergies were reviewed  Vitals  There were no vitals filed for this visit  Physical Exam  Physical Exam  Constitutional:       Appearance: Normal appearance  She is well-developed  HENT:      Head: Normocephalic  Eyes:      Pupils: Pupils are equal, round, and reactive to light  Pulmonary:      Effort: Pulmonary effort is normal    Abdominal:      Palpations: Abdomen is soft  Musculoskeletal:         General: Normal range of motion  Cervical back: Normal range of motion  Skin:     General: Skin is warm and dry  Neurological:      General: No focal deficit present  Mental Status: She is alert and oriented to person, place, and time  Psychiatric:         Mood and Affect: Mood normal          Behavior: Behavior normal          Thought Content:  Thought content normal          Judgment: Judgment normal          Results    I have personally reviewed all pertinent lab results and reviewed with patient  Lab Results   Component Value Date    GLUCOSE 79 01/31/2014    CALCIUM 9 2 07/24/2021     06/14/2016    K 4 7 07/24/2021    CO2 29 07/24/2021     07/24/2021    BUN 20 07/24/2021    CREATININE 0 75 07/24/2021     Lab Results   Component Value Date    WBC 6 5 07/24/2021    HGB 13 3 07/24/2021    HCT 40 6 07/24/2021    MCV 88 8 07/24/2021     07/24/2021     No results found for this or any previous visit (from the past 1 hour(s))

## 2021-08-17 ENCOUNTER — OFFICE VISIT (OUTPATIENT)
Dept: UROLOGY | Facility: AMBULATORY SURGERY CENTER | Age: 56
End: 2021-08-17
Payer: COMMERCIAL

## 2021-08-17 VITALS
BODY MASS INDEX: 33.97 KG/M2 | SYSTOLIC BLOOD PRESSURE: 120 MMHG | HEART RATE: 80 BPM | HEIGHT: 64 IN | WEIGHT: 199 LBS | DIASTOLIC BLOOD PRESSURE: 80 MMHG

## 2021-08-17 DIAGNOSIS — N32.81 OVERACTIVE BLADDER: ICD-10-CM

## 2021-08-17 DIAGNOSIS — R32 URINARY INCONTINENCE IN FEMALE: Primary | ICD-10-CM

## 2021-08-17 LAB — POST-VOID RESIDUAL VOLUME, ML POC: 41 ML

## 2021-08-17 PROCEDURE — 51798 US URINE CAPACITY MEASURE: CPT | Performed by: NURSE PRACTITIONER

## 2021-08-17 PROCEDURE — 1036F TOBACCO NON-USER: CPT | Performed by: NURSE PRACTITIONER

## 2021-08-17 PROCEDURE — 3008F BODY MASS INDEX DOCD: CPT | Performed by: NURSE PRACTITIONER

## 2021-08-17 PROCEDURE — 99213 OFFICE O/P EST LOW 20 MIN: CPT | Performed by: NURSE PRACTITIONER

## 2021-08-17 RX ORDER — TROSPIUM CHLORIDE 20 MG/1
20 TABLET, FILM COATED ORAL 2 TIMES DAILY
Qty: 180 TABLET | Refills: 3 | Status: SHIPPED | OUTPATIENT
Start: 2021-08-17

## 2021-09-14 ENCOUNTER — VBI (OUTPATIENT)
Dept: ADMINISTRATIVE | Facility: OTHER | Age: 56
End: 2021-09-14

## 2021-10-06 ENCOUNTER — TELEPHONE (OUTPATIENT)
Dept: FAMILY MEDICINE CLINIC | Facility: CLINIC | Age: 56
End: 2021-10-06

## 2021-10-06 ENCOUNTER — OFFICE VISIT (OUTPATIENT)
Dept: FAMILY MEDICINE CLINIC | Facility: CLINIC | Age: 56
End: 2021-10-06
Payer: COMMERCIAL

## 2021-10-06 ENCOUNTER — APPOINTMENT (OUTPATIENT)
Dept: RADIOLOGY | Facility: MEDICAL CENTER | Age: 56
End: 2021-10-06
Payer: COMMERCIAL

## 2021-10-06 DIAGNOSIS — J20.8 ACUTE BRONCHITIS DUE TO OTHER SPECIFIED ORGANISMS: ICD-10-CM

## 2021-10-06 DIAGNOSIS — J20.8 ACUTE BRONCHITIS DUE TO OTHER SPECIFIED ORGANISMS: Primary | ICD-10-CM

## 2021-10-06 PROCEDURE — 71046 X-RAY EXAM CHEST 2 VIEWS: CPT

## 2021-10-06 PROCEDURE — 99213 OFFICE O/P EST LOW 20 MIN: CPT | Performed by: FAMILY MEDICINE

## 2021-10-06 RX ORDER — AMOXICILLIN 500 MG/1
500 TABLET, FILM COATED ORAL 3 TIMES DAILY
Qty: 21 TABLET | Refills: 0 | Status: SHIPPED | OUTPATIENT
Start: 2021-10-06 | End: 2021-10-13

## 2021-10-06 RX ORDER — PREDNISONE 20 MG/1
40 TABLET ORAL DAILY
Qty: 10 TABLET | Refills: 0 | Status: SHIPPED | OUTPATIENT
Start: 2021-10-06 | End: 2021-10-11

## 2021-10-18 ENCOUNTER — RA CDI HCC (OUTPATIENT)
Dept: OTHER | Facility: HOSPITAL | Age: 56
End: 2021-10-18

## 2021-10-26 ENCOUNTER — OFFICE VISIT (OUTPATIENT)
Dept: FAMILY MEDICINE CLINIC | Facility: CLINIC | Age: 56
End: 2021-10-26
Payer: COMMERCIAL

## 2021-10-26 VITALS
WEIGHT: 196 LBS | OXYGEN SATURATION: 98 % | SYSTOLIC BLOOD PRESSURE: 120 MMHG | BODY MASS INDEX: 33.46 KG/M2 | HEIGHT: 64 IN | DIASTOLIC BLOOD PRESSURE: 84 MMHG | HEART RATE: 88 BPM | TEMPERATURE: 96.6 F

## 2021-10-26 DIAGNOSIS — R05.9 COUGH: ICD-10-CM

## 2021-10-26 DIAGNOSIS — J45.30 MILD PERSISTENT REACTIVE AIRWAY DISEASE WITHOUT COMPLICATION: Primary | ICD-10-CM

## 2021-10-26 DIAGNOSIS — Z23 NEED FOR IMMUNIZATION AGAINST INFLUENZA: ICD-10-CM

## 2021-10-26 PROCEDURE — 1036F TOBACCO NON-USER: CPT | Performed by: FAMILY MEDICINE

## 2021-10-26 PROCEDURE — 3008F BODY MASS INDEX DOCD: CPT | Performed by: FAMILY MEDICINE

## 2021-10-26 PROCEDURE — 90682 RIV4 VACC RECOMBINANT DNA IM: CPT | Performed by: FAMILY MEDICINE

## 2021-10-26 PROCEDURE — 99214 OFFICE O/P EST MOD 30 MIN: CPT | Performed by: FAMILY MEDICINE

## 2021-10-26 PROCEDURE — 90471 IMMUNIZATION ADMIN: CPT | Performed by: FAMILY MEDICINE

## 2021-10-26 RX ORDER — BENZONATATE 200 MG/1
200 CAPSULE ORAL 3 TIMES DAILY PRN
Qty: 30 CAPSULE | Refills: 2 | Status: SHIPPED | OUTPATIENT
Start: 2021-10-26 | End: 2022-01-19

## 2021-10-26 RX ORDER — DEXAMETHASONE 4 MG/1
2 TABLET ORAL 2 TIMES DAILY
Qty: 12 G | Refills: 3 | Status: SHIPPED | OUTPATIENT
Start: 2021-10-26 | End: 2022-01-19

## 2021-11-11 ENCOUNTER — TELEPHONE (OUTPATIENT)
Dept: GASTROENTEROLOGY | Facility: CLINIC | Age: 56
End: 2021-11-11

## 2021-11-16 ENCOUNTER — PREP FOR PROCEDURE (OUTPATIENT)
Dept: GASTROENTEROLOGY | Facility: CLINIC | Age: 56
End: 2021-11-16

## 2021-11-16 ENCOUNTER — VBI (OUTPATIENT)
Dept: ADMINISTRATIVE | Facility: OTHER | Age: 56
End: 2021-11-16

## 2021-11-16 DIAGNOSIS — Z86.010 HISTORY OF COLON POLYPS: Primary | ICD-10-CM

## 2022-01-19 ENCOUNTER — ANESTHESIA (OUTPATIENT)
Dept: GASTROENTEROLOGY | Facility: AMBULATORY SURGERY CENTER | Age: 57
End: 2022-01-19

## 2022-01-19 ENCOUNTER — ANESTHESIA EVENT (OUTPATIENT)
Dept: GASTROENTEROLOGY | Facility: AMBULATORY SURGERY CENTER | Age: 57
End: 2022-01-19

## 2022-01-19 ENCOUNTER — HOSPITAL ENCOUNTER (OUTPATIENT)
Dept: GASTROENTEROLOGY | Facility: AMBULATORY SURGERY CENTER | Age: 57
Discharge: HOME | End: 2022-01-19
Payer: COMMERCIAL

## 2022-01-19 VITALS
BODY MASS INDEX: 33.8 KG/M2 | HEIGHT: 64 IN | HEART RATE: 70 BPM | OXYGEN SATURATION: 99 % | SYSTOLIC BLOOD PRESSURE: 120 MMHG | TEMPERATURE: 96.3 F | WEIGHT: 198 LBS | RESPIRATION RATE: 18 BRPM | DIASTOLIC BLOOD PRESSURE: 73 MMHG

## 2022-01-19 DIAGNOSIS — Z86.010 HISTORY OF COLON POLYPS: ICD-10-CM

## 2022-01-19 PROBLEM — K63.5 HYPERPLASTIC COLONIC POLYP: Status: ACTIVE | Noted: 2022-01-19

## 2022-01-19 LAB
EXT PREGNANCY TEST URINE: NEGATIVE
EXT. CONTROL: NORMAL

## 2022-01-19 PROCEDURE — 88305 TISSUE EXAM BY PATHOLOGIST: CPT | Performed by: PATHOLOGY

## 2022-01-19 PROCEDURE — 45380 COLONOSCOPY AND BIOPSY: CPT | Performed by: INTERNAL MEDICINE

## 2022-01-19 PROCEDURE — 00811 ANES LWR INTST NDSC NOS: CPT | Performed by: NURSE ANESTHETIST, CERTIFIED REGISTERED

## 2022-01-19 RX ORDER — PROPOFOL 10 MG/ML
INJECTION, EMULSION INTRAVENOUS AS NEEDED
Status: DISCONTINUED | OUTPATIENT
Start: 2022-01-19 | End: 2022-01-19

## 2022-01-19 RX ORDER — SODIUM CHLORIDE 9 MG/ML
30 INJECTION, SOLUTION INTRAVENOUS CONTINUOUS
Status: DISCONTINUED | OUTPATIENT
Start: 2022-01-19 | End: 2022-01-23 | Stop reason: HOSPADM

## 2022-01-19 RX ORDER — SODIUM CHLORIDE 9 MG/ML
20 INJECTION, SOLUTION INTRAVENOUS CONTINUOUS
Status: DISCONTINUED | OUTPATIENT
Start: 2022-01-19 | End: 2022-01-23 | Stop reason: HOSPADM

## 2022-01-19 RX ORDER — LIDOCAINE HYDROCHLORIDE 10 MG/ML
INJECTION, SOLUTION EPIDURAL; INFILTRATION; INTRACAUDAL; PERINEURAL AS NEEDED
Status: DISCONTINUED | OUTPATIENT
Start: 2022-01-19 | End: 2022-01-19

## 2022-01-19 RX ADMIN — PROPOFOL 130 MG: 10 INJECTION, EMULSION INTRAVENOUS at 10:53

## 2022-01-19 RX ADMIN — PROPOFOL 50 MG: 10 INJECTION, EMULSION INTRAVENOUS at 10:59

## 2022-01-19 RX ADMIN — PROPOFOL 50 MG: 10 INJECTION, EMULSION INTRAVENOUS at 11:04

## 2022-01-19 RX ADMIN — PROPOFOL 50 MG: 10 INJECTION, EMULSION INTRAVENOUS at 11:07

## 2022-01-19 RX ADMIN — PROPOFOL 20 MG: 10 INJECTION, EMULSION INTRAVENOUS at 11:03

## 2022-01-19 RX ADMIN — PROPOFOL 50 MG: 10 INJECTION, EMULSION INTRAVENOUS at 11:10

## 2022-01-19 RX ADMIN — PROPOFOL 30 MG: 10 INJECTION, EMULSION INTRAVENOUS at 11:00

## 2022-01-19 RX ADMIN — PROPOFOL 20 MG: 10 INJECTION, EMULSION INTRAVENOUS at 10:55

## 2022-01-19 RX ADMIN — LIDOCAINE HYDROCHLORIDE 50 MG: 10 INJECTION, SOLUTION EPIDURAL; INFILTRATION; INTRACAUDAL; PERINEURAL at 10:53

## 2022-01-19 RX ADMIN — SODIUM CHLORIDE: 9 INJECTION, SOLUTION INTRAVENOUS at 10:48

## 2022-01-19 NOTE — DISCHARGE INSTRUCTIONS
Colonoscopy   WHAT YOU NEED TO KNOW:   A colonoscopy is a procedure to examine the inside of your colon (intestine) with a scope  Polyps or tissue growths may have been removed during your colonoscopy  It is normal to feel bloated and to have some abdominal discomfort  You should be passing gas  If you have hemorrhoids or you had polyps removed, you may have a small amount of bleeding  DISCHARGE INSTRUCTIONS:   Seek care immediately if:    You have sudden, severe abdominal pain   You have problems swallowing   You have a large amount of black, sticky bowel movements or blood in your bowel movements   You have sudden trouble breathing   You feel weak, lightheaded, or faint or your heart beats faster than normal for you  Contact your healthcare provider if:    You have a fever and chills   You have nausea or are vomiting   Your abdomen is bloated or feels full and hard   You have abdominal pain   You have black, sticky bowel movements or blood in your bowel movements   You have not had a bowel movement for 3 days after your procedure   You have rash or hives   You have questions or concerns about your procedure  Activity:    Do not lift, strain, or run for 24 hours after your procedure   Rest after your procedure  You have been given medicine to relax you  Do not drive or make important decisions until the day after your procedure  Return to your normal activity as directed   Relieve gas and discomfort from bloating by lying on your right side with a heating pad on your abdomen  You may need to take short walks to help the gas move out  Eat small meals until bloating is relieved  Follow up with your healthcare provider as directed: Write down your questions so you remember to ask them during your visits  If you take a blood thinner, please review the specific instructions from your endoscopist about when you should resume it   These can be found in the Recommendation and Your Medication list sections of this After Visit Summary  Colorectal Polyps   WHAT YOU NEED TO KNOW:   Colorectal polyps are small growths of tissue in the lining of the colon and rectum  Most polyps are hyperplastic polyps and are usually benign (noncancerous)  Certain types of polyps, called adenomatous polyps, may turn into cancer  DISCHARGE INSTRUCTIONS:   Follow up with your healthcare provider or gastroenterologist as directed: You may need to return for more tests, such as another colonoscopy  Write down your questions so you remember to ask them during your visits  Reduce your risk for colorectal polyps:   · Eat a variety of healthy foods:  Healthy foods include fruit, vegetables, whole-grain breads, low-fat dairy products, beans, lean meat, and fish  Ask if you need to be on a special diet  · Maintain a healthy weight:  Ask your healthcare provider if you need to lose weight and how much you need to lose  Ask for help with a weight loss program     · Exercise:  Begin to exercise slowly and do more as you get stronger  Talk with your healthcare provider before you start an exercise program      · Limit alcohol:  Your risk for polyps increases the more you drink  · Do not smoke: If you smoke, it is never too late to quit  Ask for information about how to stop  For support and more information:   · [address] ([facility]) [address], [place] [address]  Phone: 4- 192 [phone]  Web Address: www digestive  niddk nih gov    Contact your healthcare provider or gastroenterologist if:   · You have a fever  · You have chills, a cough, or feel weak and achy  · You have abdominal pain that does not go away or gets worse after you take medicine  · Your abdomen is swollen  · You are losing weight without trying  · You have questions or concerns about your condition or care      Seek care immediately or call 911 if:   · You have sudden shortness of breath  · You have a fast heart rate, fast breathing, or are too dizzy to stand up  · You have severe abdominal pain  · You see blood in your bowel movement  © Copyright [address] Information is for End User's use only and may not be sold, redistributed or otherwise used for commercial purposes  All illustrations and images included in CareNotes® are the copyrighted property of A D A M , Inc  or [facility] Gaye Colin   The above information is an  only  It is not intended as medical advice for individual conditions or treatments  Talk to your doctor, nurse or pharmacist before following any medical regimen to see if it is safe and effective for you  Diverticulosis   WHAT YOU NEED TO KNOW:   What is diverticulosis? Diverticulosis is a condition that causes small pockets called diverticula to form in your intestine  These pockets make it difficult for bowel movements to pass through your digestive system  What causes diverticulosis? Diverticula form when muscles have to work hard to move bowel movements through the intestine  The force causes bulges to form at weak areas in the intestine  This may happen if you eat foods that are low in fiber  Fiber helps give your bowel movements more bulk so they are larger and easier to move through your colon  The following may increase your risk of diverticulosis:  · A history of constipation    · Age 36 or older    · Obesity    · Lack of exercise    What are the signs and symptoms of diverticulosis? Diverticulosis usually does not cause any signs or symptoms  It may cause any of the following in some people:  · Pain or discomfort in your lower abdomen    · Abdominal bloating    · Constipation or diarrhea    How is diverticulosis diagnosed? Your healthcare provider will examine you and ask about your bowel movements, diet, and symptoms   He or she will also ask about any medical conditions you have or medicines you take  You may need any of the following:  · Blood tests  may be done to check for signs of inflammation  · A barium enema  is an x-ray of your colon that may show diverticula  A tube is put into your anus, and a liquid called barium is put through the tube  Barium is used so that healthcare providers can see your colon more clearly  · Flexible sigmoidoscopy  is a test to look for any changes in your lower intestines and rectum  It may also show the cause of any bleeding or pain  A soft, bendable tube with a light on the end will be put into your anus  It will then be moved forward into your intestine  · A colonoscopy  is used to look at your whole colon  A scope (long bendable tube with a light on the end) is used to take pictures  This test may show diverticula  · A CT scan , or CAT scan, may show diverticula  You may be given contrast liquid before the scan  Tell the healthcare provider if you have ever had an allergic reaction to contrast liquid  How is diverticulosis managed? The goal of treatment is to manage any symptoms you have and prevent other problems such as diverticulitis  Diverticulitis is swelling or infection of the diverticula  Your healthcare provider may recommend any of the following:  · Eat a variety of high-fiber foods  High-fiber foods help you have regular bowel movements  High-fiber foods include cooked beans, fruits, vegetables, and some cereals  Most adults need 25 to 35 grams of fiber each day  Your healthcare provider may recommend that you have more  Ask your healthcare provider how much fiber you need  Increase fiber slowly  You may have abdominal discomfort, bloating, and gas if you add fiber to your diet too quickly  You may need to take a fiber supplement if you are not getting enough fiber from food  · Medicines  to soften your bowel movements may be given   You may also need medicines to treat symptoms such as bloating and pain  · Drink liquids as directed  You may need to drink 2 to 3 liters (8 to 12 cups) of liquids every day  Ask your healthcare provider how much liquid to drink each day and which liquids are best for you  · Apply heat  on your abdomen for 20 to 30 minutes every 2 hours for as many days as directed  Heat helps decrease pain and muscle spasms  How can I help prevent diverticulitis or other symptoms? The following may help decrease your risk for diverticulitis or symptoms, such as bleeding  Talk to your provider about these or other things you can do to prevent problems that may occur with diverticulosis  · Exercise regularly  Ask your healthcare provider about the best exercise plan for you  Exercise can help you have regular bowel movements  Get 30 minutes of exercise on most days of the week  · Maintain a healthy weight  Ask your healthcare provider what a healthy weight is for you  Ask him or her to help you create a weight loss plan if you are overweight  · Do not smoke  Nicotine and other chemicals in cigarettes increase your risk for diverticulitis  Ask your healthcare provider for information if you currently smoke and need help to quit  E-cigarettes or smokeless tobacco still contain nicotine  Talk to your healthcare provider before you use these products  · Ask your healthcare provider if it is safe to take NSAIDs  NSAIDs may increase your risk of diverticulitis  When should I seek immediate care? · You have severe pain on the left side of your lower abdomen  · Your bowel movements are bright or dark red  When should I call my doctor? · You have a fever and chills  · You feel dizzy or lightheaded  · You have nausea, or you are vomiting  · You have a change in your bowel movements  · You have questions or concerns about your condition or care  CARE AGREEMENT:   You have the right to help plan your care   Learn about your health condition and how it may be treated  Discuss treatment options with your healthcare providers to decide what care you want to receive  You always have the right to refuse treatment  The above information is an  only  It is not intended as medical advice for individual conditions or treatments  Talk to your doctor, nurse or pharmacist before following any medical regimen to see if it is safe and effective for you  © Copyright [employer] 2021 Information is for End User's use only and may not be sold, redistributed or otherwise used for commercial purposes  All illustrations and images included in CareNotes® are the copyrighted property of A [employer]  or [facility] Gaye Colin   Hemorrhoids   WHAT YOU NEED TO KNOW:   What are hemorrhoids? Hemorrhoids are swollen blood vessels inside your rectum (internal hemorrhoids) or on your anus (external hemorrhoids)  Sometimes a hemorrhoid may prolapse  This means it extends out of your anus  What increases my risk for hemorrhoids? · Pregnancy or obesity    · Straining or sitting for a long time during bowel movements    · Liver disease    · Weak muscles around the anus caused by older age, rectal surgery, or anal intercourse    · A lack of physical activity    · Chronic diarrhea or constipation    · A low-fiber diet    What are the signs and symptoms of hemorrhoids? · Pain or itching around your anus or inside your rectum    · Swelling or bumps around your anus    · Bright red blood in your bowel movement, on the toilet paper, or in the toilet bowl    · Tissue bulging out of your anus (prolapsed hemorrhoids)    · Incontinence (poor control over urine or bowel movements)    How are hemorrhoids diagnosed? Your healthcare provider will ask about your symptoms, the foods you eat, and your bowel movements  He or she will examine your anus for external hemorrhoids  You may need the following:  · A digital rectal exam  is a test to check for hemorrhoids   Your healthcare provider will put a gloved finger inside your anus to feel for the hemorrhoids  · An anoscopy  is a test that uses a scope (small tube with a light and camera on the end) to look at your hemorrhoids  How are hemorrhoids treated? Treatment will depend on your symptoms  You may need any of the following:  · Medicines  can help decrease pain and swelling, and soften your bowel movement  The medicine may be a pill, pad, cream, or ointment  · Procedures  may be used to shrink or remove your hemorrhoid  Examples include rubber-band ligation, sclerotherapy, and photocoagulation  These procedures may be done in your healthcare provider's office  Ask your healthcare provider for more information about these procedures  · Surgery  may be needed to shrink or remove your hemorrhoids  How can I manage my symptoms? · Apply ice on your anus for 15 to 20 minutes every hour or as directed  Use an ice pack, or put crushed ice in a plastic bag  Cover it with a towel before you apply it to your anus  Ice helps prevent tissue damage and decreases swelling and pain  · Take a sitz bath  Fill a bathtub with 4 to 6 inches of warm water  You may also use a sitz bath pan that fits inside a toilet bowl  Sit in the sitz bath for 15 minutes  Do this 3 times a day, and after each bowel movement  The warm water can help decrease pain and swelling  · Keep your anal area clean  Gently wash the area with warm water daily  Soap may irritate the area  After a bowel movement, wipe with moist towelettes or wet toilet paper  Dry toilet paper can irritate the area  How can I help prevent hemorrhoids? · Do not strain to have a bowel movement  Do not sit on the toilet too long  These actions can increase pressure on the tissues in your rectum and anus  · Drink plenty of liquids  Liquids can help prevent constipation  Ask how much liquid to drink each day and which liquids are best for you       · Eat a variety of high-fiber foods  Examples include fruits, vegetables, and whole grains  Ask your healthcare provider how much fiber you need each day  You may need to take a fiber supplement  · Exercise as directed  Exercise, such as walking, may make it easier to have a bowel movement  Ask your healthcare provider to help you create an exercise plan  · Do not have anal sex  Anal sex can weaken the skin around your rectum and anus  · Avoid heavy lifting  This can cause straining and increase your risk for another hemorrhoid  When should I seek immediate care? · You have severe pain in your rectum or around your anus  · You have severe pain in your abdomen and you are vomiting  · You have bleeding from your anus that soaks through your underwear  When should I contact my healthcare provider? · You have frequent and painful bowel movements  · Your hemorrhoid looks or feels more swollen than usual      · You do not have a bowel movement for 2 days or more  · You see or feel tissue coming through your anus  · You have questions or concerns about your condition or care  CARE AGREEMENT:   You have the right to help plan your care  Learn about your health condition and how it may be treated  Discuss treatment options with your healthcare providers to decide what care you want to receive  You always have the right to refuse treatment  The above information is an  only  It is not intended as medical advice for individual conditions or treatments  Talk to your doctor, nurse or pharmacist before following any medical regimen to see if it is safe and effective for you  © Copyright [employer] 2021 Information is for End User's use only and may not be sold, redistributed or otherwise used for commercial purposes   All illustrations and images included in CareNotes® are the copyrighted property of [employer] , [employer]  or [facility] Fiber Diet   WHAT YOU NEED TO KNOW: What is a high-fiber diet? A high-fiber diet includes foods that have a high amount of fiber  Fiber is the part of fruits, vegetables, and grains that is not broken down by your body  Fiber keeps your bowel movements regular  Fiber can also help lower your cholesterol level, control blood sugar in people with diabetes, and relieve constipation  Fiber can also help you control your weight because it helps you feel full faster  Most adults should eat 25 to 35 grams of fiber each day  Talk to your dietitian or healthcare provider about the amount of fiber you need  What foods are good sources of fiber? · Foods with at least 4 grams of fiber per serving:      ? ? to ½ cup of high-fiber cereal (check the nutrition label on the box)    ? ½ cup of blackberries or raspberries    ? 4 dried prunes    ? 1 cooked artichoke    ? ½ cup of cooked legumes, such as lentils, or red, kidney, and elizondo beans    · Foods with 1 to 3 grams of fiber per serving:      ? 1 slice of whole-wheat, pumpernickel, or rye bread    ? ½ cup of cooked brown rice    ? 4 whole-wheat crackers    ? 1 cup of oatmeal    ? ½ cup of cereal with 1 to 3 grams of fiber per serving (check the nutrition label on the box)    ? 1 small piece of fruit, such as an apple, banana, pear, kiwi, or orange    ? 3 dates    ? ½ cup of canned apricots, fruit cocktail, peaches, or pears    ? ½ cup of raw or cooked vegetables, such as carrots, cauliflower, cabbage, spinach, squash, or corn    What are some ways that I can increase fiber in my diet? · Choose brown or wild rice instead of white rice  · Use whole wheat flour in recipes instead of white or all-purpose flour  · Add beans and peas to casseroles or soups  · Choose fresh fruit and vegetables with peels or skins on instead of juices  What other guidelines should I follow? · Add fiber to your diet slowly    You may have abdominal discomfort, bloating, and gas if you add fiber to your diet too quickly  · Drink plenty of liquids as you add fiber to your diet  You may have nausea or develop constipation if you do not drink enough water  Ask how much liquid to drink each day and which liquids are best for you  CARE AGREEMENT:   You have the right to help plan your care  Discuss treatment options with your healthcare provider to decide what care you want to receive  You always have the right to refuse treatment  The above information is an  only  It is not intended as medical advice for individual conditions or treatments  Talk to your doctor, nurse or pharmacist before following any medical regimen to see if it is safe and effective for you  © Copyright [employer] 2021 Information is for End User's use only and may not be sold, redistributed or otherwise used for commercial purposes   All illustrations and images included in CareNotes® are the copyrighted property of [employer]  or [address] [place] [address]

## 2022-01-19 NOTE — H&P
History and Physical - [facility]  Anna Aragon 49 y o  female MRN: [id number]                  HPI: Anna Aragon is a 49y o  year old female who presents for colonoscopy due to history of colon polyps      REVIEW OF SYSTEMS: Per the HPI, and otherwise unremarkable      Historical Information   Past Medical History:   Diagnosis Date    Anxiety     Depression     Incontinence     MVP (mitral valve prolapse)     Sleep apnea     mild to moderate, had cpap device but was recalled, not replaced yet     Past Surgical History:   Procedure Laterality Date    APPENDECTOMY      COLONOSCOPY      DILATION AND CURETTAGE OF UTERUS      HAND SURGERY      KELOID EXCISION      KELOID EXCISION Right     arm    NO PAST SURGERIES      WISDOM TOOTH EXTRACTION       Social History   Social History     Substance and Sexual Activity   Alcohol Use Yes    Comment: rare socially     Social History     Substance and Sexual Activity   Drug Use No     Social History     Tobacco Use   Smoking Status Never Smoker   Smokeless Tobacco Never Used     Family History   Problem Relation Age of Onset    Heart disease Father     Hyperlipidemia Mother     Glaucoma Mother     Cancer Paternal Grandmother         Bladder CA    Angina Paternal Grandfather        Meds/Allergies       Current Outpatient Medications:     escitalopram (LEXAPRO) 10 mg tablet    escitalopram (LEXAPRO) 5 mg tablet    trospium chloride (SANCTURA) 20 mg tablet    Current Facility-Administered Medications:     sodium chloride 0 9 % infusion, 30 mL/hr, Intravenous, Continuous, New Bag at 01/19/22 1048    No Known Allergies    Objective     /79   Pulse 79   Temp (!) 96 3 °F (35 7 °C) (Skin)   Resp 18   Ht 5' 4" (1 626 m)   Wt 89 8 kg (198 lb)   LMP 11/19/2021 (Approximate)   SpO2 96%   BMI 33 99 kg/m²       PHYSICAL EXAM    Gen: NAD  Head: NCAT  CV: RRR  CHEST: Clear  ABD: soft, NT/ND  EXT: no edema      ASSESSMENT/PLAN:  This is a 49y o  year old female here for colonoscopy, and she is stable and optimized for her procedure

## 2022-01-19 NOTE — ANESTHESIA PREPROCEDURE EVALUATION
Procedure:  COLONOSCOPY    Relevant Problems   ANESTHESIA (within normal limits)      CARDIO   (+) Asymptomatic PVCs   (+) Hyperlipidemia   (+) MVP (mitral valve prolapse)      ENDO (within normal limits)      GI/HEPATIC (within normal limits)      /RENAL (within normal limits)      GYN (within normal limits)      HEMATOLOGY (within normal limits)      MUSCULOSKELETAL   (+) Sciatica      NEURO/PSYCH   (+) Depression   (+) Major depressive disorder, recurrent episode, moderate (HCC)   (+) Panic disorder      PULMONARY   (+) Sleep apnea      Other   (+) Obesity (BMI 30 0-34  9)        Physical Exam    Airway    Mallampati score: II  TM Distance: >3 FB  Neck ROM: full     Dental   No notable dental hx     Cardiovascular  Cardiovascular exam normal    Pulmonary  Pulmonary exam normal     Other Findings  Patient does wear cpap since the recall      Anesthesia Plan  ASA Score- 3     Anesthesia Type- IV sedation with anesthesia with ASA Monitors  Additional Monitors:   Airway Plan:           Plan Factors-Exercise tolerance (METS): >4 METS  Chart reviewed  There is medical exclusion for perioperative obstructive sleep apnea risk education  Induction- intravenous  Postoperative Plan-     Informed Consent- Anesthetic plan and risks discussed with patient

## 2022-01-19 NOTE — ANESTHESIA POSTPROCEDURE EVALUATION
Post-Op Assessment Note    CV Status:  Stable  Pain Score: 0    Pain management: adequate     Mental Status:  Alert and awake   Hydration Status:  Stable   PONV Controlled:  None   Airway Patency:  Patent   Two or more mitigation strategies used for obstructive sleep apnea   Post Op Vitals Reviewed: Yes      Staff: CRNA         No complications documented      BP 97/53 (01/19/22 1125)    Temp     Pulse 73 (01/19/22 1125)   Resp 18 (01/19/22 1125)    SpO2 95 % (01/19/22 1125)

## 2022-01-26 NOTE — RESULT ENCOUNTER NOTE
Please inform patient that their biopsies were benign  Repeat colonoscopy 3 years    She had sessile serrated adenomas

## 2022-02-24 ENCOUNTER — TELEPHONE (OUTPATIENT)
Dept: FAMILY MEDICINE CLINIC | Facility: CLINIC | Age: 57
End: 2022-02-24

## 2022-02-24 DIAGNOSIS — F41.9 ANXIETY: Primary | ICD-10-CM

## 2022-02-24 RX ORDER — CLONAZEPAM 0.5 MG/1
0.5 TABLET ORAL DAILY PRN
Qty: 30 TABLET | Refills: 0 | Status: SHIPPED | OUTPATIENT
Start: 2022-02-24

## 2022-02-24 NOTE — TELEPHONE ENCOUNTER
Pt wants an Rx for her Clonazepam  5 She stated that she only takes it prn and she needs a refill now    Last one was refilled about 4 yrs ago

## 2022-04-15 DIAGNOSIS — Z12.31 ENCOUNTER FOR SCREENING MAMMOGRAM FOR MALIGNANT NEOPLASM OF BREAST: Primary | ICD-10-CM

## 2022-05-17 ENCOUNTER — TELEPHONE (OUTPATIENT)
Dept: SLEEP CENTER | Facility: CLINIC | Age: 57
End: 2022-05-17

## 2022-05-17 NOTE — TELEPHONE ENCOUNTER
Courtney Resendez will bring in the cpap he rec'd from the Resp recall tomorrow @ 10am to have the pressure set correctly   She blanco not have a modem

## 2022-05-17 NOTE — TELEPHONE ENCOUNTER
I returned Chela's call re: recall replacement received, but pressure incorrect  I explained how to have this corrected   If switching the modem and waiting approx 24 hrs does not work, call back

## 2022-05-18 ENCOUNTER — TELEPHONE (OUTPATIENT)
Dept: SLEEP CENTER | Facility: CLINIC | Age: 57
End: 2022-05-18

## 2022-05-18 NOTE — TELEPHONE ENCOUNTER
Pt  brought in her Dreamstation replacement from [employer] and I set the pressure to 9cm b/c she claims to have never had a modem  I advised her Asael will not send her one if she did not have one and we no longer have them  I could not give her supplies b/c she did not have a valid Rx on file and she has not seen a dr  in 2 years  She does not want to see a dr and will most likely buy her supplies off [address] [facility] [address]

## 2022-07-05 DIAGNOSIS — F33.1 MAJOR DEPRESSIVE DISORDER, RECURRENT EPISODE, MODERATE (HCC): ICD-10-CM

## 2022-07-05 RX ORDER — ESCITALOPRAM OXALATE 10 MG/1
10 TABLET ORAL DAILY
Qty: 90 TABLET | Refills: 0 | Status: SHIPPED | OUTPATIENT
Start: 2022-07-05 | End: 2022-07-07 | Stop reason: SDUPTHER

## 2022-07-05 RX ORDER — ESCITALOPRAM OXALATE 5 MG/1
5 TABLET ORAL DAILY
Qty: 90 TABLET | Refills: 0 | Status: SHIPPED | OUTPATIENT
Start: 2022-07-05 | End: 2022-07-07 | Stop reason: SDUPTHER

## 2022-07-07 ENCOUNTER — OFFICE VISIT (OUTPATIENT)
Dept: FAMILY MEDICINE CLINIC | Facility: CLINIC | Age: 57
End: 2022-07-07
Payer: COMMERCIAL

## 2022-07-07 VITALS
WEIGHT: 206 LBS | RESPIRATION RATE: 16 BRPM | BODY MASS INDEX: 35.17 KG/M2 | TEMPERATURE: 97.9 F | SYSTOLIC BLOOD PRESSURE: 124 MMHG | HEART RATE: 83 BPM | DIASTOLIC BLOOD PRESSURE: 78 MMHG | HEIGHT: 64 IN | OXYGEN SATURATION: 98 %

## 2022-07-07 DIAGNOSIS — E55.9 VITAMIN D DEFICIENCY: ICD-10-CM

## 2022-07-07 DIAGNOSIS — E78.2 MIXED HYPERLIPIDEMIA: ICD-10-CM

## 2022-07-07 DIAGNOSIS — G47.33 OBSTRUCTIVE SLEEP APNEA SYNDROME: ICD-10-CM

## 2022-07-07 DIAGNOSIS — F33.1 MAJOR DEPRESSIVE DISORDER, RECURRENT EPISODE, MODERATE (HCC): ICD-10-CM

## 2022-07-07 DIAGNOSIS — E66.9 OBESITY (BMI 30.0-34.9): Primary | ICD-10-CM

## 2022-07-07 PROCEDURE — 3725F SCREEN DEPRESSION PERFORMED: CPT | Performed by: FAMILY MEDICINE

## 2022-07-07 PROCEDURE — 99214 OFFICE O/P EST MOD 30 MIN: CPT | Performed by: FAMILY MEDICINE

## 2022-07-07 RX ORDER — ESCITALOPRAM OXALATE 10 MG/1
10 TABLET ORAL DAILY
Qty: 90 TABLET | Refills: 0 | Status: SHIPPED | OUTPATIENT
Start: 2022-07-07

## 2022-07-07 RX ORDER — ESCITALOPRAM OXALATE 5 MG/1
5 TABLET ORAL DAILY
Qty: 90 TABLET | Refills: 0 | Status: SHIPPED | OUTPATIENT
Start: 2022-07-07

## 2022-07-07 RX ORDER — FLUOCINOLONE ACETONIDE, HYDROQUINONE, AND TRETINOIN .1; 40; .5 MG/G; MG/G; MG/G
CREAM TOPICAL
COMMUNITY
Start: 2022-04-21

## 2022-07-07 NOTE — PROGRESS NOTES
Assessment/Plan: We talked about her weight issues which she has had for some time  She is tried diet exercise with not great results  Lower follow-up with weight management see what they have to offer  Also discussed the tenderness that she is suffering from  I do not really have a lot to offer her except for masking devices  I did offer referral to ENT or audiology she will hold off on that for now  I will call with the lab results  Diagnoses and all orders for this visit:    Obesity (BMI 30 0-34 9)  -     Ambulatory Referral to Weight Management; Future  -     Lipid panel  -     Hemoglobin A1C  -     Comprehensive metabolic panel  -     CBC and differential  -     TSH, 3rd generation with Free T4 reflex  -     Vitamin D 25 hydroxy  -     UA w Reflex to Microscopic w Reflex to Culture    Major depressive disorder, recurrent episode, moderate (HCC)  -     escitalopram (LEXAPRO) 10 mg tablet; Take 1 tablet (10 mg total) by mouth daily  -     escitalopram (LEXAPRO) 5 mg tablet; Take 1 tablet (5 mg total) by mouth daily    Vitamin D deficiency  -     Vitamin D 25 hydroxy    Mixed hyperlipidemia    Obstructive sleep apnea syndrome    Other orders  -     Tri-Tiffany 0 01-4-0 05 % CREA          Subjective:        Patient ID: Ron Rios is a 56 y o  female  Patient presents with:  Medication Refill  Tinnitus            The following portions of the patient's history were reviewed and updated as appropriate: allergies, current medications, past family history, past medical history, past social history, past surgical history and problem list       Review of Systems   Constitutional: Negative  HENT: Positive for tinnitus  Eyes: Negative  Respiratory: Negative  Cardiovascular: Negative  Gastrointestinal: Negative  Endocrine: Negative  Genitourinary: Negative  Musculoskeletal: Negative  Skin: Negative  Allergic/Immunologic: Negative  Neurological: Negative  Hematological: Negative  Psychiatric/Behavioral: Negative  All other systems reviewed and are negative  Objective:      BMI Counseling: Body mass index is 35 36 kg/m²  The BMI is above normal  Nutrition recommendations include decreasing portion sizes, encouraging healthy choices of fruits and vegetables, decreasing fast food intake, consuming healthier snacks, limiting drinks that contain sugar, moderation in carbohydrate intake, increasing intake of lean protein, reducing intake of saturated and trans fat and reducing intake of cholesterol  Exercise recommendations include moderate physical activity 150 minutes/week  No pharmacotherapy was ordered  Rationale for BMI follow-up plan is due to patient being overweight or obese  /78 (BP Location: Left arm, Patient Position: Sitting, Cuff Size: Adult)   Pulse 83   Temp 97 9 °F (36 6 °C) (Tympanic)   Resp 16   Ht 5' 4" (1 626 m)   Wt 93 4 kg (206 lb)   SpO2 98%   BMI 35 36 kg/m²          Physical Exam  Vitals and nursing note reviewed  Constitutional:       Appearance: She is well-developed  HENT:      Head: Normocephalic and atraumatic  Right Ear: External ear normal       Left Ear: External ear normal       Nose: Nose normal    Eyes:      Conjunctiva/sclera: Conjunctivae normal       Pupils: Pupils are equal, round, and reactive to light  Cardiovascular:      Rate and Rhythm: Normal rate and regular rhythm  Heart sounds: Normal heart sounds  Pulmonary:      Effort: Pulmonary effort is normal       Breath sounds: Normal breath sounds  Abdominal:      General: Bowel sounds are normal       Palpations: Abdomen is soft  Musculoskeletal:         General: Normal range of motion  Cervical back: Normal range of motion and neck supple  Skin:     General: Skin is warm and dry  Neurological:      Mental Status: She is alert and oriented to person, place, and time        Deep Tendon Reflexes: Reflexes are normal and symmetric     Psychiatric:         Behavior: Behavior normal

## 2022-07-13 PROBLEM — K64.4 EXTERNAL HEMORRHOIDS: Status: ACTIVE | Noted: 2022-07-13

## 2022-07-16 LAB
25(OH)D3 SERPL-MCNC: 34 NG/ML (ref 30–100)
ALBUMIN SERPL-MCNC: 4.3 G/DL (ref 3.6–5.1)
ALBUMIN/GLOB SERPL: 1.7 (CALC) (ref 1–2.5)
ALP SERPL-CCNC: 86 U/L (ref 37–153)
ALT SERPL-CCNC: 11 U/L (ref 6–29)
APPEARANCE UR: ABNORMAL
AST SERPL-CCNC: 10 U/L (ref 10–35)
BACTERIA UR QL AUTO: ABNORMAL /HPF
BASOPHILS # BLD AUTO: 67 CELLS/UL (ref 0–200)
BASOPHILS NFR BLD AUTO: 0.9 %
BILIRUB SERPL-MCNC: 0.4 MG/DL (ref 0.2–1.2)
BILIRUB UR QL STRIP: NEGATIVE
BUN SERPL-MCNC: 15 MG/DL (ref 7–25)
BUN/CREAT SERPL: ABNORMAL (CALC) (ref 6–22)
CALCIUM SERPL-MCNC: 9.1 MG/DL (ref 8.6–10.4)
CHLORIDE SERPL-SCNC: 105 MMOL/L (ref 98–110)
CHOLEST SERPL-MCNC: 250 MG/DL
CHOLEST/HDLC SERPL: 4 (CALC)
CO2 SERPL-SCNC: 26 MMOL/L (ref 20–32)
COLOR UR: YELLOW
CREAT SERPL-MCNC: 0.7 MG/DL (ref 0.5–1.03)
EOSINOPHIL # BLD AUTO: 111 CELLS/UL (ref 15–500)
EOSINOPHIL NFR BLD AUTO: 1.5 %
ERYTHROCYTE [DISTWIDTH] IN BLOOD BY AUTOMATED COUNT: 13.7 % (ref 11–15)
GFR/BSA.PRED SERPLBLD CYS-BASED-ARV: 102 ML/MIN/1.73M2
GLOBULIN SER CALC-MCNC: 2.5 G/DL (CALC) (ref 1.9–3.7)
GLUCOSE SERPL-MCNC: 134 MG/DL (ref 65–99)
GLUCOSE UR QL STRIP: NEGATIVE
HBA1C MFR BLD: 6.2 % OF TOTAL HGB
HCT VFR BLD AUTO: 40.7 % (ref 35–45)
HDLC SERPL-MCNC: 63 MG/DL
HGB BLD-MCNC: 13.6 G/DL (ref 11.7–15.5)
HGB UR QL STRIP: NEGATIVE
HYALINE CASTS #/AREA URNS LPF: ABNORMAL /LPF
KETONES UR QL STRIP: NEGATIVE
LDLC SERPL CALC-MCNC: 161 MG/DL (CALC)
LEUKOCYTE ESTERASE UR QL STRIP: NEGATIVE
LYMPHOCYTES # BLD AUTO: 2050 CELLS/UL (ref 850–3900)
LYMPHOCYTES NFR BLD AUTO: 27.7 %
MCH RBC QN AUTO: 29.1 PG (ref 27–33)
MCHC RBC AUTO-ENTMCNC: 33.4 G/DL (ref 32–36)
MCV RBC AUTO: 87 FL (ref 80–100)
MONOCYTES # BLD AUTO: 444 CELLS/UL (ref 200–950)
MONOCYTES NFR BLD AUTO: 6 %
NEUTROPHILS # BLD AUTO: 4729 CELLS/UL (ref 1500–7800)
NEUTROPHILS NFR BLD AUTO: 63.9 %
NITRITE UR QL STRIP: NEGATIVE
NONHDLC SERPL-MCNC: 187 MG/DL (CALC)
PH UR STRIP: ABNORMAL [PH] (ref 5–8)
PLATELET # BLD AUTO: 238 THOUSAND/UL (ref 140–400)
PMV BLD REES-ECKER: 10.6 FL (ref 7.5–12.5)
POTASSIUM SERPL-SCNC: 4.4 MMOL/L (ref 3.5–5.3)
PROT SERPL-MCNC: 6.8 G/DL (ref 6.1–8.1)
PROT UR QL STRIP: NEGATIVE
RBC # BLD AUTO: 4.68 MILLION/UL (ref 3.8–5.1)
RBC #/AREA URNS HPF: ABNORMAL /HPF
SODIUM SERPL-SCNC: 139 MMOL/L (ref 135–146)
SP GR UR STRIP: 1.03 (ref 1–1.03)
SQUAMOUS #/AREA URNS HPF: ABNORMAL /HPF
TRIGL SERPL-MCNC: 131 MG/DL
TSH SERPL-ACNC: 3.62 MIU/L
WBC # BLD AUTO: 7.4 THOUSAND/UL (ref 3.8–10.8)
WBC #/AREA URNS HPF: ABNORMAL /HPF

## 2022-08-16 ENCOUNTER — OFFICE VISIT (OUTPATIENT)
Dept: UROLOGY | Facility: AMBULATORY SURGERY CENTER | Age: 57
End: 2022-08-16
Payer: COMMERCIAL

## 2022-08-16 VITALS
DIASTOLIC BLOOD PRESSURE: 60 MMHG | HEIGHT: 64 IN | WEIGHT: 205 LBS | SYSTOLIC BLOOD PRESSURE: 130 MMHG | BODY MASS INDEX: 35 KG/M2

## 2022-08-16 DIAGNOSIS — N32.81 OVERACTIVE BLADDER: Primary | ICD-10-CM

## 2022-08-16 LAB — POST-VOID RESIDUAL VOLUME, ML POC: 26 ML

## 2022-08-16 PROCEDURE — 51798 US URINE CAPACITY MEASURE: CPT | Performed by: NURSE PRACTITIONER

## 2022-08-16 PROCEDURE — 99213 OFFICE O/P EST LOW 20 MIN: CPT | Performed by: NURSE PRACTITIONER

## 2022-08-16 RX ORDER — TROSPIUM CHLORIDE 20 MG/1
20 TABLET, FILM COATED ORAL 2 TIMES DAILY
Qty: 180 TABLET | Refills: 3 | Status: SHIPPED | OUTPATIENT
Start: 2022-08-16

## 2022-09-06 ENCOUNTER — CONSULT (OUTPATIENT)
Dept: BARIATRICS | Facility: CLINIC | Age: 57
End: 2022-09-06
Payer: COMMERCIAL

## 2022-09-06 VITALS
DIASTOLIC BLOOD PRESSURE: 80 MMHG | HEART RATE: 94 BPM | SYSTOLIC BLOOD PRESSURE: 132 MMHG | RESPIRATION RATE: 13 BRPM | WEIGHT: 202.9 LBS | TEMPERATURE: 97.8 F | BODY MASS INDEX: 34.64 KG/M2 | OXYGEN SATURATION: 98 % | HEIGHT: 64 IN

## 2022-09-06 DIAGNOSIS — F32.A DEPRESSION: Primary | ICD-10-CM

## 2022-09-06 DIAGNOSIS — G47.33 OBSTRUCTIVE SLEEP APNEA SYNDROME: ICD-10-CM

## 2022-09-06 DIAGNOSIS — E66.9 OBESITY (BMI 30.0-34.9): ICD-10-CM

## 2022-09-06 DIAGNOSIS — E78.2 MIXED HYPERLIPIDEMIA: ICD-10-CM

## 2022-09-06 PROCEDURE — 99244 OFF/OP CNSLTJ NEW/EST MOD 40: CPT | Performed by: NURSE PRACTITIONER

## 2022-09-06 NOTE — ASSESSMENT & PLAN NOTE
- Regular use of CPAP encouraged  - Risks of untreated sleep apnea reviewed, including increased risk for myocardial infarction and stroke, increased difficulty with weight loss, and risk of sudden cardiac death due to arrhythmia   - May improve with weight loss

## 2022-09-06 NOTE — ASSESSMENT & PLAN NOTE
- Discussed options of [facility]-Intensive Lifestyle Intervention Program, Very Low Calorie Diet-VLCD and Conservative Program and the role of weight loss medications  - She is not interested in weight loss surgery  - Explained the importance of making lifestyle changes first before starting anti-obesity medications  - She is not interested in weight loss medications  - Patient is interested in pursuing [facility]-Intensive Lifestyle Intervention Program  - Initial weight loss goal of 5-10% weight loss for improved health  - Weight loss can improve patient's co-morbid conditions and/or prevent weight-related complications  - Labs reviewed: A1C, TSH, CMP, and lipid panel 7/15/2022  A1C in prediabetes range at 6 2, glucose elevated, chol and LDL elevated, which will all likely improve with weight loss and lifestyle modifications  Otherwise, labs within acceptable range

## 2022-09-06 NOTE — PROGRESS NOTES
Assessment/Plan:    Obesity (BMI 30 0-34 9)  - Discussed options of [facility]-Intensive Lifestyle Intervention Program, Very Low Calorie Diet-VLCD and Conservative Program and the role of weight loss medications  - She is not interested in weight loss surgery  - Explained the importance of making lifestyle changes first before starting anti-obesity medications  - She is not interested in weight loss medications  - Patient is interested in pursuing [facility]-Intensive Lifestyle Intervention Program  - Initial weight loss goal of 5-10% weight loss for improved health  - Weight loss can improve patient's co-morbid conditions and/or prevent weight-related complications  - Labs reviewed: A1C, TSH, CMP, and lipid panel 7/15/2022  A1C in prediabetes range at 6 2, glucose elevated, chol and LDL elevated, which will all likely improve with weight loss and lifestyle modifications  Otherwise, labs within acceptable range  Depression  - Taking lexapro  Continue management with prescribing provider  Sleep apnea  - Regular use of CPAP encouraged  - Risks of untreated sleep apnea reviewed, including increased risk for myocardial infarction and stroke, increased difficulty with weight loss, and risk of sudden cardiac death due to arrhythmia   - May improve with weight loss  Hyperlipidemia  - May improve with weight loss and lifestyle modification  Ivonne Monte was seen today for consult  Diagnoses and all orders for this visit:    Depression    Obesity (BMI 30 0-34 9)  -     Ambulatory Referral to Weight Management    Obstructive sleep apnea syndrome    Mixed hyperlipidemia          Follow up in approximately end of [facility] with Non-Surgical Physician/Advanced Practitioner      Subjective:   Chief Complaint   Patient presents with    Consult     MWM Pt has sleep apnea, Goal wt 130, waist 43       Patient ID: Abby Molina  is a 56 y o  female with excess weight/obesity here to pursue weight management  Previous notes and records have been reviewed  Past Medical History:   Diagnosis Date    Anxiety     Depression     Incontinence     MVP (mitral valve prolapse)     Sleep apnea     mild to moderate, had cpap device but was recalled, not replaced yet     Past Surgical History:   Procedure Laterality Date    APPENDECTOMY      COLONOSCOPY      DILATION AND CURETTAGE OF UTERUS      HAND SURGERY      KELOID EXCISION      KELOID EXCISION Right     arm    NO PAST SURGERIES      WISDOM TOOTH EXTRACTION         HPI:  Wt Readings from Last 20 Encounters:   09/06/22 92 kg (202 lb 14 4 oz)   08/16/22 93 kg (205 lb)   07/13/22 93 kg (205 lb)   07/07/22 93 4 kg (206 lb)   01/19/22 89 8 kg (198 lb)   10/26/21 88 9 kg (196 lb)   08/17/21 90 3 kg (199 lb)   07/26/21 89 8 kg (198 lb)   08/10/20 93 kg (205 lb)   07/14/20 93 kg (205 lb)   02/27/20 95 7 kg (211 lb)   12/23/19 93 4 kg (206 lb)   09/17/19 89 2 kg (196 lb 11 2 oz)   09/12/19 88 9 kg (196 lb)   04/26/19 86 5 kg (190 lb 9 6 oz)   12/28/18 85 7 kg (189 lb)   11/26/18 88 kg (194 lb)   07/09/18 95 1 kg (209 lb 9 6 oz)   04/19/18 93 9 kg (207 lb)   07/06/17 92 2 kg (203 lb 4 oz)     Obesity/Excess Weight:  Severity: Mild  Onset:  Since teenager    Modifiers: Diet and Exercise and Commercial Weight Loss Programs-ie  Weight Watchers, Valentine [employer]ersham, [employer]ystem, etc   Contributing factors: Poor Food Choices  Associated symptoms: comorbid conditions, decreased self esteem, increased shortness of breath and clothes do not fit    On Metformin in the past developed nausea and vomiting  Was on wellbutrin in the past, but no difference with appetite  Feels that appetite is under good control, but tends to crave savory foods  Hydration: 1-2 glass of water, 2-3 cups coffee with half and half and stevia  Alcohol: none  Smoking: denies  Exercise: none  Occupation: writer  Sleep: 6 hours  STOP bang: Has LUCY  Has CPAP, but not using it  Highest weight: current  Current weight: 202 9 lbs  Goal weight: 130 lbs or size 6 or 8    Colonoscopy: INOCENCIA due Jan 2027  Mammogram: due, she will be discussing with gyn    The following portions of the patient's history were reviewed and updated as appropriate: allergies, current medications, past family history, past medical history, past social history, past surgical history, and problem list     Review of Systems   HENT: Negative for sore throat  Respiratory: Negative for cough and shortness of breath  Cardiovascular: Negative for chest pain and palpitations  Gastrointestinal: Positive for diarrhea (intermittent, possible lactose intolerance)  Negative for constipation, nausea and vomiting  Denies GERD   Endocrine: Negative for cold intolerance and heat intolerance  Genitourinary: Negative for dysuria  Musculoskeletal: Negative for arthralgias and back pain  Skin: Negative for rash  Neurological: Positive for headaches (catamenial )  Psychiatric/Behavioral: Negative for suicidal ideas (or HI)  + depression and anxiety controlled        Objective:  /80   Pulse 94   Temp 97 8 °F (36 6 °C)   Resp 13   Ht 5' 4" (1 626 m)   Wt 92 kg (202 lb 14 4 oz)   SpO2 98%   BMI 34 83 kg/m²     Physical Exam  Vitals and nursing note reviewed  Constitutional   General appearance: Abnormal   well developed and obese  Eyes No conjunctival injection  Ears, Nose, Mouth, and Throat Oral mucosa moist    Pulmonary   Respiratory effort: No increased work of breathing or signs of respiratory distress  Cardiovascular     Examination of extremities for edema and/or varicosities: Normal   no edema  Abdomen   Abdomen: Abnormal   The abdomen was obese      Musculoskeletal   Gait and station: Normal     Psychiatric   Orientation to person, place and time: Normal     Affect: appropriate

## 2022-10-10 NOTE — PROGRESS NOTES
Weight Management Medical Nutrition Assessment  Fawn Kunz is here for initial RD session for [facility]  Seen by XANDER ~6 wks ago  Current wt: 202 8 lbs  Seca completed, results reviewed  She reports she has poor water intake and is trying to eliminate diet soda  Excess calories via eating out  She estimates that at least half of her meals are not made at home  She does agree that it would be important to start eating at home more  Typically skips breakfast and would benefit from a meal replacement  Discussed the option of having an additional meal replacement at lunch if she knows she will be dining out for dinner  She has just re-joined the gym and has a goal of going 3x/wk  Meal plan provided  ?yogurt, cottage cheese due to ?  Lactose     Patient seen by Medical Provider in past 6 months:  yes  Requested to schedule appointment with Medical Provider: Yes      Anthropometric Measurements  Start Weight (#):  202 8 lbs 10/17/22  Current Weight (#): -  TBW % Change from start weight: -  Ideal Body Weight (#): 146 4 lbs BMI 25 (64" 120 lbs)  Goal Weight (#): 130 lbs   Highest: 203 lbs   Lowest:  115 lbs     Weight Loss History  Previous weight loss attempts: Commercial Programs ([employer], [employer]es, etc )  Exercise  Self Created Diets (Portion Control, Healthy Food Choices, etc )  Metformin (nausea)  Wellbutrin    Food and Nutrition Related History  Wake up:  6-9:00  Bed Time: 10-12    Food Recall  Breakfast: skip   Snack: 10-11:00: ham and cheese on 1 slice bread (coffee shop) OR whatever is in the fridge   Lunch: 2-4:00: s/w deli meat from out OR leftovers   Snack: skip   Dinner: 7-9:00: sushi OR Horacio or [place] food   Snack: skip or fruit    Beverages: coffee/tea, diet coke   Volume of beverage intake: poor water, 2 coffee w/h/h    Weekends: Same  Cravings: savory or baked good  Trouble area of day: always thinking about it    Frequency of Eating out: at least half of meals   Food restrictions: ?lactose intolerance   Cooking: spouse   Food Shopping: spouse     Physical Activity Intake  Activity:just re-joined the gym  Frequency:goal for 3x/wk  Physical limitations/barriers to exercise: n/a    Estimated Needs  Energy  SECA: BMR: 1593     X 1 3 -1000 =1071  [address]  [employer] Needs: BMR : 1500   1-2# loss weekly sedentary: 800-1300             1-2# loss weekly lightly active: 3327-1516  Maintenance calories for sedentary activity level: 1800  Protein: 65-82 gm      (1 2-1 5g/kg IBW)  Fluid:  64 oz     (35mL/kg IBW)    Nutrition Diagnosis  Yes; Overweight/obesity  related to Excess energy intake as evidenced by  BMI more than normative standard for age and sex (obesity-grade I 26-30  9)       Nutrition Intervention    Nutrition Prescription  Calories:950-1100  Protein: 70-95 gm  Carb:  gm    Meal Plan (Darwin/Pro/Carb)  Breakfast: 200, 27, 10  Snack: 0-100, 0-10, 0-20  Lunch: 250-300, 20, 20-30  Snack: 100-150, 5-10, 10-20  Dinner: 300-350, 20-30, 25-30  Snack: skip     Nutrition Education:    [facility] Manual  Calorie controlled menu  Lean protein food choices  Healthy snack options  Food journaling tips      Nutrition Counseling:  Strategies: meal planning, portion sizes, healthy snack choices, hydration, fiber intake, protein intake, exercise, food journal      Monitoring and Evaluation:  Evaluation criteria:  Energy Intake  Meet protein needs  Maintain adequate hydration  Monitor weekly weight  Meal planning/preparation  Food journal   Decreased portions at mealtimes and snacks  Physical activity     Barriers to learning:none  Readiness to change: Preparation:  (Getting ready to change)   Comprehension: good  Expected Compliance: good

## 2022-10-12 PROBLEM — J20.8 ACUTE BRONCHITIS DUE TO OTHER SPECIFIED ORGANISMS: Status: RESOLVED | Noted: 2021-10-06 | Resolved: 2022-10-12

## 2022-10-17 ENCOUNTER — OFFICE VISIT (OUTPATIENT)
Dept: BARIATRICS | Facility: CLINIC | Age: 57
End: 2022-10-17

## 2022-10-17 VITALS — HEIGHT: 64 IN | WEIGHT: 202.83 LBS | BODY MASS INDEX: 34.63 KG/M2

## 2022-10-17 DIAGNOSIS — R63.5 ABNORMAL WEIGHT GAIN: ICD-10-CM

## 2022-10-17 PROCEDURE — WMPRO12

## 2022-10-17 PROCEDURE — RECHECK

## 2022-10-25 NOTE — PROGRESS NOTES
Weight Management Medical Nutrition Assessment  Nitish ahn is here for f/u RD session for [facility]  Current wt: 200 1  lbs  She has lost 2 7 lbs x 2 wks  Feels she was doing better but went off track slightly with carbs  Still working on consistency  Finds that the meal replacement in the morning has been working well  Discussed importance of having protein and carb at every meal and snack  Has been food logging using [employer] but not sure if this has been helpful or not  At times having hunger and then tends to have more carbs  Discussed that this is where logging may help her to determine physical vs emotional hunger as well as if she was lacking in something earlier  Working on increasing water intake  Remains poor but better than prior  ?yogurt, cottage cheese due to ?  Lactose     Patient seen by Medical Provider in past 6 months:  yes  Requested to schedule appointment with Medical Provider: No      Anthropometric Measurements  Start Weight (#):  202 8 lbs 10/17/22  Current Weight (#): 200 1 lbs   TBW % Change from start weight:  1 3%  Ideal Body Weight (#): 146 4 lbs BMI 25 (64" 120 lbs)  Goal Weight (#): 130 lbs   Highest: 203 lbs   Lowest:  115 lbs     Weight Loss History  Previous weight loss attempts: Commercial Programs (Weight Watchers, Michell Sago, etc )  Exercise  Self Created Diets (Portion Control, Healthy Food Choices, etc )  Metformin (nausea)  Wellbutrin    Food and Nutrition Related History  Wake up:  6-9:00  Bed Time: 10-12    Food Recall  Breakfast: within 2 hours:  Meal replacement   Snack: 10-11:00: skip  Lunch: 1-2:00:  Chicken joseph salad w/lighter dressing (out) OR crackers, hummus OR leftover [place] [facility] food OR meal replacement   Snack: 3-6:00: fruit   Dinner: 7-9:00: shrimp, cabbage sushi OR Horacio or chinese food   Snack: skip     Beverages: coffee/tea, diet coke   Volume of beverage intake: 20 oz  water, 2 coffee w/h/h    Weekends: Same  Cravings: savory or baked good  Trouble area of day: always thinking about it    Frequency of Eating out: at least half of meals >>>>improving   Food restrictions: ?lactose intolerance   Cooking: spouse   Food Shopping: spouse     Physical Activity Intake  Activity:just re-joined the gym, yoga, walking   Frequency:goal for 3x/wk  Physical limitations/barriers to exercise: n/a    Estimated Needs  Energy  SECA: BMR: 1593     X 1 3 -1000 =1071  [address] [employer] Energy Needs: BMR : 1488   1-2# loss weekly sedentary: 237-0920             1-2# loss weekly lightly active: 9799-4229  Maintenance calories for sedentary activity level: 1785  Protein: 65-82 gm      (1 2-1 5g/kg IBW)  Fluid:  64 oz     (35mL/kg IBW)    Nutrition Diagnosis  Yes; Overweight/obesity  related to Excess energy intake as evidenced by  BMI more than normative standard for age and sex (obesity-grade I 26-30  9)       Nutrition Intervention    Nutrition Prescription  Calories:950-1100  Protein: 70-95 gm  Carb:  gm    Meal Plan (Darwin/Pro/Carb)  Breakfast: 200, 27, 10  Snack: 0-100, 0-10, 0-20  Lunch: 250-300, 20, 20-30  Snack: 100-150, 5-10, 10-20  Dinner: 300-350, 20-30, 25-30  Snack: skip     Nutrition Education:    [facility] Manual  Calorie controlled menu  Lean protein food choices  Healthy snack options  Food journaling tips      Nutrition Counseling:  Strategies: meal planning, portion sizes, healthy snack choices, hydration, fiber intake, protein intake, exercise, food journal      Monitoring and Evaluation:  Evaluation criteria:  Energy Intake  Meet protein needs  Maintain adequate hydration  Monitor weekly weight  Meal planning/preparation  Food journal   Decreased portions at mealtimes and snacks  Physical activity     Barriers to learning:none  Readiness to change: Action:  (Changing behavior)  Comprehension: good  Expected Compliance: good

## 2022-10-26 ENCOUNTER — RA CDI HCC (OUTPATIENT)
Dept: OTHER | Facility: HOSPITAL | Age: 57
End: 2022-10-26

## 2022-10-26 NOTE — PROGRESS NOTES
[facility]  coding opportunities       Chart reviewed, no opportunity found: CHART REVIEWED, NO OPPORTUNITY FOUND        Patients Insurance        Commercial Insurance: [address] [place] [address]

## 2022-10-27 ENCOUNTER — CLINICAL SUPPORT (OUTPATIENT)
Dept: BARIATRICS | Facility: CLINIC | Age: 57
End: 2022-10-27

## 2022-10-27 VITALS — WEIGHT: 199 LBS | HEIGHT: 64 IN | BODY MASS INDEX: 33.97 KG/M2

## 2022-10-27 DIAGNOSIS — R63.5 ABNORMAL WEIGHT GAIN: Primary | ICD-10-CM

## 2022-10-27 PROCEDURE — RECHECK

## 2022-10-31 ENCOUNTER — OFFICE VISIT (OUTPATIENT)
Dept: BARIATRICS | Facility: CLINIC | Age: 57
End: 2022-10-31

## 2022-10-31 VITALS — BODY MASS INDEX: 34.16 KG/M2 | HEIGHT: 64 IN | WEIGHT: 200.1 LBS

## 2022-10-31 DIAGNOSIS — R63.5 ABNORMAL WEIGHT GAIN: Primary | ICD-10-CM

## 2022-11-03 ENCOUNTER — CLINICAL SUPPORT (OUTPATIENT)
Dept: BARIATRICS | Facility: CLINIC | Age: 57
End: 2022-11-03

## 2022-11-03 VITALS — HEIGHT: 64 IN | WEIGHT: 198.4 LBS | BODY MASS INDEX: 33.87 KG/M2

## 2022-11-03 DIAGNOSIS — R63.5 ABNORMAL WEIGHT GAIN: Primary | ICD-10-CM

## 2022-11-10 ENCOUNTER — CLINICAL SUPPORT (OUTPATIENT)
Dept: BARIATRICS | Facility: CLINIC | Age: 57
End: 2022-11-10

## 2022-11-10 VITALS — BODY MASS INDEX: 34.01 KG/M2 | HEIGHT: 64 IN | WEIGHT: 199.2 LBS

## 2022-11-10 DIAGNOSIS — R63.5 ABNORMAL WEIGHT GAIN: Primary | ICD-10-CM

## 2022-11-17 ENCOUNTER — CLINICAL SUPPORT (OUTPATIENT)
Dept: BARIATRICS | Facility: CLINIC | Age: 57
End: 2022-11-17

## 2022-11-17 VITALS — WEIGHT: 199.2 LBS | HEIGHT: 64 IN | BODY MASS INDEX: 34.01 KG/M2

## 2022-11-17 DIAGNOSIS — R63.5 ABNORMAL WEIGHT GAIN: Primary | ICD-10-CM

## 2022-11-29 NOTE — PROGRESS NOTES
[facility]:  Behavioral Health Support for weight loss  Following [employer] October  Per chart:  Anxiety with panic attack:  Depression with Rx   Attended today's H/C class   support:  Discussed internal motivation to implement changes to work towards her weight loss and overall health  Pt stated there is a 'piece' missing between her knowledge and education and putting the same into action  Report history of weight loss attempts that were not all successful  Pt expressed concern that this attempt might not be successful based off history  Challenged her thought process and encouraged her to be more positive and pause between impulsive thoughts and action  Practice some mindful techniques  Provided contact information

## 2022-12-01 ENCOUNTER — OFFICE VISIT (OUTPATIENT)
Dept: BARIATRICS | Facility: CLINIC | Age: 57
End: 2022-12-01

## 2022-12-01 ENCOUNTER — CLINICAL SUPPORT (OUTPATIENT)
Dept: BARIATRICS | Facility: CLINIC | Age: 57
End: 2022-12-01

## 2022-12-01 VITALS — BODY MASS INDEX: 34.25 KG/M2 | HEIGHT: 64 IN | WEIGHT: 200.6 LBS

## 2022-12-01 DIAGNOSIS — R63.5 ABNORMAL WEIGHT GAIN: Primary | ICD-10-CM

## 2022-12-08 ENCOUNTER — CLINICAL SUPPORT (OUTPATIENT)
Dept: BARIATRICS | Facility: CLINIC | Age: 57
End: 2022-12-08

## 2022-12-08 VITALS — HEIGHT: 64 IN | BODY MASS INDEX: 34.28 KG/M2 | WEIGHT: 200.8 LBS

## 2022-12-08 DIAGNOSIS — R63.5 ABNORMAL WEIGHT GAIN: Primary | ICD-10-CM

## 2022-12-15 ENCOUNTER — OFFICE VISIT (OUTPATIENT)
Dept: BARIATRICS | Facility: CLINIC | Age: 57
End: 2022-12-15

## 2022-12-15 VITALS — HEIGHT: 64 IN | WEIGHT: 199.6 LBS | BODY MASS INDEX: 34.08 KG/M2

## 2022-12-15 DIAGNOSIS — R63.5 ABNORMAL WEIGHT GAIN: Primary | ICD-10-CM

## 2022-12-15 NOTE — PROGRESS NOTES
Weight Management Medical Nutrition Assessment  Ivonne Monte is here for f/u RD session for [facility]  Current wt: 199 6   lbs  She has lost  3 2 lbs x 2 months  Not always measuring but is trying to log  Struggling with the connection of what motivates her vs consequences of eating unhealthy  Recommend write down specific motivators to see if this helps her to refocus when feeling less motivated  She has continued poor water intake which is also likely making weight loss slower  Suggest she focus on water intake over the next week  Strategies suggested       Patient seen by Medical Provider in past 6 months:  yes  Requested to schedule appointment with Medical Provider: No    Anthropometric Measurements  Start Weight (#):  202 8 lbs 10/17/22  Current Weight (#): 199 6  lbs   TBW % Change from start weight:   1 6%  Ideal Body Weight (#): 146 4 lbs BMI 25 (64" 120 lbs)  Goal Weight (#): 130 lbs   Highest: 203 lbs   Lowest:  115 lbs     Weight Loss History  Previous weight loss attempts: Commercial Programs ([employer], Gwen Fitzgerald, etc )  Exercise  Self Created Diets (Portion Control, Healthy Food Choices, etc )  Metformin (nausea)  Wellbutrin    Food and Nutrition Related History  Wake up:  6-9:00  Bed Time: 10-12    Food Recall  Breakfast: within 2 hours:  3-4 oz Smoked salmon, farnaz OR cottage cheese, fruit OR meal replacement  Snack: 10-11:00: skip  Lunch: 12:30-1:00:  Chicken joseph salad w/lighter dressing   OR tuna, 1 tbsp cabello, celery, pickles  Snack: 3-6:00:    Dinner: 7-9:00: 4 oz protein, 1/2 cup carb, veggies  Snack: coffee w/milk    Beverages: coffee/tea, diet coke   Volume of beverage intake: 20 oz  water, 2 coffee w/1-2 tbsp h/h     Weekends: Same  Cravings: savory or baked good  Trouble area of day: always thinking about it    Frequency of Eating out: at least half of meals >>>>improving   Food restrictions: ?lactose intolerance   Cooking: spouse   Food Shopping: spouse     Physical Activity Intake  Activity:just re-joined the gym, yoga, walking   Frequency:2x/wk  Physical limitations/barriers to exercise: n/a    Estimated Needs  Energy  SECA: BMR: 1593     X 1 3 -1000 =1071  [address]  [employer] [place] [employer] Energy Needs: BMR : 1488   1-2# loss weekly sedentary: 111-6696             1-2# loss weekly lightly active: 4363-4520  Maintenance calories for sedentary activity level: 1785  Protein: 65-82 gm      (1 2-1 5g/kg IBW)  Fluid:  64 oz     (35mL/kg IBW)    Nutrition Diagnosis  Yes; Overweight/obesity  related to Excess energy intake as evidenced by  BMI more than normative standard for age and sex (obesity-grade I 26-30  9)       Nutrition Intervention    Nutrition Prescription  Calories:950-1100  Protein: 70-95 gm  Carb:  gm    Meal Plan (Darwin/Pro/Carb)  Breakfast: 200, 27, 10  Snack: 0-100, 0-10, 0-20  Lunch: 250-300, 20, 20-30  Snack: 100-150, 5-10, 10-20  Dinner: 300-350, 20-30, 25-30  Snack: skip     Nutrition Education:    Healthy [facility] Manual  Calorie controlled menu  Lean protein food choices  Healthy snack options  Food journaling tips      Nutrition Counseling:  Strategies: meal planning, portion sizes, healthy snack choices, hydration, fiber intake, protein intake, exercise, food journal      Monitoring and Evaluation:  Evaluation criteria:  Energy Intake  Meet protein needs  Maintain adequate hydration  Monitor weekly weight  Meal planning/preparation  Food journal   Decreased portions at mealtimes and snacks  Physical activity     Barriers to learning:none  Readiness to change: Preparation:  (Getting ready to change)  and Action:  (Changing behavior)  Comprehension: good  Expected Compliance: good

## 2022-12-29 DIAGNOSIS — F33.1 MAJOR DEPRESSIVE DISORDER, RECURRENT EPISODE, MODERATE (HCC): ICD-10-CM

## 2022-12-29 RX ORDER — ESCITALOPRAM OXALATE 10 MG/1
10 TABLET ORAL DAILY
Qty: 90 TABLET | Refills: 0 | Status: SHIPPED | OUTPATIENT
Start: 2022-12-29

## 2022-12-29 RX ORDER — ESCITALOPRAM OXALATE 5 MG/1
5 TABLET ORAL DAILY
Qty: 90 TABLET | Refills: 0 | Status: SHIPPED | OUTPATIENT
Start: 2022-12-29

## 2023-02-07 ENCOUNTER — TELEPHONE (OUTPATIENT)
Dept: BARIATRICS | Facility: CLINIC | Age: 58
End: 2023-02-07

## 2023-03-15 ENCOUNTER — OFFICE VISIT (OUTPATIENT)
Dept: FAMILY MEDICINE CLINIC | Facility: CLINIC | Age: 58
End: 2023-03-15

## 2023-03-15 VITALS
WEIGHT: 204 LBS | HEIGHT: 64 IN | TEMPERATURE: 100.7 F | BODY MASS INDEX: 34.83 KG/M2 | DIASTOLIC BLOOD PRESSURE: 86 MMHG | HEART RATE: 114 BPM | OXYGEN SATURATION: 95 % | SYSTOLIC BLOOD PRESSURE: 124 MMHG

## 2023-03-15 DIAGNOSIS — J11.1 INFLUENZA: Primary | ICD-10-CM

## 2023-03-15 RX ORDER — OSELTAMIVIR PHOSPHATE 75 MG/1
75 CAPSULE ORAL 2 TIMES DAILY
Qty: 10 CAPSULE | Refills: 0 | Status: SHIPPED | OUTPATIENT
Start: 2023-03-15 | End: 2023-03-20

## 2023-03-16 LAB
FLUAV RNA RESP QL NAA+PROBE: POSITIVE
FLUBV RNA RESP QL NAA+PROBE: NEGATIVE
SARS-COV-2 RNA RESP QL NAA+PROBE: NEGATIVE

## 2023-03-16 NOTE — PROGRESS NOTES
COVID-19 Outpatient Progress Note    Assessment/Plan:    Problem List Items Addressed This Visit        Respiratory    Influenza - Primary    Relevant Medications    oseltamivir (TAMIFLU) 75 mg capsule    Other Relevant Orders    Covid/Flu- Office Collect (Completed)      Disposition:     PCR testing will be performed to test for COVID-19/Influenza  I have spent a total time of 10 minutes on the day of the encounter for this patient including risks and benefits of treatment options, instructions for management and patient and family education  Encounter provider: Venessa Gayle MD     Provider located at: [address] [place] [address] [phone] [facility] [id number]     Recent Visits  Date Type Provider Dept   03/15/23 Office Visit Venessa Gayle MD [address] [facility] recent visits within past 7 days and meeting all other requirements  Future Appointments  No visits were found meeting these conditions  Showing future appointments within next 150 days and meeting all other requirements     Subjective:   Oliverio Wall is a 56 y o  female who is concerned about COVID-19  Patient's symptoms include fever, chills, nasal congestion and cough             COVID-19 vaccination status: Fully vaccinated (primary series)    Exposure:     Hospitalized recently for fever and/or lower respiratory symptoms?: No      Currently a healthcare worker that is involved in direct patient care?: No      Works in a special setting where the risk of COVID-19 transmission may be high? (this may include long-term care, correctional and residential facilities; homeless shelters; assisted-living facilities and group homes ): No      Resident in a special setting where the risk of COVID-19 transmission may be high? (this may include long-term care, correctional and residential facilities; homeless shelters; assisted-living facilities and group homes ): No      Lab Results   Component Value Date    SARSCOV2 Negative 03/15/2023    SARSCOV2 Negative 09/13/2021       Review of Systems   Constitutional: Positive for chills and fever  HENT: Positive for congestion and sinus pressure  Eyes: Negative  Respiratory: Positive for cough  Cardiovascular: Negative  Gastrointestinal: Negative  Endocrine: Negative  Genitourinary: Negative  Musculoskeletal: Negative  Allergic/Immunologic: Negative  Neurological: Negative  Hematological: Negative  Psychiatric/Behavioral: Negative  All other systems reviewed and are negative  Current Outpatient Medications on File Prior to Visit   Medication Sig   • escitalopram (LEXAPRO) 10 mg tablet Take 1 tablet (10 mg total) by mouth daily   • escitalopram (LEXAPRO) 5 mg tablet Take 1 tablet (5 mg total) by mouth daily   • Tri-Tiffany 0 01-4-0 05 % CREA    • trospium chloride (SANCTURA) 20 mg tablet Take 1 tablet (20 mg total) by mouth 2 (two) times a day       Objective:    /86 (BP Location: Right arm, Patient Position: Sitting, Cuff Size: Large)   Pulse (!) 114   Temp (!) 100 7 °F (38 2 °C) (Temporal)   Ht 5' 4" (1 626 m)   Wt 92 5 kg (204 lb)   SpO2 95%   BMI 35 02 kg/m²      Physical Exam  Constitutional:       General: She is not in acute distress  Appearance: She is well-developed  She is not diaphoretic  HENT:      Head: Normocephalic and atraumatic  Right Ear: External ear normal       Left Ear: External ear normal       Nose: Nose normal       Mouth/Throat:      Pharynx: No oropharyngeal exudate  Eyes:      General:         Right eye: No discharge  Left eye: No discharge  Conjunctiva/sclera: Conjunctivae normal       Pupils: Pupils are equal, round, and reactive to light  Neck:      Thyroid: No thyromegaly  Trachea: No tracheal deviation  Cardiovascular:      Rate and Rhythm: Normal rate and regular rhythm  Heart sounds: Normal heart sounds  No murmur heard  No friction rub  No gallop  Pulmonary:      Effort: Pulmonary effort is normal  No respiratory distress  Breath sounds: Normal breath sounds  Abdominal:      General: There is no distension  Palpations: Abdomen is soft  Tenderness: There is no abdominal tenderness  There is no guarding or rebound  Musculoskeletal:         General: Normal range of motion  Lymphadenopathy:      Cervical: No cervical adenopathy  Skin:     General: Skin is warm  Neurological:      Mental Status: She is alert and oriented to person, place, and time  Cranial Nerves: No cranial nerve deficit  Psychiatric:         Behavior: Behavior normal          Thought Content:  Thought content normal          Judgment: Judgment normal        Vinicius Coffman MD

## 2023-03-16 NOTE — RESULT ENCOUNTER NOTE
Please call patient  Test was positive for flu A recommend finishing course of tamiflu  Call if not improving or worsening  Will discuss more fully at next visit

## 2023-04-03 DIAGNOSIS — F33.1 MAJOR DEPRESSIVE DISORDER, RECURRENT EPISODE, MODERATE (HCC): ICD-10-CM

## 2023-04-03 RX ORDER — ESCITALOPRAM OXALATE 10 MG/1
10 TABLET ORAL DAILY
Qty: 90 TABLET | Refills: 0 | Status: SHIPPED | OUTPATIENT
Start: 2023-04-03

## 2023-04-03 RX ORDER — ESCITALOPRAM OXALATE 5 MG/1
5 TABLET ORAL DAILY
Qty: 90 TABLET | Refills: 0 | Status: SHIPPED | OUTPATIENT
Start: 2023-04-03

## 2023-05-02 ENCOUNTER — OFFICE VISIT (OUTPATIENT)
Dept: FAMILY MEDICINE CLINIC | Facility: CLINIC | Age: 58
End: 2023-05-02

## 2023-05-02 VITALS
DIASTOLIC BLOOD PRESSURE: 80 MMHG | OXYGEN SATURATION: 99 % | WEIGHT: 201.2 LBS | BODY MASS INDEX: 34.54 KG/M2 | SYSTOLIC BLOOD PRESSURE: 126 MMHG | HEART RATE: 87 BPM | TEMPERATURE: 97.9 F

## 2023-05-02 DIAGNOSIS — E55.9 VITAMIN D DEFICIENCY: ICD-10-CM

## 2023-05-02 DIAGNOSIS — Z00.00 WELL ADULT EXAM: ICD-10-CM

## 2023-05-02 DIAGNOSIS — E78.2 MIXED HYPERLIPIDEMIA: ICD-10-CM

## 2023-05-02 DIAGNOSIS — F33.1 MAJOR DEPRESSIVE DISORDER, RECURRENT EPISODE, MODERATE (HCC): ICD-10-CM

## 2023-05-02 DIAGNOSIS — R25.1 TREMOR OF BOTH HANDS: Primary | ICD-10-CM

## 2023-05-02 DIAGNOSIS — G47.33 OBSTRUCTIVE SLEEP APNEA SYNDROME: ICD-10-CM

## 2023-05-02 NOTE — PROGRESS NOTES
Name: Eli Schuster      : 1965      MRN: [id number]  Encounter Provider: Sybil Garsia DO  Encounter Date: 2023   Encounter department: [address] [place] [address]     1  Tremor of both hands  -     Ambulatory Referral to Neurology; Future    2  Vitamin D deficiency  -     Vitamin D 25 hydroxy    3  Major depressive disorder, recurrent episode, moderate (HCC)    4  Obstructive sleep apnea syndrome    5  Mixed hyperlipidemia  -     Lipid panel    6  Well adult exam  -     Lipid panel  -     Hemoglobin A1C  -     Comprehensive metabolic panel  -     CBC and differential  -     TSH, 3rd generation with Free T4 reflex  -     UA w Reflex to Microscopic w Reflex to Culture  -     Vitamin D 25 hydroxy  -     Microalbumin / creatinine urine ratio    BMI Counseling: Body mass index is 34 54 kg/m²  The BMI is above normal  Nutrition recommendations include decreasing portion sizes, encouraging healthy choices of fruits and vegetables, decreasing fast food intake, consuming healthier snacks, limiting drinks that contain sugar, moderation in carbohydrate intake, increasing intake of lean protein, reducing intake of saturated and trans fat and reducing intake of cholesterol  Exercise recommendations include moderate physical activity 150 minutes/week  No pharmacotherapy was ordered  Rationale for BMI follow-up plan is due to patient being overweight or obese  Subjective      Patient presents with:  Tremors: This has been going on along time, but seems to be getting worse  Mostly noted in hands when in a pinching motion  Also, feels like there are tremors inside body    She does have concerns of Parkinson's  Also she has been on Lexapro for some time and is concerned about SSRI tremors  She is slowly weaning from the Lexapro  Review of Systems   Constitutional: Negative  HENT: Negative  Eyes: Negative  Respiratory: Negative  Cardiovascular: Negative  Gastrointestinal: Negative  Endocrine: Negative  Genitourinary: Negative  Musculoskeletal: Negative  Skin: Negative  Allergic/Immunologic: Negative  Neurological: Positive for tremors  Hematological: Negative  Psychiatric/Behavioral: Negative  All other systems reviewed and are negative  Current Outpatient Medications on File Prior to Visit   Medication Sig    escitalopram (LEXAPRO) 10 mg tablet Take 1 tablet (10 mg total) by mouth daily    Tri-Tiffany 0 01-4-0 05 % CREA     trospium chloride (SANCTURA) 20 mg tablet Take 1 tablet (20 mg total) by mouth 2 (two) times a day    [DISCONTINUED] escitalopram (LEXAPRO) 5 mg tablet Take 1 tablet (5 mg total) by mouth daily (Patient not taking: Reported on 5/2/2023)       Objective     /80 (BP Location: Left arm, Patient Position: Sitting, Cuff Size: Large)   Pulse 87   Temp 97 9 °F (36 6 °C) (Temporal)   Wt 91 3 kg (201 lb 3 2 oz)   SpO2 99%   BMI 34 54 kg/m²     Physical Exam  Vitals and nursing note reviewed  Constitutional:       Appearance: She is well-developed  HENT:      Head: Normocephalic and atraumatic  Right Ear: External ear normal       Left Ear: External ear normal       Nose: Nose normal    Eyes:      Conjunctiva/sclera: Conjunctivae normal       Pupils: Pupils are equal, round, and reactive to light  Cardiovascular:      Rate and Rhythm: Normal rate and regular rhythm  Heart sounds: Normal heart sounds  Pulmonary:      Effort: Pulmonary effort is normal       Breath sounds: Normal breath sounds  Abdominal:      General: Bowel sounds are normal       Palpations: Abdomen is soft  Musculoskeletal:         General: Normal range of motion  Cervical back: Normal range of motion and neck supple  Skin:     General: Skin is warm and dry  Neurological:      General: No focal deficit present  Mental Status: She is alert and oriented to person, place, and time  Cranial Nerves:  No cranial nerve deficit  Sensory: No sensory deficit  Motor: No weakness  Coordination: Coordination normal       Gait: Gait normal       Deep Tendon Reflexes: Reflexes are normal and symmetric   Reflexes normal    Psychiatric:         Mood and Affect: Mood normal          Behavior: Behavior normal        Elvira Borges DO

## 2023-05-04 LAB
25(OH)D3 SERPL-MCNC: 27 NG/ML (ref 30–100)
ALBUMIN SERPL-MCNC: 4.1 G/DL (ref 3.6–5.1)
ALBUMIN/CREAT UR: 3 MCG/MG CREAT
ALBUMIN/GLOB SERPL: 1.5 (CALC) (ref 1–2.5)
ALP SERPL-CCNC: 91 U/L (ref 37–153)
ALT SERPL-CCNC: 14 U/L (ref 6–29)
APPEARANCE UR: CLEAR
AST SERPL-CCNC: 11 U/L (ref 10–35)
BACTERIA UR QL AUTO: ABNORMAL /HPF
BASOPHILS # BLD AUTO: 90 CELLS/UL (ref 0–200)
BASOPHILS NFR BLD AUTO: 1.3 %
BILIRUB SERPL-MCNC: 0.3 MG/DL (ref 0.2–1.2)
BILIRUB UR QL STRIP: NEGATIVE
BUN SERPL-MCNC: 14 MG/DL (ref 7–25)
BUN/CREAT SERPL: ABNORMAL (CALC) (ref 6–22)
CALCIUM SERPL-MCNC: 9.2 MG/DL (ref 8.6–10.4)
CHLORIDE SERPL-SCNC: 104 MMOL/L (ref 98–110)
CHOLEST SERPL-MCNC: 219 MG/DL
CHOLEST/HDLC SERPL: 3.5 (CALC)
CO2 SERPL-SCNC: 27 MMOL/L (ref 20–32)
COLOR UR: YELLOW
CREAT SERPL-MCNC: 0.68 MG/DL (ref 0.5–1.03)
CREAT UR-MCNC: 179 MG/DL (ref 20–275)
EOSINOPHIL # BLD AUTO: 138 CELLS/UL (ref 15–500)
EOSINOPHIL NFR BLD AUTO: 2 %
ERYTHROCYTE [DISTWIDTH] IN BLOOD BY AUTOMATED COUNT: 13.5 % (ref 11–15)
GFR/BSA.PRED SERPLBLD CYS-BASED-ARV: 102 ML/MIN/1.73M2
GLOBULIN SER CALC-MCNC: 2.8 G/DL (CALC) (ref 1.9–3.7)
GLUCOSE SERPL-MCNC: 139 MG/DL (ref 65–99)
GLUCOSE UR QL STRIP: NEGATIVE
HBA1C MFR BLD: 6.2 % OF TOTAL HGB
HCT VFR BLD AUTO: 40.9 % (ref 35–45)
HDLC SERPL-MCNC: 63 MG/DL
HGB BLD-MCNC: 13.9 G/DL (ref 11.7–15.5)
HGB UR QL STRIP: NEGATIVE
HYALINE CASTS #/AREA URNS LPF: ABNORMAL /LPF
KETONES UR QL STRIP: NEGATIVE
LDLC SERPL CALC-MCNC: 131 MG/DL (CALC)
LEUKOCYTE ESTERASE UR QL STRIP: NEGATIVE
LYMPHOCYTES # BLD AUTO: 2477 CELLS/UL (ref 850–3900)
LYMPHOCYTES NFR BLD AUTO: 35.9 %
MCH RBC QN AUTO: 29.1 PG (ref 27–33)
MCHC RBC AUTO-ENTMCNC: 34 G/DL (ref 32–36)
MCV RBC AUTO: 85.6 FL (ref 80–100)
MICROALBUMIN UR-MCNC: 0.6 MG/DL
MONOCYTES # BLD AUTO: 386 CELLS/UL (ref 200–950)
MONOCYTES NFR BLD AUTO: 5.6 %
NEUTROPHILS # BLD AUTO: 3809 CELLS/UL (ref 1500–7800)
NEUTROPHILS NFR BLD AUTO: 55.2 %
NITRITE UR QL STRIP: NEGATIVE
NONHDLC SERPL-MCNC: 156 MG/DL (CALC)
PH UR STRIP: 5.5 [PH] (ref 5–8)
PLATELET # BLD AUTO: 253 THOUSAND/UL (ref 140–400)
PMV BLD REES-ECKER: 10.7 FL (ref 7.5–12.5)
POTASSIUM SERPL-SCNC: 4.5 MMOL/L (ref 3.5–5.3)
PROT SERPL-MCNC: 6.9 G/DL (ref 6.1–8.1)
PROT UR QL STRIP: NEGATIVE
RBC # BLD AUTO: 4.78 MILLION/UL (ref 3.8–5.1)
RBC #/AREA URNS HPF: ABNORMAL /HPF
SODIUM SERPL-SCNC: 139 MMOL/L (ref 135–146)
SP GR UR STRIP: 1.03 (ref 1–1.03)
SQUAMOUS #/AREA URNS HPF: ABNORMAL /HPF
TRIGL SERPL-MCNC: 130 MG/DL
TSH SERPL-ACNC: 3.94 MIU/L (ref 0.4–4.5)
WBC # BLD AUTO: 6.9 THOUSAND/UL (ref 3.8–10.8)
WBC #/AREA URNS HPF: ABNORMAL /HPF

## 2023-05-14 PROBLEM — J11.1 INFLUENZA: Status: RESOLVED | Noted: 2023-03-15 | Resolved: 2023-05-14

## 2023-06-13 ENCOUNTER — TELEPHONE (OUTPATIENT)
Dept: ADMINISTRATIVE | Facility: OTHER | Age: 58
End: 2023-06-13

## 2023-06-13 ENCOUNTER — OFFICE VISIT (OUTPATIENT)
Dept: FAMILY MEDICINE CLINIC | Facility: CLINIC | Age: 58
End: 2023-06-13
Payer: COMMERCIAL

## 2023-06-13 VITALS
TEMPERATURE: 97.6 F | HEART RATE: 88 BPM | BODY MASS INDEX: 34.79 KG/M2 | OXYGEN SATURATION: 97 % | WEIGHT: 203.8 LBS | DIASTOLIC BLOOD PRESSURE: 84 MMHG | HEIGHT: 64 IN | SYSTOLIC BLOOD PRESSURE: 136 MMHG

## 2023-06-13 DIAGNOSIS — E66.9 OBESITY (BMI 30.0-34.9): ICD-10-CM

## 2023-06-13 DIAGNOSIS — R25.9 ABNORMAL INVOLUNTARY MOVEMENTS: ICD-10-CM

## 2023-06-13 DIAGNOSIS — E55.9 VITAMIN D DEFICIENCY: ICD-10-CM

## 2023-06-13 DIAGNOSIS — E78.2 MIXED HYPERLIPIDEMIA: ICD-10-CM

## 2023-06-13 DIAGNOSIS — F33.1 MAJOR DEPRESSIVE DISORDER, RECURRENT EPISODE, MODERATE (HCC): Primary | ICD-10-CM

## 2023-06-13 PROCEDURE — 99214 OFFICE O/P EST MOD 30 MIN: CPT | Performed by: FAMILY MEDICINE

## 2023-06-13 NOTE — TELEPHONE ENCOUNTER
----- Message from Patrick Herrera sent at 6/13/2023  9:08 AM EDT -----  06/13/23 9:08 AM    Hello, our patient Debora Llamas has had Mammogram and Pap Smear (HPV) aka Cervical Cancer Screening completed/performed  Please assist in updating the patient chart by pulling the Care Everywhere (CE) document  The date of service for the mammogram is 10/31/2022 and PAP smear 10/16/2020       Thank you,  Zo Desouza MA  [facility]

## 2023-06-13 NOTE — PROGRESS NOTES
Name: Kael Jimenez      : 1965      MRN: [id number]  Encounter Provider: Yessica Yepez DO  Encounter Date: 2023   Encounter department: [address] [place] [address]     1  Major depressive disorder, recurrent episode, moderate ([facility] )    2  Vitamin D deficiency    3  Obesity (BMI 30 0-34 9)    4  Mixed hyperlipidemia    5  Abnormal involuntary movements  -     Ambulatory Referral to Neurology; Future    Is doing well  Continue Lexapro 10 mg a day for now she does suffer from tremors she is not sure if it is related to the Lexapro or if there are essential tremors  We will try to get here to neurology for evaluation  Follow-up here for an annual well visit in the near future         Subjective     HPI  Review of Systems    Past Medical History:   Diagnosis Date   • Anxiety    • Depression    • Incontinence    • MVP (mitral valve prolapse)    • Obesity    • Sleep apnea     mild to moderate, had cpap device but was recalled, not replaced yet   • Urinary tract infection      Past Surgical History:   Procedure Laterality Date   • APPENDECTOMY     • COLONOSCOPY     • DILATION AND CURETTAGE OF UTERUS     • HAND SURGERY     • KELOID EXCISION     • KELOID EXCISION Right     arm   • NO PAST SURGERIES     • WISDOM TOOTH EXTRACTION       Family History   Problem Relation Age of Onset   • Hyperlipidemia Mother    • Glaucoma Mother    • Heart disease Father    • Thyroid disease Sister    • Cancer Paternal Grandmother         Bladder CA   • Diabetes Paternal Grandfather    • Angina Paternal Grandfather    • Hypertension Neg Hx    • Stroke Neg Hx      Social History     Socioeconomic History   • Marital status: /Civil Union     Spouse name: None   • Number of children: None   • Years of education: None   • Highest education level: None   Occupational History   • Occupation: working    Tobacco Use   • Smoking status: Never   • Smokeless tobacco: Never   Vaping Use   • Vaping "Use: Never used   Substance and Sexual Activity   • Alcohol use: Yes     Comment: rare socially   • Drug use: No   • Sexual activity: Yes     Partners: Male     Birth control/protection: None   Other Topics Concern   • None   Social History Narrative   • None     Social Determinants of Health     Financial Resource Strain: Not on file   Food Insecurity: Not on file   Transportation Needs: Not on file   Physical Activity: Not on file   Stress: Not on file   Social Connections: Not on file   Intimate Partner Violence: Not on file   Housing Stability: Not on file     Current Outpatient Medications on File Prior to Visit   Medication Sig   • escitalopram (LEXAPRO) 10 mg tablet Take 1 tablet (10 mg total) by mouth daily   • Tri-Tiffany 0 01-4-0 05 % CREA    • trospium chloride (SANCTURA) 20 mg tablet Take 1 tablet (20 mg total) by mouth 2 (two) times a day     No Known Allergies  Immunization History   Administered Date(s) Administered   • COVID-19 PFIZER VACCINE 0 3 ML IM 03/17/2021, 04/07/2021   • INFLUENZA 12/03/2014, 10/17/2018, 03/16/2021   • Influenza Injectable, MDCK, Preservative Free, Quadrivalent, 0 5 mL 12/20/2019   • Influenza, injectable, quadrivalent, preservative free 0 5 mL 09/22/2020   • Influenza, recombinant, quadrivalent,injectable, preservative free 10/26/2021   • Tdap 09/25/2007, 09/05/2014       Objective     /84 (BP Location: Left arm, Patient Position: Sitting, Cuff Size: Standard)   Pulse 88   Temp 97 6 °F (36 4 °C) (Tympanic)   Ht 5' 4\" (1 626 m)   Wt 92 4 kg (203 lb 12 8 oz)   SpO2 97%   BMI 34 98 kg/m²     Physical Exam  Crystal Portal, DO  "

## 2023-06-13 NOTE — TELEPHONE ENCOUNTER
Upon review of the In Basket request we were able to locate, review, and update the patient chart as requested for Mammogram and Pap Smear (HPV) aka Cervical Cancer Screening  Any additional questions or concerns should be emailed to the Practice Liaisons via the appropriate education email address, please do not reply via In Basket      Thank you  Mary Nava

## 2023-08-21 DIAGNOSIS — F33.1 MAJOR DEPRESSIVE DISORDER, RECURRENT EPISODE, MODERATE (HCC): ICD-10-CM

## 2023-08-21 RX ORDER — ESCITALOPRAM OXALATE 5 MG/1
5 TABLET ORAL DAILY
Qty: 90 TABLET | Refills: 0 | Status: SHIPPED | OUTPATIENT
Start: 2023-08-21

## 2023-08-21 RX ORDER — ESCITALOPRAM OXALATE 10 MG/1
10 TABLET ORAL DAILY
Qty: 90 TABLET | Refills: 0 | Status: SHIPPED | OUTPATIENT
Start: 2023-08-21

## 2023-08-22 DIAGNOSIS — N32.81 OVERACTIVE BLADDER: ICD-10-CM

## 2023-08-22 RX ORDER — TROSPIUM CHLORIDE 20 MG/1
20 TABLET, FILM COATED ORAL 2 TIMES DAILY
Qty: 60 TABLET | Refills: 0 | Status: SHIPPED | OUTPATIENT
Start: 2023-08-22

## 2023-10-02 DIAGNOSIS — N32.81 OVERACTIVE BLADDER: ICD-10-CM

## 2023-10-02 RX ORDER — TROSPIUM CHLORIDE 20 MG/1
20 TABLET, FILM COATED ORAL 2 TIMES DAILY
Qty: 60 TABLET | Refills: 0 | Status: SHIPPED | OUTPATIENT
Start: 2023-10-02

## 2023-11-02 DIAGNOSIS — N32.81 OVERACTIVE BLADDER: ICD-10-CM

## 2023-11-02 RX ORDER — TROSPIUM CHLORIDE 20 MG/1
20 TABLET, FILM COATED ORAL 2 TIMES DAILY
Qty: 60 TABLET | Refills: 0 | Status: SHIPPED | OUTPATIENT
Start: 2023-11-02

## 2023-12-01 DIAGNOSIS — N32.81 OVERACTIVE BLADDER: ICD-10-CM

## 2023-12-01 RX ORDER — TROSPIUM CHLORIDE 20 MG/1
20 TABLET, FILM COATED ORAL 2 TIMES DAILY
Qty: 60 TABLET | Refills: 0 | Status: SHIPPED | OUTPATIENT
Start: 2023-12-01

## 2024-01-06 DIAGNOSIS — N32.81 OVERACTIVE BLADDER: ICD-10-CM

## 2024-01-08 DIAGNOSIS — F33.1 MAJOR DEPRESSIVE DISORDER, RECURRENT EPISODE, MODERATE (HCC): ICD-10-CM

## 2024-01-08 RX ORDER — ESCITALOPRAM OXALATE 5 MG/1
5 TABLET ORAL DAILY
Qty: 90 TABLET | Refills: 0 | OUTPATIENT
Start: 2024-01-08

## 2024-01-08 RX ORDER — ESCITALOPRAM OXALATE 10 MG/1
10 TABLET ORAL DAILY
Qty: 90 TABLET | Refills: 0 | Status: SHIPPED | OUTPATIENT
Start: 2024-01-08 | End: 2024-01-16

## 2024-01-08 RX ORDER — TROSPIUM CHLORIDE 20 MG/1
20 TABLET, FILM COATED ORAL 2 TIMES DAILY
Qty: 180 TABLET | Refills: 1 | Status: SHIPPED | OUTPATIENT
Start: 2024-01-08

## 2024-01-08 RX ORDER — ESCITALOPRAM OXALATE 10 MG/1
10 TABLET ORAL DAILY
Qty: 90 TABLET | Refills: 0 | OUTPATIENT
Start: 2024-01-08

## 2024-01-08 NOTE — TELEPHONE ENCOUNTER
Patient is in need of Lexapro, she has scheduled an appointment with Dr Whalen for 1/16/2024. Can we please refill until scheduled appointment.    Thank you

## 2024-01-16 ENCOUNTER — OFFICE VISIT (OUTPATIENT)
Dept: FAMILY MEDICINE CLINIC | Facility: CLINIC | Age: 59
End: 2024-01-16
Payer: COMMERCIAL

## 2024-01-16 VITALS
SYSTOLIC BLOOD PRESSURE: 140 MMHG | HEART RATE: 89 BPM | DIASTOLIC BLOOD PRESSURE: 88 MMHG | WEIGHT: 205.6 LBS | BODY MASS INDEX: 35.29 KG/M2 | TEMPERATURE: 98.4 F | OXYGEN SATURATION: 99 %

## 2024-01-16 DIAGNOSIS — R73.03 PRE-DIABETES: ICD-10-CM

## 2024-01-16 DIAGNOSIS — E66.9 OBESITY (BMI 30.0-34.9): ICD-10-CM

## 2024-01-16 DIAGNOSIS — Z00.00 ANNUAL PHYSICAL EXAM: Primary | ICD-10-CM

## 2024-01-16 DIAGNOSIS — F33.1 MAJOR DEPRESSIVE DISORDER, RECURRENT EPISODE, MODERATE (HCC): ICD-10-CM

## 2024-01-16 DIAGNOSIS — E55.9 VITAMIN D DEFICIENCY: ICD-10-CM

## 2024-01-16 PROCEDURE — 99214 OFFICE O/P EST MOD 30 MIN: CPT | Performed by: FAMILY MEDICINE

## 2024-01-16 PROCEDURE — 99396 PREV VISIT EST AGE 40-64: CPT | Performed by: FAMILY MEDICINE

## 2024-01-16 RX ORDER — HYDROQUINONE 40 MG/G
CREAM TOPICAL
COMMUNITY
Start: 2023-12-08

## 2024-01-16 RX ORDER — ESCITALOPRAM OXALATE 10 MG/1
10 TABLET ORAL DAILY
Qty: 90 TABLET | Refills: 3 | Status: SHIPPED | OUTPATIENT
Start: 2024-01-16

## 2024-01-16 NOTE — PATIENT INSTRUCTIONS
Wellness Visit for Adults   AMBULATORY CARE:   A wellness visit  is when you see your healthcare provider to get screened for health problems. Your healthcare provider will also give you advice on how to stay healthy. Write down your questions so you remember to ask them. Ask your healthcare provider how often you should have a wellness visit.  What happens at a wellness visit:  Your healthcare provider will ask about your health, and your family history of health problems. This includes high blood pressure, heart disease, and cancer. He or she will ask if you have symptoms that concern you, if you smoke, and about your mood. You may also be asked about your intake of medicines, supplements, food, and alcohol. Any of the following may be done:  Your weight  will be checked. Your height may also be checked so your body mass index (BMI) can be calculated. Your BMI shows if you are at a healthy weight.    Your blood pressure  and heart rate will be checked. Your temperature may also be checked.    Blood and urine tests  may be done. Blood tests may be done to check your cholesterol levels. Abnormal cholesterol levels increase your risk for heart disease and stroke. You may also need a blood or urine test to check for diabetes if you are at increased risk. Urine tests may be done to look for signs of an infection or kidney disease.    A physical exam  includes checking your heartbeat and lungs with a stethoscope. Your healthcare provider may also check your skin to look for sun damage.    Screening tests  may be recommended. A screening test is done to check for diseases that may not cause symptoms. The screening tests you may need depend on your age, gender, family history, and lifestyle habits. For example, colorectal screening may be recommended if you are 50 years old or older.    Screening tests you need if you are a woman:   A Pap smear  is used to screen for cervical cancer. Pap smears are usually done every 3 to  5 years depending on your age. You may need them more often if you have had abnormal Pap smear test results in the past. Ask your healthcare provider how often you should have a Pap smear.    A mammogram  is an x-ray of your breasts to screen for breast cancer. Experts recommend mammograms every 2 years starting at age 50 years. You may need a mammogram at age 49 years or younger if you have an increased risk for breast cancer. Talk to your healthcare provider about when you should start having mammograms and how often you need them.    Vaccines you may need:   Get an influenza vaccine  every year. The influenza vaccine protects you from the flu. Several types of viruses cause the flu. The viruses change over time, so new vaccines are made each year.    Get a tetanus-diphtheria (Td) booster vaccine  every 10 years. This vaccine protects you against tetanus and diphtheria. Tetanus is a severe infection that may cause painful muscle spasms and lockjaw. Diphtheria is a severe bacterial infection that causes a thick covering in the back of your mouth and throat.    Get a human papillomavirus (HPV) vaccine  if you are female and aged 19 to 26 or male 19 to 21 and never received it. This vaccine protects you from HPV infection. HPV is the most common infection spread by sexual contact. HPV may also cause vaginal, penile, and anal cancers.    Get a pneumococcal vaccine  if you are aged 65 years or older. The pneumococcal vaccine is an injection given to protect you from pneumococcal disease. Pneumococcal disease is an infection caused by pneumococcal bacteria. The infection may cause pneumonia, meningitis, or an ear infection.    Get a shingles vaccine  if you are 60 or older, even if you have had shingles before. The shingles vaccine is an injection to protect you from the varicella-zoster virus. This is the same virus that causes chickenpox. Shingles is a painful rash that develops in people who had chickenpox or have  been exposed to the virus.    How to eat healthy:  My Plate is a model for planning healthy meals. It shows the types and amounts of foods that should go on your plate. Fruits and vegetables make up about half of your plate, and grains and protein make up the other half. A serving of dairy is included on the side of your plate. The amount of calories and serving sizes you need depends on your age, gender, weight, and height. Examples of healthy foods are listed below:  Eat a variety of vegetables  such as dark green, red, and orange vegetables. You can also include canned vegetables low in sodium (salt) and frozen vegetables without added butter or sauces.    Eat a variety of fresh fruits , canned fruit in 100% juice, frozen fruit, and dried fruit.    Include whole grains.  At least half of the grains you eat should be whole grains. Examples include whole-wheat bread, wheat pasta, brown rice, and whole-grain cereals such as oatmeal.    Eat a variety of protein foods such as seafood (fish and shellfish), lean meat, and poultry without skin (turkey and chicken). Examples of lean meats include pork leg, shoulder, or tenderloin, and beef round, sirloin, tenderloin, and extra lean ground beef. Other protein foods include eggs and egg substitutes, beans, peas, soy products, nuts, and seeds.    Choose low-fat dairy products such as skim or 1% milk or low-fat yogurt, cheese, and cottage cheese.    Limit unhealthy fats  such as butter, hard margarine, and shortening.       Exercise:  Exercise at least 30 minutes per day on most days of the week. Some examples of exercise include walking, biking, dancing, and swimming. You can also fit in more physical activity by taking the stairs instead of the elevator or parking farther away from stores. Include muscle strengthening activities 2 days each week. Regular exercise provides many health benefits. It helps you manage your weight, and decreases your risk for type 2 diabetes,  heart disease, stroke, and high blood pressure. Exercise can also help improve your mood. Ask your healthcare provider about the best exercise plan for you.       General health and safety guidelines:   Do not smoke.  Nicotine and other chemicals in cigarettes and cigars can cause lung damage. Ask your healthcare provider for information if you currently smoke and need help to quit. E-cigarettes or smokeless tobacco still contain nicotine. Talk to your healthcare provider before you use these products.    Limit alcohol.  A drink of alcohol is 12 ounces of beer, 5 ounces of wine, or 1½ ounces of liquor.    Lose weight, if needed.  Being overweight increases your risk of certain health conditions. These include heart disease, high blood pressure, type 2 diabetes, and certain types of cancer.    Protect your skin.  Do not sunbathe or use tanning beds. Use sunscreen with a SPF 15 or higher. Apply sunscreen at least 15 minutes before you go outside. Reapply sunscreen every 2 hours. Wear protective clothing, hats, and sunglasses when you are outside.    Drive safely.  Always wear your seatbelt. Make sure everyone in your car wears a seatbelt. A seatbelt can save your life if you are in an accident. Do not use your cell phone when you are driving. This could distract you and cause an accident. Pull over if you need to make a call or send a text message.    Practice safe sex.  Use latex condoms if are sexually active and have more than one partner. Your healthcare provider may recommend screening tests for sexually transmitted infections (STIs).    Wear helmets, lifejackets, and protective gear.  Always wear a helmet when you ride a bike or motorcycle, go skiing, or play sports that could cause a head injury. Wear protective equipment when you play sports. Wear a lifejacket when you are on a boat or doing water sports.    © Copyright [employer] 2023 Information is for End User's use only and may not be sold, redistributed or  otherwise used for commercial purposes.  The above information is an  only. It is not intended as medical advice for individual conditions or treatments. Talk to your doctor, nurse or pharmacist before following any medical regimen to see if it is safe and effective for you.    Weight Management   AMBULATORY CARE:   Why it is important to manage your weight:  Being overweight increases your risk of health conditions such as heart disease, high blood pressure, type 2 diabetes, and certain types of cancer. It can also increase your risk for osteoarthritis, sleep apnea, and other respiratory problems. Aim for a slow, steady weight loss. Even a small amount of weight loss can lower your risk of health problems.  Risks of being overweight:  Extra weight can cause many health problems, including the following:  Diabetes (high blood sugar level)    High blood pressure or high cholesterol    Heart disease    Stroke    Gallbladder or liver disease    Cancer of the colon, breast, prostate, liver, or kidney    Sleep apnea    Arthritis or gout    Screening  is done to check for health conditions before you have signs or symptoms. If you are 35 to 70 years old, your blood sugar level may be checked every 3 years for signs of prediabetes or diabetes. Your healthcare provider will check your blood pressure at each visit. High blood pressure can lead to a stroke or other problems. Your provider may check for signs of heart disease, cancer, or other health problems.  How to lose weight safely:  A safe and healthy way to lose weight is to eat fewer calories and get regular exercise.  You can lose up about 1 pound a week by decreasing the number of calories you eat by 500 calories each day. You can decrease calories by eating smaller portion sizes or by cutting out high-calorie foods. Read labels to find out how many calories are in the foods you eat.         You can also burn calories with exercise such as walking,  swimming, or biking. You will be more likely to keep weight off if you make these changes part of your lifestyle. Exercise at least 30 minutes per day on most days of the week. You can also fit in more physical activity by taking the stairs instead of the elevator or parking farther away from stores. Ask your healthcare provider about the best exercise plan for you.       Healthy meal plan for weight management:  A healthy meal plan includes a variety of foods, contains fewer calories, and helps you stay healthy. A healthy meal plan includes the following:     Eat whole-grain foods more often.  A healthy meal plan should contain fiber. Fiber is the part of grains, fruits, and vegetables that is not broken down by your body. Whole-grain foods are healthy and provide extra fiber in your diet. Some examples of whole-grain foods are whole-wheat breads and pastas, oatmeal, brown rice, and bulgur.    Eat a variety of vegetables every day.  Include dark, leafy greens such as spinach, kale, isabel greens, and mustard greens. Eat yellow and orange vegetables such as carrots, sweet potatoes, and winter squash.     Eat a variety of fruits every day.  Choose fresh or canned fruit (canned in its own juice or light syrup) instead of juice. Fruit juice has very little or no fiber.    Eat low-fat dairy foods.  Drink fat-free (skim) milk or 1% milk. Eat fat-free yogurt and low-fat cottage cheese. Try low-fat cheeses such as mozzarella and other reduced-fat cheeses.    Choose meat and other protein foods that are low in fat.  Choose beans or other legumes such as split peas or lentils. Choose fish, skinless poultry (chicken or turkey), or lean cuts of red meat (beef or pork). Before you cook meat or poultry, cut off any visible fat.     Use less fat and oil.  Try baking foods instead of frying them. Add less fat, such as margarine, sour cream, regular salad dressing and mayonnaise to foods. Eat fewer high-fat foods. Some examples of  high-fat foods include french fries, doughnuts, ice cream, and cakes.    Eat fewer sweets.  Limit foods and drinks that are high in sugar. This includes candy, cookies, regular soda, and sweetened drinks.  Ways to decrease calories:   Eat smaller portions.     Use a small plate with smaller servings.    Do not eat second helpings.    When you eat at a restaurant, ask for a box and place half of your meal in the box before you eat.    Share an entrée with someone else.    Replace high-calorie snacks with healthy, low-calorie snacks.     Choose fresh fruit, vegetables, fat-free rice cakes, or air-popped popcorn instead of potato chips, nuts, or chocolate.    Choose water or calorie-free drinks instead of soda or sweetened drinks.    Do not shop for groceries when you are hungry.  You may be more likely to make unhealthy food choices. Take a grocery list of healthy foods and shop after you have eaten.    Eat regular meals. Do not skip meals. Skipping meals can lead to overeating later in the day. This can make it harder for you to lose weight. Eat a healthy snack in place of a meal if you do not have time to eat a regular meal. Talk with a dietitian to help you create a meal plan and schedule that is right for you.    Other things to consider as you try to lose weight:   Be aware of situations that may give you the urge to overeat, such as eating while watching television. Find ways to avoid these situations. For example, read a book, go for a walk, or do crafts.    Meet with a weight loss support group or friends who are also trying to lose weight. This may help you stay motivated to continue working on your weight loss goals.    © Copyright [employer] 2023 Information is for End User's use only and may not be sold, redistributed or otherwise used for commercial purposes.  The above information is an  only. It is not intended as medical advice for individual conditions or treatments. Talk to your doctor,  nurse or pharmacist before following any medical regimen to see if it is safe and effective for you.    Obesity   AMBULATORY CARE:   Obesity  means your body mass index (BMI) is greater than 30. Your healthcare provider will use your age, height, and weight to measure your BMI.  The risks of obesity include  many health problems, including injuries or physical disability.  Diabetes (high blood sugar level)    High blood pressure or high cholesterol    Heart disease or heart failure    Stroke    Gallbladder or liver disease    Cancer of the colon, breast, prostate, liver, or kidney    Sleep apnea    Arthritis or gout    Screening  is done to check for health conditions before you have signs or symptoms. If you are 35 to 70 years old, your blood sugar level may be checked every 3 years for signs of prediabetes or diabetes. Your healthcare provider will check your blood pressure at each visit. High blood pressure can lead to a stroke or other problems. Your provider may check for signs of heart disease, cancer, or other health problems.  Seek care immediately if:   You have a severe headache, confusion, or difficulty speaking.    You have weakness on one side of your body.    You have chest pain, sweating, or shortness of breath.    Call your doctor if:   You have symptoms of gallbladder or liver disease, such as pain in your upper abdomen.    You have knee or hip pain and discomfort while walking.    You have symptoms of diabetes, such as intense hunger and thirst, and frequent urination.    You have symptoms of sleep apnea, such as snoring or daytime sleepiness.    You have questions or concerns about your condition or care.    Treatment for obesity  focuses on helping you lose weight to improve your health. Even a small decrease in BMI can reduce the risk for many health problems. Your healthcare provider will help you set a weight-loss goal.  Lifestyle changes  are the first step in treating obesity. These include  making healthy food choices and getting regular physical activity. Your healthcare provider may suggest a weight-loss program that involves coaching, education, and therapy.    Medicine  may help you lose weight when it is used with a healthy foods and physical activity.    Surgery  can help you lose weight if you have obesity along with other health problems. Several types of weight-loss surgery are available. Ask your healthcare provider for more information.    Tips for safe weight loss:   Set small, realistic goals.  An example of a small goal is to walk for 20 minutes 5 days a week. Anther goal is to lose 5% of your body weight.    Ask for support.  Tell friends, family members, and coworkers about your goals. Ask someone to lose weight with you. You may also want to join a weight-loss support group.    Identify foods or triggers that may cause you to overeat.  Remove tempting high-calorie foods from your home and workplace. Place a bowl of fresh fruit on your kitchen counter. If stress causes you to eat, find other ways to cope with stress. A counselor or therapist may be able to help you.    Track your daily calories and activity.  Write down what you eat and drink. Also write down how many minutes of physical activity you do each day.     Track your weekly weight.  Weigh yourself in the morning, before you eat or drink anything but after you use the bathroom. Use the same scale, in the same place, and in similar clothing each time. Only weigh yourself 1 to 2 times each week, or as directed. You may become discouraged if you weigh yourself every day.    Eating changes:  You will need to eat 500 to 1,000 fewer calories each day than you currently eat to lose 1 to 2 pounds a week. The following changes will help you cut calories:  Eat smaller portions.  Use small plates, no larger than 9 inches in diameter. Fill your plate half full of fruits and vegetables. Measure your food using measuring cups until you know  what a serving size looks like.         Eat 3 meals and 1 or 2 snacks each day.  Plan your meals in advance. Cook and eat at home most of the time. Eat slowly. Do not skip meals. Skipping meals can lead to overeating later in the day. This can make it harder for you to lose weight. Talk with a dietitian to help you make a meal plan and schedule that is right for you.    Eat fruits and vegetables at every meal.  They are low in calories and high in fiber, which makes you feel full. Do not add butter, margarine, or cream sauce to vegetables. Use herbs to season steamed vegetables.    Eat less fat and fewer fried foods.  Eat more baked or grilled chicken and fish. These protein sources are lower in calories and fat than red meat. Limit fast food. Dress your salads with olive oil and vinegar instead of bottled dressing.    Limit the amount of sugar you eat.  Do not drink sugary beverages. Limit alcohol.       Activity changes:  Physical activity is good for your body in many ways. It helps you burn calories and build strong muscles. It decreases stress and depression, and improves your mood. It can also help you sleep better. Talk to your healthcare provider before you begin an exercise program.  Exercise for at least 30 minutes 5 days a week.  Start slowly. Set aside time each day for physical activity that you enjoy and that is convenient for you. It is best to do both weight training and an activity that increases your heart rate, such as walking, bicycling, or swimming.            Find ways to be more active.  Do yard work and housecleaning. Walk up the stairs instead of using elevators. Spend your leisure time going to events that require walking, such as outdoor festivals or fairs. This extra physical activity can help you lose weight and keep it off.       Follow up with your doctor as directed:  You may need to meet with a dietitian. Write down your questions so you remember to ask them during your visits.  ©  Copyright [employer] 2023 Information is for End User's use only and may not be sold, redistributed or otherwise used for commercial purposes.  The above information is an  only. It is not intended as medical advice for individual conditions or treatments. Talk to your doctor, nurse or pharmacist before following any medical regimen to see if it is safe and effective for you.

## 2024-01-16 NOTE — PROGRESS NOTES
ADULT ANNUAL PHYSICAL  [facility]    NAME: Chela Apodaca  AGE: 58 y.o. SEX: female  : 1965     DATE: 2024     Assessment and Plan:     Problem List Items Addressed This Visit     Major depressive disorder, recurrent episode, moderate (HCC)    Relevant Medications    Semaglutide-Weight Management (WEGOVY) 0.25 MG/0.5ML    escitalopram (LEXAPRO) 10 mg tablet    Obesity (BMI 30.0-34.9)    Relevant Medications    Semaglutide-Weight Management (WEGOVY) 0.25 MG/0.5ML    Other Relevant Orders    Lipid panel    CBC and Platelet    Comprehensive metabolic panel    Vitamin D 25 hydroxy    TSH, 3rd generation    Hemoglobin A1C    Vitamin D deficiency    Relevant Orders    Vitamin D 25 hydroxy   Other Visit Diagnoses     Annual physical exam    -  Primary    Relevant Orders    Lipid panel    CBC and Platelet    Comprehensive metabolic panel    Vitamin D 25 hydroxy    TSH, 3rd generation    Hemoglobin A1C    Pre-diabetes        Relevant Medications    Semaglutide-Weight Management (WEGOVY) 0.25 MG/0.5ML    BMI 35.0-35.9,adult        Relevant Medications    Semaglutide-Weight Management (WEGOVY) 0.25 MG/0.5ML          Immunizations and preventive care screenings were discussed with patient today. Appropriate education was printed on patient's after visit summary.    Counseling:  Alcohol/drug use: discussed moderation in alcohol intake, the recommendations for healthy alcohol use, and avoidance of illicit drug use.  Dental Health: discussed importance of regular tooth brushing, flossing, and dental visits.  Injury prevention: discussed safety/seat belts, safety helmets, smoke detectors, carbon dioxide detectors, and smoking near bedding or upholstery.  Sexual health: discussed sexually transmitted diseases, partner selection, use of condoms, avoidance of unintended pregnancy, and contraceptive alternatives.  Exercise: the importance of regular  exercise/physical activity was discussed. Recommend exercise 3-5 times per week for at least 30 minutes.          Return in 3 months (on 4/16/2024).     Chief Complaint:     Chief Complaint   Patient presents with   • Physical Exam     Pt is interested in weight loss.       History of Present Illness:     Adult Annual Physical   Patient here for a comprehensive physical exam. The patient reports problems - see list .    Diet and Physical Activity  Diet/Nutrition: poor diet.   Exercise: no formal exercise.      Depression Screening  PHQ-2/9 Depression Screening         General Health  Sleep: sleeps well.   Hearing: normal - bilateral.  Vision: no vision problems.   Dental: regular dental visits.       /GYN Health  Follows with gynecology? yes   Patient is: perimenopausal  Last menstrual period: na  Contraceptive method:  na .    Advanced Care Planning  Do you have an advanced directive? no  Do you have a durable medical power of ? no     Review of Systems:     Review of Systems   Constitutional: Negative.    HENT: Negative.     Eyes: Negative.    Respiratory: Negative.     Cardiovascular: Negative.    Gastrointestinal: Negative.    Endocrine: Negative.    Genitourinary: Negative.    Musculoskeletal: Negative.    Skin: Negative.    Allergic/Immunologic: Negative.    Neurological: Negative.    Hematological: Negative.    Psychiatric/Behavioral: Negative.     All other systems reviewed and are negative.     Past Medical History:     Past Medical History:   Diagnosis Date   • Anxiety    • Depression    • Incontinence    • MVP (mitral valve prolapse)    • Obesity    • Sleep apnea     mild to moderate, had cpap device but was recalled, not replaced yet   • Urinary tract infection       Past Surgical History:     Past Surgical History:   Procedure Laterality Date   • APPENDECTOMY     • COLONOSCOPY     • DILATION AND CURETTAGE OF UTERUS     • HAND SURGERY     • KELOID EXCISION     • KELOID EXCISION Right     arm    • NO PAST SURGERIES     • WISDOM TOOTH EXTRACTION        Social History:     Social History     Socioeconomic History   • Marital status: /Civil Union     Spouse name: None   • Number of children: None   • Years of education: None   • Highest education level: None   Occupational History   • Occupation: working    Tobacco Use   • Smoking status: Never   • Smokeless tobacco: Never   Vaping Use   • Vaping status: Never Used   Substance and Sexual Activity   • Alcohol use: Yes     Comment: rare socially   • Drug use: No   • Sexual activity: Yes     Partners: Male     Birth control/protection: None   Other Topics Concern   • None   Social History Narrative   • None     Social Determinants of Health     Financial Resource Strain: Not on file   Food Insecurity: Not on file   Transportation Needs: Not on file   Physical Activity: Not on file   Stress: Not on file   Social Connections: Not on file   Intimate Partner Violence: Not on file   Housing Stability: Not on file      Family History:     Family History   Problem Relation Age of Onset   • Hyperlipidemia Mother    • Glaucoma Mother    • Heart disease Father    • Thyroid disease Sister    • Cancer Paternal Grandmother         Bladder CA   • Diabetes Paternal Grandfather    • Angina Paternal Grandfather    • Hypertension Neg Hx    • Stroke Neg Hx       Current Medications:     Current Outpatient Medications   Medication Sig Dispense Refill   • escitalopram (LEXAPRO) 10 mg tablet Take 1 tablet (10 mg total) by mouth daily 90 tablet 3   • hydroquinone 4 % cream      • Semaglutide-Weight Management (WEGOVY) 0.25 MG/0.5ML Inject 0.25 mg under skin once a week for 4 weeks.  Then inject 0.5 mg under the skin once a week for 4 weeks. 2 mL 1   • trospium chloride (SANCTURA) 20 mg tablet Take 1 tablet (20 mg total) by mouth 2 (two) times a day 180 tablet 1   • Tri-Tiffany 0.01-4-0.05 % CREA  (Patient not taking: Reported on 1/16/2024)       No current facility-administered  medications for this visit.      Allergies:     No Known Allergies   Physical Exam:     /88 (BP Location: Left arm, Patient Position: Sitting, Cuff Size: Standard)   Pulse 89   Temp 98.4 °F (36.9 °C) (Temporal)   Wt 93.3 kg (205 lb 9.6 oz)   SpO2 99%   BMI 35.29 kg/m²     Physical Exam  Vitals and nursing note reviewed.   Constitutional:       Appearance: She is well-developed.   HENT:      Head: Normocephalic.   Eyes:      Pupils: Pupils are equal, round, and reactive to light.   Cardiovascular:      Rate and Rhythm: Normal rate and regular rhythm.      Heart sounds: Normal heart sounds.   Pulmonary:      Effort: Pulmonary effort is normal.      Breath sounds: Normal breath sounds.   Abdominal:      General: Bowel sounds are normal.      Palpations: Abdomen is soft.   Musculoskeletal:      Cervical back: Normal range of motion and neck supple.   Skin:     General: Skin is warm and dry.      Capillary Refill: Capillary refill takes less than 2 seconds.   Neurological:      General: No focal deficit present.      Mental Status: She is alert and oriented to person, place, and time.   Psychiatric:         Mood and Affect: Mood normal.         Behavior: Behavior normal.          Rex Whalen,   [facility]

## 2024-04-03 ENCOUNTER — CONSULT (OUTPATIENT)
Dept: NEUROLOGY | Facility: CLINIC | Age: 59
End: 2024-04-03
Payer: COMMERCIAL

## 2024-04-03 VITALS — SYSTOLIC BLOOD PRESSURE: 122 MMHG | DIASTOLIC BLOOD PRESSURE: 64 MMHG | HEART RATE: 80 BPM

## 2024-04-03 DIAGNOSIS — R41.3 SHORT-TERM MEMORY LOSS: Primary | ICD-10-CM

## 2024-04-03 DIAGNOSIS — R25.1 EXCESSIVE PHYSIOLOGIC TREMOR: ICD-10-CM

## 2024-04-03 PROCEDURE — 99204 OFFICE O/P NEW MOD 45 MIN: CPT | Performed by: PSYCHIATRY & NEUROLOGY

## 2024-04-03 NOTE — PROGRESS NOTES
Patient ID: Chela Apodaca is a 58 y.o. female.    Assessment/Plan:    #Enhanced physiologic tremor  #Short-term memory loss  Chela is a very pleasant 59YO R-handed F who presents for evaluation of intermittent involuntary motor movements as well as short-term memory loss.     Has noticed tremors for several years now, particularly in both hands. Can occur at rest, postural, and/or with action. Appears to resolve with drinking water or eating food. No family history of tremors or Parkinson's disease. Neurological examination unremarkable with exception of mild physiologic tremor b/l UE symmetric.    Also concern for short-term memory loss for a couple years now. Issues with word-finding difficulty and short-term recall, such as remembering what she did the day prior. Impacting work; she is a . No family history of dementia. MOCA 30/30 during today's office visit. B12 503 in 2019. Has history of sleep apnea, but does not use cpap machine. Avg sleep 6 hours a night.    Assessment:  Enhanced physiologic tremor, possibly associated with dehydration or metabolic disturbance requiring food intake.  Short-term memory issues, possibly associated with sleep apnea.    Plan:  No medication changes at this time.   Fluid hydration recommendation min 64oz of water daily. Regular meals and occasional snacking as needed.  Order blood work: vitamin B12 and MMA  Follow up with sleep medicine. Recommend CPAP machine.  Follow up as needed.     Diagnoses and all orders for this visit:    Short-term memory loss  -     Vitamin B12; Future  -     Methylmalonic acid, serum; Future    Excessive physiologic tremor  -     Ambulatory Referral to Neurology           Subjective:    HPI  Chela Apodaca is a 58-year-old R-handed female with history of MDD, vitamin D deficiency, hyperlipidemia who presents as a new patient to [facility] movement disorder clinic for evaluation of involuntary muscle movements.  Question is unsure  if her tremors are related to Lexapro 10 mg a day versus essential tremors.    Today, reports she inconsistently has tremors in her hands and sometimes feels tremulous throughout her body, but has not had tremors in a while.    Onset- several years ago, occurred periodically..  Went away when she ate or drank something.  When this appt was made about 8 months ago, tremors were occurring more frequently for about 2-3 months.  Last occurrence: 1x a few weeks ago. Drank water and it went away.  When tremors occurred, appeared to happen all the time (rest, postural, action), b/l UE.  No alcohol history.  No family hx of tremors, dementia, Parkinson's disease.    2 cups of coffee a day.    Short-term memory is an issue for a couple of years - worse in the last few months. Can't remember what she did yesterday morning.    Sleep apnea, but not using the machine.  Avg 6 hours. Waking up refreshed.    Regular meals TID - varied: protein, fruits, vegetables.    MOCA 30/30, version 7.2.    Current meds: Lexapro 10mg daily (started 15mg 8 years, 10mg about 1 year ago), Sanctura 20mg BID, has not started Semaglutide    HR 80    Lab Results   Component Value Date    [id number] 503 09/14/2019     Prior workup reviewed:  5/4/23 - A1c 6.2% (prediabetic), vit D 27 (low), TSH WNL 3.94, CBCD/CMP WNL, lipid panel - chol 219, HDL 62, trig 130,       The following portions of the patient's history were reviewed and updated as appropriate: allergies, current medications, past family history, past medical history, past social history, past surgical history, and problem list.         Objective:    Blood pressure 122/64, pulse 80.    Physical Exam   Vitals reviewed.   Constitutional:    Not in acute distress. Normal appearance. Not ill-appearing, toxic-appearing or diaphoretic.   HENT:   Normocephalic and atraumatic.  External ear normal b/l. Nose normal. No congestion or rhinorrhea. Mucous membranes are moist.  Oropharynx is clear. No  oropharyngeal exudate or posterior oropharyngeal erythema.   Eyes:    No scleral icterus.  No discharge b/l.  Conjunctivae normal.   Cardiovascular: no clear edema  Pulmonary:  No respiratory distress.   Musculoskeletal: no gross deformities  Skin:    Skin is not pale.   Psychiatric:      Mood normal. Affect congruent    Neurologic Exam   MENTAL STATUS: AAOx3. Follows simple/complex commands. Affect normal w/ congruent mood.    LANGUAGE: Speech clear, spontaneous, and fluent. No aphasia -  naming, repetition, comprehension, reading, writing intact. No dysarthria - normal volume and intonation.    CRANIAL NERVES:  CN II: Visual acuity grossly intact. No visual field deficit. PERRLA.   CN III, IV, VI: EOM's intact w/o nystagmus, gaze palsy, or preference.   CN V: Normal masseter bulk. V1-V3 sensation intact and symmetric bilaterally.   CN VII: Face movement symmetric. Smile, eyebrow raise, eyelid bury symmetric. Nasolabial folds and palpebral folds symmetric.   CN VIII: Hearing grossly intact bilaterally.   CN IX-X: No dysarthria. Palate elevates symmetrically. Uvula midline.   CN XI: Shoulder shrug symmetric.   CN XII: Tongue midline. No deviation, atrophy, or fasciculations.    MOTOR: Normal muscle bulk. Normal tone. Mild physiologic tremor b/l UE. Formal strength testing as follows:  Upper extremity:   Shoulder abduction Elbow flexion Elbow extension   Right 5/5 5/5 5/5   Left 5/5 5/5 5/5     Lower extremity:   Hip flexion Knee flexion Knee extension Ankle dorsiflexion Ankle plantarflexion   Right 5/5 5/5 5/5 5/5 5/5   Left 5/5 5/5 5/5 5/5 5/5     SENSORY:  Light touch: Intact and symmetric throughout.    REFLEXES:   Biceps Triceps Brachioradialis Patellar   Right 2+ 2+ 2+ 2+   Left 2+ 2+ 2+ 2+     COORDINATION: Finger-to-nose w/ eyes open intact bilaterally w/o dysmetria or ataxia. Heel-to-shin intact bilaterally w/o ataxia. SABAS (finger-tapping, open/close fist, toe-tapping) intact bilaterally. No  dysdiadochokinesia. No truncal ataxia. Romberg negative.    GAIT: Gait stable with normal stride and step height. Sways while ambulating, R > L.   Tandem gait, toe walking, heel walking stable.    ROS:    Review of Systems   Constitutional:  Negative for appetite change, fatigue and fever.   HENT: Negative.  Negative for hearing loss, tinnitus, trouble swallowing and voice change.    Eyes: Negative.  Negative for photophobia, pain and visual disturbance.   Respiratory: Negative.  Negative for shortness of breath.    Cardiovascular: Negative.  Negative for palpitations.   Gastrointestinal: Negative.  Negative for nausea and vomiting.   Endocrine: Negative.  Negative for cold intolerance.   Genitourinary: Negative.  Negative for dysuria, frequency and urgency.   Musculoskeletal:  Positive for myalgias. Negative for back pain, gait problem, neck pain and neck stiffness.   Skin: Negative.  Negative for rash.   Allergic/Immunologic: Negative.    Neurological:  Positive for tremors (Inconsistent in hands , but states she feels it in her body, states she hasnt had for a little bit). Negative for dizziness, seizures, syncope, facial asymmetry, speech difficulty, weakness, light-headedness, numbness and headaches.   Hematological:  Bruises/bleeds easily (Bruise, ongoing).   Psychiatric/Behavioral:  Negative for confusion, hallucinations and sleep disturbance.         Short term Memory   All other systems reviewed and are negative.    Aleksandra Adler DO  [facility]'s Neurology Resident, PGY2

## 2024-04-03 NOTE — PATIENT INSTRUCTIONS
No medication changes at this time.   Fluid hydration recommendation min 64oz of water daily. Regular meals and occasional snacking as needed.  Order blood work: vitamin B12 and MMA  Follow up with sleep medicine. Recommend CPAP machine.  Follow up as needed.

## 2024-04-29 ENCOUNTER — TELEPHONE (OUTPATIENT)
Dept: NEUROLOGY | Facility: CLINIC | Age: 59
End: 2024-04-29

## 2024-04-29 NOTE — TELEPHONE ENCOUNTER
Hello,     Patient is asking for a call back regarding if she should fast or not for the Labs ordered by .    Call Back # [phone]    Thank you,     Tessa

## 2024-04-30 NOTE — TELEPHONE ENCOUNTER
Called pt. Left her a detailed message making her aware that she should fast prior to the lab draw.

## 2024-05-08 ENCOUNTER — TELEPHONE (OUTPATIENT)
Age: 59
End: 2024-05-08

## 2024-05-08 DIAGNOSIS — E66.9 OBESITY (BMI 30.0-34.9): Primary | ICD-10-CM

## 2024-05-08 NOTE — TELEPHONE ENCOUNTER
Patient reports she is not able to find Wegovy anywhere and would like to know if she could have Mounjaro prescribed instead. Patient would like a call back to advise.

## 2024-05-09 NOTE — TELEPHONE ENCOUNTER
Patient returned call stating that she hasn't scheduled a follow up because she has not been able to get the Wegovy at all . States that on her Wegovy order she was marked as pre diabetic and is wondering if that would work to cover Mounjaro , Patient is requesting call back . Please advise .

## 2024-05-15 ENCOUNTER — APPOINTMENT (OUTPATIENT)
Dept: LAB | Facility: CLINIC | Age: 59
End: 2024-05-15
Payer: COMMERCIAL

## 2024-05-15 DIAGNOSIS — Z00.00 ANNUAL PHYSICAL EXAM: ICD-10-CM

## 2024-05-15 DIAGNOSIS — E66.9 OBESITY (BMI 30.0-34.9): ICD-10-CM

## 2024-05-15 DIAGNOSIS — R41.3 SHORT-TERM MEMORY LOSS: ICD-10-CM

## 2024-05-15 DIAGNOSIS — E55.9 VITAMIN D DEFICIENCY: ICD-10-CM

## 2024-05-15 LAB — VIT B12 SERPL-MCNC: 307 PG/ML (ref 180–914)

## 2024-05-15 PROCEDURE — 36415 COLL VENOUS BLD VENIPUNCTURE: CPT

## 2024-05-15 PROCEDURE — 83918 ORGANIC ACIDS TOTAL QUANT: CPT

## 2024-05-15 PROCEDURE — 82607 VITAMIN B-12: CPT

## 2024-05-16 DIAGNOSIS — E53.8 VITAMIN B12 DEFICIENCY: Primary | ICD-10-CM

## 2024-05-20 LAB — METHYLMALONATE SERPL-SCNC: 263 NMOL/L (ref 0–378)

## 2024-07-08 DIAGNOSIS — N32.81 OVERACTIVE BLADDER: ICD-10-CM

## 2024-07-09 RX ORDER — TROSPIUM CHLORIDE 20 MG/1
20 TABLET, FILM COATED ORAL 2 TIMES DAILY
Qty: 60 TABLET | Refills: 5 | Status: SHIPPED | OUTPATIENT
Start: 2024-07-09

## 2025-01-09 ENCOUNTER — OFFICE VISIT (OUTPATIENT)
Dept: UROLOGY | Facility: AMBULATORY SURGERY CENTER | Age: 60
End: 2025-01-09
Payer: COMMERCIAL

## 2025-01-09 VITALS
DIASTOLIC BLOOD PRESSURE: 82 MMHG | SYSTOLIC BLOOD PRESSURE: 118 MMHG | RESPIRATION RATE: 20 BRPM | HEART RATE: 68 BPM | OXYGEN SATURATION: 98 %

## 2025-01-09 DIAGNOSIS — N39.3 STRESS INCONTINENCE: Primary | ICD-10-CM

## 2025-01-09 LAB
BACTERIA UR QL AUTO: ABNORMAL /HPF
BILIRUB UR QL STRIP: NEGATIVE
CLARITY UR: CLEAR
COLOR UR: ABNORMAL
GLUCOSE UR STRIP-MCNC: NEGATIVE MG/DL
HGB UR QL STRIP.AUTO: NEGATIVE
KETONES UR STRIP-MCNC: NEGATIVE MG/DL
LEUKOCYTE ESTERASE UR QL STRIP: NEGATIVE
MUCOUS THREADS UR QL AUTO: ABNORMAL
NITRITE UR QL STRIP: NEGATIVE
NON-SQ EPI CELLS URNS QL MICRO: ABNORMAL /HPF
PH UR STRIP.AUTO: 5.5 [PH]
POST-VOID RESIDUAL VOLUME, ML POC: 0 ML
PROT UR STRIP-MCNC: NEGATIVE MG/DL
RBC #/AREA URNS AUTO: ABNORMAL /HPF
SL AMB  POCT GLUCOSE, UA: NORMAL
SL AMB LEUKOCYTE ESTERASE,UA: NORMAL
SL AMB POCT BILIRUBIN,UA: NORMAL
SL AMB POCT BLOOD,UA: NORMAL
SL AMB POCT CLARITY,UA: CLEAR
SL AMB POCT COLOR,UA: YELLOW
SL AMB POCT KETONES,UA: NORMAL
SL AMB POCT NITRITE,UA: NORMAL
SL AMB POCT PH,UA: 6
SL AMB POCT SPECIFIC GRAVITY,UA: 1.01
SL AMB POCT URINE PROTEIN: NORMAL
SL AMB POCT UROBILINOGEN: 0.2
SP GR UR STRIP.AUTO: 1.02 (ref 1–1.03)
UROBILINOGEN UR STRIP-ACNC: <2 MG/DL
WBC #/AREA URNS AUTO: ABNORMAL /HPF

## 2025-01-09 PROCEDURE — 51798 US URINE CAPACITY MEASURE: CPT

## 2025-01-09 PROCEDURE — 81002 URINALYSIS NONAUTO W/O SCOPE: CPT

## 2025-01-09 PROCEDURE — 99213 OFFICE O/P EST LOW 20 MIN: CPT

## 2025-01-09 PROCEDURE — 81001 URINALYSIS AUTO W/SCOPE: CPT

## 2025-01-09 NOTE — PROGRESS NOTES
Office Visit- Urology  Chela Apodaca 1965 MRN: [id number]      Assessment/Discussion/Plan    59 y.o. female managed by     Urinary incontinence  -Patient has been maintained on trospium 20 mg.  -PVR today is 0 mL  -In conversation with patient today about her symptoms she predominately describes stress urinary incontinence.  I informed her that the trospium would not be effective for this.  She cannot recall what her symptoms were like before she was on trospium and she has been on this for a number of years.  We discussed trialing a few weeks off of trospium to see if she has any exacerbation.  She has symptoms such as frequency, urgency, urge incontinence.  If she does she can resume use of trospium.  If there is no difference in her urinary pattern she can stay off of it  -Discussed interventions for stress urinary incontinence.  Gave referral to pelvic floor physical therapy.  Patient not interested in possible surgical procedures at this point in time  -We will reassess her symptomatology in about 4 months after completion of physical therapy    2.  Microscopic hematuria  -Patient previously with a history of microscopic hematuria.  Her recent urinalyses do not demonstrate any  clinically significant microscopic hematuria.  She has not had a microscopy within the past year.  Will obtain an updated microscopy to evaluate for any AUA defined clinically significant microscopic hematuria    Chief Complaint:   Chela is a 59 y.o. female presenting to the office for a follow up visit regarding urinary incontinence patient is a 59-year-old female with a past urologic history of urinary incontinence.  She was last seen in the office in        Subjective     patient is a 59-year-old female with a past urologic history of urinary incontinence.  She was last seen in the office in 2022.  She has been maintained on trospium 20 mg twice daily for what has been described urgency urinary incontinence.  She returns to  the office today for to reestablish care and for evaluation of her urinary incontinence.  In conversation today it seems that patient is experiencing predominantly stress urinary incontinence she largely denies sensation of frequency, urgency or urge incontinence.  However she states that she does not know/remember how her symptoms were before the trospium.  She would be interested in trying to come off the medication to see if there is any change in urinary pattern.  She is interested in pelvic floor physical therapy for stress urinary incontinence.  She denies a sensation of a vaginal bulge        ROS:   Review of Systems   Constitutional: Negative.  Negative for chills, fatigue and fever.   HENT: Negative.     Respiratory:  Negative for shortness of breath.    Cardiovascular:  Negative for chest pain.   Gastrointestinal: Negative.  Negative for abdominal pain.   Endocrine: Negative.    Musculoskeletal: Negative.    Skin: Negative.    Neurological: Negative.  Negative for dizziness and light-headedness.   Hematological: Negative.    Psychiatric/Behavioral: Negative.           Past Medical History  Past Medical History:   Diagnosis Date    Anxiety     Depression     Incontinence     MVP (mitral valve prolapse)     Obesity     Sleep apnea     mild to moderate, had cpap device but was recalled, not replaced yet    Urinary tract infection        Past Surgical History  Past Surgical History:   Procedure Laterality Date    APPENDECTOMY      COLONOSCOPY      DILATION AND CURETTAGE OF UTERUS      HAND SURGERY      KELOID EXCISION      KELOID EXCISION Right     arm    NO PAST SURGERIES      WISDOM TOOTH EXTRACTION         Past Family History  Family History   Problem Relation Age of Onset    Hyperlipidemia Mother     Glaucoma Mother     Heart disease Father     Thyroid disease Sister     Cancer Paternal Grandmother         Bladder CA    Diabetes Paternal Grandfather     Angina Paternal Grandfather     Hypertension Neg Hx      Stroke Neg Hx        Past Social history  Social History     Socioeconomic History    Marital status: /Civil Union     Spouse name: Not on file    Number of children: Not on file    Years of education: Not on file    Highest education level: Not on file   Occupational History    Occupation: working    Tobacco Use    Smoking status: Never    Smokeless tobacco: Never   Vaping Use    Vaping status: Never Used   Substance and Sexual Activity    Alcohol use: Yes     Comment: rare socially    Drug use: No    Sexual activity: Yes     Partners: Male     Birth control/protection: None   Other Topics Concern    Not on file   Social History Narrative    Not on file     Social Drivers of Health     Financial Resource Strain: Not on file   Food Insecurity: Not on file   Transportation Needs: Not on file   Physical Activity: Not on file   Stress: Not on file   Social Connections: Not on file   Intimate Partner Violence: Not on file   Housing Stability: Not on file       Current Medications  Current Outpatient Medications   Medication Sig Dispense Refill    cyanocobalamin (VITAMIN B-12) 1000 MCG tablet Take 1 tablet (1,000 mcg total) by mouth daily 30 tablet 5    escitalopram (LEXAPRO) 10 mg tablet Take 1 tablet (10 mg total) by mouth daily 90 tablet 3    hydroquinone 4 % cream       trospium chloride (SANCTURA) 20 mg tablet Take 1 tablet (20 mg total) by mouth 2 (two) times a day 60 tablet 5    Semaglutide-Weight Management (WEGOVY) 0.25 MG/0.5ML Inject 0.25 mg under skin once a week for 4 weeks.  Then inject 0.5 mg under the skin once a week for 4 weeks. (Patient not taking: Reported on 4/3/2024) 2 mL 1     No current facility-administered medications for this visit.       Allergies  No Known Allergies    OBJECTIVE    Vitals   Vitals:    01/09/25 1041   BP: 118/82   Pulse: 68   Resp: 20   SpO2: 98%       PVR:    Physical Exam  Constitutional:       General: She is not in acute distress.     Appearance: Normal  appearance. She is normal weight. She is not ill-appearing or toxic-appearing.   HENT:      Head: Normocephalic and atraumatic.   Eyes:      Conjunctiva/sclera: Conjunctivae normal.   Cardiovascular:      Rate and Rhythm: Normal rate.   Pulmonary:      Effort: Pulmonary effort is normal. No respiratory distress.   Skin:     General: Skin is warm and dry.   Neurological:      General: No focal deficit present.      Mental Status: She is alert and oriented to person, place, and time.      Cranial Nerves: No cranial nerve deficit.   Psychiatric:         Mood and Affect: Mood normal.         Behavior: Behavior normal.         Thought Content: Thought content normal.          Labs:     Lab Results   Component Value Date    CREATININE 0.68 05/04/2023      Lab Results   Component Value Date    HGBA1C 6.2 (H) 05/04/2023     Lab Results   Component Value Date    GLUCOSE 79 01/31/2014    CALCIUM 9.2 05/04/2023     06/14/2016    K 4.5 05/04/2023    CO2 27 05/04/2023     05/04/2023    BUN 14 05/04/2023    CREATININE 0.68 05/04/2023       I have personally reviewed all pertinent lab results and reviewed with patient    Imaging       Mart Montoya PA-C  Date: 1/9/2025 Time: 10:46 AM  [facility]    This note was written using fluency dictation software. Please excuse any resulting minor grammatical errors.

## 2025-01-10 ENCOUNTER — RESULTS FOLLOW-UP (OUTPATIENT)
Dept: UROLOGY | Facility: CLINIC | Age: 60
End: 2025-01-10

## 2025-01-10 NOTE — TELEPHONE ENCOUNTER
I was able to call and speak with patient to let her know that there is no microscopic blood in her urine sample.  She can reach out on [facility] or by phone call to let us know how the trial without medication goes. Patient understood everything.      ----- Message from Mart Montoya PA-C sent at 1/10/2025  9:05 AM EST -----  Please call patient to inform her that there is no microscopic blood in her urine sample.  She can reach out on [facility] or by phone call to let us know how the trial without medication goes

## 2025-01-28 DIAGNOSIS — N32.81 OVERACTIVE BLADDER: ICD-10-CM

## 2025-01-28 RX ORDER — TROSPIUM CHLORIDE 20 MG/1
20 TABLET, FILM COATED ORAL 2 TIMES DAILY
Qty: 60 TABLET | Refills: 5 | Status: SHIPPED | OUTPATIENT
Start: 2025-01-28

## 2025-01-28 NOTE — TELEPHONE ENCOUNTER
Patient states discussed with provider about stopping medication to see if it would work. Patient states she did stop the medication for a short period of time, and it did not go well. Patient is requesting to start medication again and have new script sent into [facility] pharmacy. Patient ok with phone call to further discuss if needed.     Reason for call:   [x] Refill   [] Prior Auth  [] Other:     Office:   [] PCP/Provider -   [x] Specialty/Provider - Urology    Medication: trospium chloride (SANCTURA) 20 mg     Dose/Frequency: Take 1 tablet (20 mg total) by mouth 2 (two) times a day     Quantity: 60    Pharmacy: [facility] [place] - [address]     Does the patient have enough for 3 days?   [x] Yes   [] No - Send as HP to POD'

## 2025-02-05 ENCOUNTER — EVALUATION (OUTPATIENT)
Dept: PHYSICAL THERAPY | Facility: CLINIC | Age: 60
End: 2025-02-05
Payer: COMMERCIAL

## 2025-02-05 DIAGNOSIS — N39.3 STRESS INCONTINENCE: ICD-10-CM

## 2025-02-05 DIAGNOSIS — N81.89 PELVIC FLOOR WEAKNESS IN FEMALE: Primary | ICD-10-CM

## 2025-02-05 PROCEDURE — 97162 PT EVAL MOD COMPLEX 30 MIN: CPT | Performed by: PHYSICAL THERAPIST

## 2025-02-05 PROCEDURE — 97140 MANUAL THERAPY 1/> REGIONS: CPT | Performed by: PHYSICAL THERAPIST

## 2025-02-05 PROCEDURE — 97530 THERAPEUTIC ACTIVITIES: CPT | Performed by: PHYSICAL THERAPIST

## 2025-02-05 NOTE — PROGRESS NOTES
PT Evaluation     Today's date: 2025  Patient name: Chela Apodaca  : 1965  MRN: [id number]  Referring provider: Mart Montoya P*  Dx:   Encounter Diagnosis     ICD-10-CM    1. Pelvic floor weakness in female  N81.89       2. Stress incontinence  N39.3 Ambulatory Referral to Physical Therapy          Start Time: 745  Stop Time: 0845  Total time in clinic (min): 60 minutes    Assessment  Impairments: abnormal muscle firing, abnormal muscle tone, abnormal or restricted ROM, impaired physical strength, lacks appropriate home exercise program, poor posture , participation limitations and activity limitations    Assessment details: Chela Apodaca is a 59 y.o. year-old female who presents with reports of leakage/incontinence with stress related activities such as coughing, sneezing, or other extreme/sudden activities or movements. Internal assessment of pelvic floor muscles revealed weakness at 3rd layer especially with decreased endurance and decreased fast flick reps noted. This is affecting overall pelvic floor function in order to maintain proper support of visceral structures and possibly contributing to her stress incontinence that happens on occasion. Bladder diary provided to patient today to fill out to review other personal and external factors that may be contributing to her current complaints at this time. She will benefit from skilled PT for pelvic floor rehab to improve PFM and core strength, improve proper breathing mechanics, improve relaxation of PFM and glut musculature, and improve overall quality of life. Patient would benefit from skilled PT services to address these impairments and to maximize function.  Thank you for the referral.     Understanding of Dx/Px/POC: good     Prognosis: good    Goals  Impairment Goals 4-6 weeks   In order to maximize function patient will be able to...   - Patient will report reduced leakage with daily activities such as coughing, sneezing, etc.   -  Improve PERF score to WNLs to improve PFM contraction with ADLs  - Increase core and PFM strength to 4/5 throughout to maintain proper stability and support for visceral structures  - Increase hip strength to 4/5 throughout to improve PFM and overall pelvic stability with ADLs  - Demonstrate improved hip flexibility as demonstrated by increased ROM through therapeutic exercise    Functional Goals 6-8 weeks  In order to return to prior level of function patient will be able to...   - Demonstrate independence and compliant with HEP  - Demonstrate functional activities with good core/glute/PF muscle strength without compensation/pain/difficulty   - Demonstrate a squat and or sit to stand with good mechanics and eccentric control without pain/difficulty/leakage  - Ascend and descend stairs without increased pain/difficulty/leakage and a reciprocal gait pattern.    Plan  Patient would benefit from: skilled physical therapy  Planned modality interventions: cryotherapy, electrical stimulation/Russian stimulation and thermotherapy: hydrocollator packs    Planned therapy interventions: balance/weight bearing training, body mechanics training, flexibility, functional ROM exercises, transfer training, home exercise program, therapeutic activities, therapeutic exercise, stretching, strengthening, massage, Bai taping, neuromuscular re-education, patient education, manual therapy, postural training, joint mobilization, IASTM, kinesiology taping and nerve gliding    Frequency: 1-2x week  Duration in weeks: 4  Treatment plan discussed with: patient, PTA and referring physician        PT Pelvic Floor Subjective:   History of Present Illness:   Chela Apodaca is a 59 y.o. year-old female who presents to outpatient PT with reports of stress incontinence and occasionally with urgency. Patient reports her symptoms have been going on for many years. She reports history of bladder polyps many years ago. She reports she had a  cystoscope and noticed her symptoms worsened since. She reports she has had repeated cystoscopes since with no more polyps revealed. Patient reports she has been on medication that has helped with her leakage, but notices leakage when she is not taking the medication. She saw urologist beginning of January and was recommended for PT at this time. Quality of life: good    Social Support:     Lives in:  Multiple-level home    Lives with:  Spouse    Relationship status: /committed    Work status: retired (retired; professor at [employer])    Life stress severity: moderate    History of Depression: yes  Diet and Exercise:    Diet:balanced nutrition    Exercise type: no activity    Not exercising due to: not interested  OB/ gyn History    Gestational History:     Prior Pregnancy: No      Menstrual History:      Menopausal: menopause    Birth control: no contraception  no hormone replacement therapy  Bladder Function:     Voiding Difficulties negative for: urgency       Voiding Difficulties comments:     Voiding frequency: every 3-4 hours    Urinary leakage: urine leakage    Urinary leakage aggravated by: coughing, sneezing, post-void dribble, unknown and laughing (someone startles)    Painful urination: No      Fluid Intake Type:  Coffee and water    Intake (ounces): Water: 40, Coffee: 16,   Incontinence Management:     Pads/Diaper Use:  24 hours  Bowel Function:     Bowel frequency: daily  Pain:     No pain reported by patient.  Diagnostic Tests:     Cystoscopy: normal  Treatments:     Current treatment: physical therapy    Patient Goals:     Patient goals for therapy:  Improved bladder or bowel function, improved comfort, decreased interpersonal problems and improved quality of life      Objective     Passive Range of Motion     Additional Passive Range of Motion Details  Passive stretching bilateral LE moderate tightness hip adductor, hamstring, piriformis without pain    Strength/Myotome Testing     Left Hip    Planes of Motion   Flexion: 4-  Extension: 3  Abduction: 4-  Adduction: 3+  External rotation: 4-  Internal rotation: 3+    Right Hip   Planes of Motion   Flexion: 4-  Extension: 3  Abduction: 4-  Adduction: 3+  External rotation: 4-  Internal rotation: 3+      Abdominal Assessment:      Position: supine exam      General Perineum Exam:   perineum intact.     Visual Inspection of Perineum:   Excursion of perineal body in cephalad direction with contraction of pelvic floor muscles (PFM): good  Excursion of perineal body in caudal direction with relaxation of pelvic floor muscles (PFM): weak  Involuntary contraction with coughing: no  Involuntary relaxation with bearing down: no  Cough reflex: cough reflex  Sensation: intact    Pelvic Organ Prolapse   no pelvic organ prolapse  Perineal body inspection: within normal limits        Pelvic Floor Muscle Exam:     Muscle Contraction: well isolated   Breathing pattern with contraction: within normal limits   Pelvic floor muscle relaxation is complete.   90% pelvic floor relaxation   1 seconds required for complete relaxation.        PERFECT Score   Power right: 3/5   Power left: 3/5   Endurance (seconds to max): 3   Repetitions (before fatigue): 2   Fast flicks (in 10 seconds): 7       pelvic floor exam consent given by patient    Pelvic exam completed: vaginally                 POC Expires Auth Status Start Date Expiration Date PT Visit Limit    3/5/2025 No Auth Req   60 visits PCY   Date 2/5/2025       Used 1       Remaining 59          Diagnosis: stress incontinence, pelvic floor weakness   Precautions: anxiety/depression, hx of bladder polyps   Next Physician's Appt:   [place] HEP:   Manuals 2/5       STM        PROM        PERF 3/3/2/7               Neuro Re-Ed        Biofeedback        PFM Contract        Quick Flicks        TA ball press        Glute Set        TA + PFM w/ hip ADD ball squeeze        TA + PFM w/ BKFO        TA + PFM w/ H/L vilma                                 Ther Ex        SKTC        DKTC w/ ball        LTRs        Butterfly ADD Stretch        SHAN/ piriformis stretch        Happy Baby        Standing adductor stretch        Child's Pose        SB pelvic floor circles                                 Ther Activity              Bike for LE mobility             PFM w/ STS        TA + PFM w/ lifting/carrying tasks                Diaphragmatic Breathing        Bladder Diary Review NV       Pt Ed HEP, POC       Re-Evaluation             Modalities             Heat/ice (PRN)

## 2025-02-06 ENCOUNTER — TELEPHONE (OUTPATIENT)
Age: 60
End: 2025-02-06

## 2025-02-06 DIAGNOSIS — Z00.00 WELL ADULT EXAM: Primary | ICD-10-CM

## 2025-02-06 DIAGNOSIS — R73.03 PRE-DIABETES: ICD-10-CM

## 2025-02-06 DIAGNOSIS — E66.811 OBESITY (BMI 30.0-34.9): ICD-10-CM

## 2025-02-06 NOTE — TELEPHONE ENCOUNTER
Patient is scheduled for 2/25/2025 as you are out for the next 2 week. Would we be able to give her enough to last until she comes in. Patient states she has been trying to get the medication for the longest if time and has finally found a pharmacy that has the medication available.

## 2025-02-06 NOTE — TELEPHONE ENCOUNTER
Pt called and would like to start on the WEGOVY that was previously prescribed for her.     She says that she never started it, but would like to now.     The medication is OOS at her regular pharmacy, but is available at [facility].    Please advise and let patient know if we can accommodate her request.

## 2025-02-07 ENCOUNTER — TELEPHONE (OUTPATIENT)
Dept: GASTROENTEROLOGY | Facility: CLINIC | Age: 60
End: 2025-02-07

## 2025-02-10 ENCOUNTER — OFFICE VISIT (OUTPATIENT)
Dept: PHYSICAL THERAPY | Facility: CLINIC | Age: 60
End: 2025-02-10
Payer: COMMERCIAL

## 2025-02-10 DIAGNOSIS — N81.89 PELVIC FLOOR WEAKNESS IN FEMALE: ICD-10-CM

## 2025-02-10 DIAGNOSIS — N39.3 STRESS INCONTINENCE: Primary | ICD-10-CM

## 2025-02-10 PROCEDURE — 97112 NEUROMUSCULAR REEDUCATION: CPT

## 2025-02-10 PROCEDURE — 97110 THERAPEUTIC EXERCISES: CPT

## 2025-02-10 PROCEDURE — 97530 THERAPEUTIC ACTIVITIES: CPT

## 2025-02-10 NOTE — PROGRESS NOTES
"Daily Note     Today's date: 2/10/2025  Patient name: Chela Apodaca  : 1965  MRN: [id number]  Referring provider: Mart Montoya P*  Dx:   Encounter Diagnosis     ICD-10-CM    1. Stress incontinence  N39.3       2. Pelvic floor weakness in female  N81.89                      Subjective: Pt states that she did do her exercises and they seem to be going fine.  She reports that she does notice fatigue after just a few kegals.        Objective: See treatment diary below      Assessment: Tolerated treatment well.  Focused on muscle activation for core, gluts, and PF along with strengthening for hips.  Pt was challenged with session due to weakness and fatigue.  Pt reports feeling most challenged with quick flicks and has difficulty transitioning from 1 rep to the next.  Reviewed bladder diary with no red flags, however pt was encouraged to drink more fluids (as pt is already aware).  Discussed HEP with pt reported understanding and compliance.  Will progress as able.      Plan: Continue per plan of care.         POC Expires Auth Status Start Date Expiration Date PT Visit Limit    3/5/2025 No Auth Req   60 visits PCY   Date 2025 2/10/2025      Used 1 2      Remaining 59 58         Diagnosis: stress incontinence, pelvic floor weakness   Precautions: anxiety/depression, hx of bladder polyps   Next Physician's Appt:   [employer] HEP:   Manuals 2/5 2/10      STM        PROM        PERF 3/3/2/7               Neuro Re-Ed        Biofeedback        PFM Contract  10x3\" relax between      Quick Flicks  10x       TA ball press  10x3\"      Glute Set  10x3\"      TA + PFM w/ hip ADD ball squeeze  No TA 10x3\"      TA + PFM w/ BKFO  Clams 20x      TA + PFM w/ H/L marches        TA+SLR  10x2 ea                      Ther Ex        SKTC        DKTC w/ ball  20x      LTRs  10x ea      Butterfly ADD Stretch        SHAN/ piriformis stretch        Happy Baby        Standing adductor stretch        Child's Pose        SB " pelvic floor circles                                 Ther Activity              Bike for LE mobility             PFM w/ STS        TA + PFM w/ lifting/carrying tasks                Diaphragmatic Breathing        Bladder Diary Review NV TH      Pt Ed HEP, POC HEP      Re-Evaluation             Modalities             Heat/ice (PRN)

## 2025-02-17 ENCOUNTER — OFFICE VISIT (OUTPATIENT)
Dept: PHYSICAL THERAPY | Facility: CLINIC | Age: 60
End: 2025-02-17
Payer: COMMERCIAL

## 2025-02-17 DIAGNOSIS — N81.89 PELVIC FLOOR WEAKNESS IN FEMALE: ICD-10-CM

## 2025-02-17 DIAGNOSIS — N39.3 STRESS INCONTINENCE: Primary | ICD-10-CM

## 2025-02-17 PROCEDURE — 97140 MANUAL THERAPY 1/> REGIONS: CPT | Performed by: PHYSICAL THERAPIST

## 2025-02-17 PROCEDURE — 97530 THERAPEUTIC ACTIVITIES: CPT | Performed by: PHYSICAL THERAPIST

## 2025-02-17 PROCEDURE — 97110 THERAPEUTIC EXERCISES: CPT | Performed by: PHYSICAL THERAPIST

## 2025-02-17 PROCEDURE — 97112 NEUROMUSCULAR REEDUCATION: CPT | Performed by: PHYSICAL THERAPIST

## 2025-02-17 NOTE — PROGRESS NOTES
"Daily Note     Today's date: 2025  Patient name: Chela Apodaca  : 1965  MRN: [id number]  Referring provider: Mart Montoya P*  Dx:   Encounter Diagnosis     ICD-10-CM    1. Stress incontinence  N39.3       2. Pelvic floor weakness in female  N81.89           Start Time: 0700  Stop Time: 0745  Total time in clinic (min): 45 minutes    Subjective: Patient reports she feels good overall, but noticed some Right lower back/buttock pain when waking up this morning. She reports she does not recall any new or changes in activities over the weekend.      Objective: See treatment diary below      Assessment: Tolerated treatment well. Initiated session with standing L/S extensions and piriformis stretching that helped reduced pain. Patient noted to have tenderness and pain to palpation in upper Right piriformis region, with some relief in pain noted with STM. Educated patient on using ice/heat pack to relieve symptoms and self piriformis stretches but to not overdo stretching. Patient tolerated all other exercises with good response. Cuing required on TA contractions to avoid compensations and overflow of contraction. Provided patient with updated HEP. Patient exhibited good technique with therapeutic exercises and would benefit from continued PT      Plan: Continue per plan of care.  Progress treatment as tolerated.          POC Expires Auth Status Start Date Expiration Date PT Visit Limit    3/5/2025 No Auth Req   60 visits PCY   Date 2025 2/10/2025 2025     Used 1 2 3     Remaining 59 58 57        Diagnosis: stress incontinence, pelvic floor weakness   Precautions: anxiety/depression, hx of bladder polyps   Next Physician's Appt:   [employer] HEP:   Manuals 2/5 2/10 2/17     STM   DK, R piriformis     PROM        PERF 3/3/2/7               Neuro Re-Ed        Biofeedback        PFM Contract  10x3\" relax between 10x3\" relax between     Quick Flicks  10x       TA ball press  10x3\" [place]ster 3-5\" x15 " "cues     Glute Set  10x3\"      PPT   Supine 3\" 2x10     TA + PFM w/ hip ADD ball squeeze  No TA 10x3\" Bolster 3\" x15     TA + PFM w/ BKFO  Clams 20x      TA + PFM w/ H/L marches        TA+SLR  10x2 ea                      Ther Ex        SKTC   5-10\"x10 ea     DKTC w/ ball  20x      LTRs  10x ea      Butterfly ADD Stretch        SHAN/ piriformis stretch   Piriformis stretch 5-10\" x10 ea     Happy Baby        Standing adductor stretch        Child's Pose        SB pelvic floor circles        Standing L/S extensions   10x @ table                      Ther Activity              Bike for LE mobility             PFM w/ STS        TA + PFM w/ lifting/carrying tasks                Diaphragmatic Breathing        Bladder Diary Review NV TH      Pt Ed HEP, POC HEP HEP     Re-Evaluation             Modalities             Heat/ice (PRN)                                       "

## 2025-02-25 ENCOUNTER — OFFICE VISIT (OUTPATIENT)
Age: 60
End: 2025-02-25
Payer: COMMERCIAL

## 2025-02-25 ENCOUNTER — OFFICE VISIT (OUTPATIENT)
Dept: PHYSICAL THERAPY | Facility: CLINIC | Age: 60
End: 2025-02-25
Payer: COMMERCIAL

## 2025-02-25 VITALS
HEART RATE: 80 BPM | OXYGEN SATURATION: 99 % | SYSTOLIC BLOOD PRESSURE: 116 MMHG | RESPIRATION RATE: 18 BRPM | TEMPERATURE: 97.7 F | DIASTOLIC BLOOD PRESSURE: 80 MMHG | HEIGHT: 64 IN | BODY MASS INDEX: 35.17 KG/M2 | WEIGHT: 206 LBS

## 2025-02-25 DIAGNOSIS — N39.3 STRESS INCONTINENCE: Primary | ICD-10-CM

## 2025-02-25 DIAGNOSIS — Z00.00 ANNUAL PHYSICAL EXAM: ICD-10-CM

## 2025-02-25 DIAGNOSIS — R73.03 PREDIABETES: ICD-10-CM

## 2025-02-25 DIAGNOSIS — E66.811 OBESITY (BMI 30.0-34.9): Primary | ICD-10-CM

## 2025-02-25 DIAGNOSIS — N81.89 PELVIC FLOOR WEAKNESS IN FEMALE: ICD-10-CM

## 2025-02-25 PROBLEM — G89.29 CHRONIC PAIN OF RIGHT ANKLE: Status: RESOLVED | Noted: 2018-12-28 | Resolved: 2025-02-25

## 2025-02-25 PROBLEM — M25.571 CHRONIC PAIN OF RIGHT ANKLE: Status: RESOLVED | Noted: 2018-12-28 | Resolved: 2025-02-25

## 2025-02-25 PROBLEM — T78.40XA ALLERGIC REACTION: Status: RESOLVED | Noted: 2018-11-26 | Resolved: 2025-02-25

## 2025-02-25 PROBLEM — M79.5 FOREIGN BODY (FB) IN SOFT TISSUE: Status: RESOLVED | Noted: 2019-09-12 | Resolved: 2025-02-25

## 2025-02-25 PROBLEM — R31.29 MICROSCOPIC HEMATURIA: Status: RESOLVED | Noted: 2021-02-16 | Resolved: 2025-02-25

## 2025-02-25 PROBLEM — S86.112A STRAIN OF GASTROCNEMIUS MUSCLE OF LEFT LOWER EXTREMITY: Status: RESOLVED | Noted: 2017-07-06 | Resolved: 2025-02-25

## 2025-02-25 PROCEDURE — 97110 THERAPEUTIC EXERCISES: CPT | Performed by: PHYSICAL THERAPIST

## 2025-02-25 PROCEDURE — 97112 NEUROMUSCULAR REEDUCATION: CPT | Performed by: PHYSICAL THERAPIST

## 2025-02-25 PROCEDURE — 97530 THERAPEUTIC ACTIVITIES: CPT | Performed by: PHYSICAL THERAPIST

## 2025-02-25 PROCEDURE — 99396 PREV VISIT EST AGE 40-64: CPT | Performed by: FAMILY MEDICINE

## 2025-02-25 RX ORDER — LIRAGLUTIDE 6 MG/ML
3 INJECTION, SOLUTION SUBCUTANEOUS DAILY
Qty: 15 ML | Refills: 5 | Status: SHIPPED | OUTPATIENT
Start: 2025-02-25 | End: 2025-03-05 | Stop reason: CLARIF

## 2025-02-25 NOTE — PATIENT INSTRUCTIONS
"Patient Education     Routine physical for adults   The Basics   Written by the doctors and editors at [facility]   What is a physical? -- A physical is a routine visit, or \"check-up,\" with your doctor. You might also hear it called a \"wellness visit\" or \"preventive visit.\"  During each visit, the doctor will:   Ask about your physical and mental health   Ask about your habits, behaviors, and lifestyle   Do an exam   Give you vaccines if needed   Talk to you about any medicines you take   Give advice about your health   Answer your questions  Getting regular check-ups is an important part of taking care of your health. It can help your doctor find and treat any problems you have. But it's also important for preventing health problems.  A routine physical is different from a \"sick visit.\" A sick visit is when you see a doctor because of a health concern or problem. Since physicals are scheduled ahead of time, you can think about what you want to ask the doctor.  How often should I get a physical? -- It depends on your age and health. In general, for people age 21 years and older:   If you are younger than 50 years, you might be able to get a physical every 3 years.   If you are 50 years or older, your doctor might recommend a physical every year.  If you have an ongoing health condition, like diabetes or high blood pressure, your doctor will probably want to see you more often.  What happens during a physical? -- In general, each visit will include:   Physical exam - The doctor or nurse will check your height, weight, heart rate, and blood pressure. They will also look at your eyes and ears. They will ask about how you are feeling and whether you have any symptoms that bother you.   Medicines - It's a good idea to bring a list of all the medicines you take to each doctor visit. Your doctor will talk to you about your medicines and answer any questions. Tell them if you are having any side effects that bother you. You " "should also tell them if you are having trouble paying for any of your medicines.   Habits and behaviors - This includes:   Your diet   Your exercise habits   Whether you smoke, drink alcohol, or use drugs   Whether you are sexually active   Whether you feel safe at home  Your doctor will talk to you about things you can do to improve your health and lower your risk of health problems. They will also offer help and support. For example, if you want to quit smoking, they can give you advice and might prescribe medicines. If you want to improve your diet or get more physical activity, they can help you with this, too.   Lab tests, if needed - The tests you get will depend on your age and situation. For example, your doctor might want to check your:   Cholesterol   Blood sugar   Iron level   Vaccines - The recommended vaccines will depend on your age, health, and what vaccines you already had. Vaccines are very important because they can prevent certain serious or deadly infections.   Discussion of screening - \"Screening\" means checking for diseases or other health problems before they cause symptoms. Your doctor can recommend screening based on your age, risk, and preferences. This might include tests to check for:   Cancer, such as breast, prostate, cervical, ovarian, colorectal, prostate, lung, or skin cancer   Sexually transmitted infections, such as chlamydia and gonorrhea   Mental health conditions like depression and anxiety  Your doctor will talk to you about the different types of screening tests. They can help you decide which screenings to have. They can also explain what the results might mean.   Answering questions - The physical is a good time to ask the doctor or nurse questions about your health. If needed, they can refer you to other doctors or specialists, too.  Adults older than 65 years often need other care, too. As you get older, your doctor will talk to you about:   How to prevent falling at " home   Hearing or vision tests   Memory testing   How to take your medicines safely   Making sure that you have the help and support you need at home  All topics are updated as new evidence becomes available and our peer review process is complete.  This topic retrieved from [employer] on: May 02, 2024.  Topic 414380 Version 1.0  Release: 32.4.3 - C32.122  © 2024 UpToDate, Inc. and/or its affiliates. All rights reserved.  Consumer Information Use and Disclaimer   Disclaimer: This generalized information is a limited summary of diagnosis, treatment, and/or medication information. It is not meant to be comprehensive and should be used as a tool to help the user understand and/or assess potential diagnostic and treatment options. It does NOT include all information about conditions, treatments, medications, side effects, or risks that may apply to a specific patient. It is not intended to be medical advice or a substitute for the medical advice, diagnosis, or treatment of a health care provider based on the health care provider's examination and assessment of a patient's specific and unique circumstances. Patients must speak with a health care provider for complete information about their health, medical questions, and treatment options, including any risks or benefits regarding use of medications. This information does not endorse any treatments or medications as safe, effective, or approved for treating a specific patient. UpToDate, Inc. and its affiliates disclaim any warranty or liability relating to this information or the use thereof.The use of this information is governed by the Terms of Use, available at https://www.woltersEB Holdingsuwer.com/en/know/clinical-effectiveness-terms. 2024© UpToDate, Inc. and its affiliates and/or licensors. All rights reserved.  Copyright   © 2024 UpToDate, Inc. and/or its affiliates. All rights reserved.    Patient Education     Diet and health   The Basics   Written by the doctors and  "editors at [employer]   Why is it important to eat a healthy diet? -- It's important to eat a healthy diet because eating the right foods can keep you healthy now and later in life. It can lower the risk of problems like heart disease, diabetes, high blood pressure, and some types of cancer. It can also help you live longer and improve your quality of life.  What kind of diet is best? -- There is no 1 specific diet that experts recommend for everyone. People choose what foods to eat for many different reasons. These include personal preference, culture, [employer], allergies or intolerances, and nutritional goals. People also need to consider the cost and availability of different foods.  In general, experts recommend a diet that:   Includes lots of vegetables, fruits, beans, nuts, and whole grains   Limits red and processed meats, unhealthy fats, sugar, salt, and alcohol  What are dietary patterns? -- A dietary \"pattern\" means generally eating certain types of foods while limiting others. Some people need to follow a specific dietary pattern because of their health needs. For example, if you have high blood pressure, your doctor might recommend a diet low in salt.  If you are trying to improve your health in general, choosing a healthy dietary pattern can help. This does not have to mean being very strict about what you eat or avoid. The goal is to think about getting plenty of healthy foods while limiting less healthy foods.  Examples of dietary patterns include:   Mediterranean diet - This involves eating a lot of fruits, vegetables, nuts, and whole grains, and uses olive oil instead of other fats. It also includes some fish, poultry, and dairy products, but not a lot of red meat. Following this diet can help your overall health, and might even lower your risk of having a stroke.   Plant-based diets - These patterns focus on vegetables, fruits, grains, beans, and nuts. They limit or avoid food that comes from " "animals, such as meat and dairy. There are different types of plant-based diets, including vegetarian and vegan.   Low-fat diet - A low-fat diet involves limiting calories from fat. This might help some people keep weight off if that is their goal, but it does not have many other health benefits. If you choose to follow a low-fat diet, it is also important to focus on getting lots of whole grains, legumes, fruits, and vegetables. Limit refined grains and sugar.   Low-cholesterol diet - Cholesterol is found in foods with a lot of saturated fat, like red meat, butter, and cheese. A low-cholesterol diet focuses on limiting the amount of cholesterol that you eat. Limiting the cholesterol in your diet can also help lower the amount of unhealthy fats that you eat.  Which foods are especially healthy? -- Foods that are especially healthy include:   Fruits and vegetables - Eating a diet with lots of fruits and vegetables can help prevent heart disease and stroke. It might also help prevent certain types of cancer. Try to eat fruits and vegetables at each meal and also for snacks. If you don't have fresh fruits and vegetables available, you can eat frozen or canned ones instead. Doctors recommend eating at least 5 servings of fruits or vegetables each day.   Whole grains - Whole-grain foods include 100 percent whole-wheat bread, steel cut oats, and whole-grain pasta. These are healthier than foods made with \"refined\" grains, like white bread and white rice. Eating lots of whole grains instead of refined grains has been shown to help with weight control. It can also lower the risk of several health problems, including colon cancer, heart disease, and diabetes. Doctors recommend that most people try to eat 5 to 8 servings of whole-grain, high-fiber foods each day.   Foods with fiber - Eating foods with a lot of fiber can help prevent heart disease and stroke. If you have type 2 diabetes, it can also help control your blood " "sugar. Foods that have a lot of fiber include vegetables, fruits, beans, nuts, oatmeal, and whole-grain breads and cereals. You can tell how much fiber is in a food by reading the nutrition label (figure 1). Doctors recommend that most people eat about 25 to 34 grams of fiber each day.   Foods with calcium and vitamin D - Babies, children, and adults need calcium and vitamin D to help keep their bones strong. Adults also need calcium and vitamin D to help prevent osteoporosis. Osteoporosis is a condition that causes bones to get \"thin\" and break more easily than usual. Different foods and drinks have calcium and vitamin D in them (figure 2). People who don't get enough calcium and vitamin D in their diet might need to take a supplement. Doctors recommend that most people have 2 to 3 servings of foods with calcium and vitamin D each day.   Foods with protein - Protein helps your muscles and bones stay strong. Healthy foods with a lot of protein include chicken, fish, eggs, beans, nuts, and soy products. Red meat also has a lot of protein, but it also contains fats, which can be unhealthy. Doctors recommend that most people try to eat about 5 servings of protein each day.   Healthy fats - There are different types of fats. Some types of fats are better for your body than others. Healthy fats are \"monounsaturated\" or \"polyunsaturated\" fats. These are found in fatty fish, nuts and nut butters, and avocados. Use plant-based oils when cooking. Examples of these oils include olive, canola, safflower, sunflower, and corn oil. Eating foods with healthy fats, while avoiding or limiting foods with unhealthy fats, might lower the risk of heart disease.   Foods with folate - Folate is a vitamin that is important for pregnant people, since it helps prevent certain birth defects. It is also called \"folic acid.\" Anyone who could get pregnant should get at least 400 micrograms of folic acid daily, whether or not they are actively " "trying to get pregnant. Folate is found in many breakfast cereals, oranges, orange juice, and green leafy vegetables.  What foods should I avoid or limit? -- To eat a healthy diet, there are some things that you should avoid or limit. They include:   Unhealthy fats - \"Trans\" fats are especially unhealthy. They are found in margarines, many fast foods, and some store-bought baked goods. \"Saturated\" fats are found in animal products like meats, egg yolks, butter, cheese, and full-fat milk products. Unhealthy fats can raise your cholesterol level and increase your chance of getting heart disease.   Sugar - To have a healthy diet, it's important to limit or avoid added sugar, sweets, and refined grains. Refined grains are found in white bread, white rice, most pastas, and most packaged \"snack\" foods.  Avoiding sugar-sweetened beverages, like soda and sports drinks, can also help improve your health.  Avoid canned fruits in \"heavy\" syrup.   Red and processed meats - Studies have shown that eating a lot of red meat can increase your risk of certain health problems, including heart disease and cancer. You should limit the amount of red meat that you eat. This is also true for processed meats like sausage, hot dogs, and locke.  Can I drink alcohol as part of a healthy diet? -- Not drinking alcohol at all is the healthiest choice. Regular drinking can raise a person's chances of getting liver disease and certain types of cancers. In females, even 1 drink a day can increase the risk of getting breast cancer.  If you do choose to drink, most doctors recommend limiting alcohol to no more than:   1 drink a day for females   2 drinks a day for males  The limits are different because, generally, the female body takes longer to break down alcohol.  How many calories do I need each day? -- Calories give your body energy. The number of calories that you need each day depends on your weight, height, age, sex, and how active you " "are.  Your doctor or nurse can tell you about how many calories you should eat each day. You can also work with a dietitian (nutrition expert) to learn more about your dietary needs and options.  What if I am having trouble improving my diet? -- It can be hard to change the way that you eat. Remember that even small changes can improve your health.  Here are some tips that might help:   Try to make fruits and vegetables part of every meal. If you don't have fresh fruits and vegetables, frozen or canned are good options. Look for products without added salt or sugar.   Keep a bowl of fruit out for snacking.   When you can, choose whole grains instead of refined grains. Choose chicken, fish, and beans instead of red meat and cheese.   Try to eat prepared and processed foods less often.   Try flavored seltzer or water instead of soda or juice.   When eating at fast food restaurants, look for healthier items, like broiled chicken or salad.  If you have questions about which foods you should or should not eat, ask your doctor, nurse, or dietitian. The right diet for you will depend, in part, on your health and any medical conditions you have.  All topics are updated as new evidence becomes available and our peer review process is complete.  This topic retrieved from [employer] on: Feb 28, 2024.  Topic 25110 Version 28.0  Release: 32.2.4 - C32.58  © 2024 UpToDate, Inc. and/or its affiliates. All rights reserved.  figure 1: Nutrition label - Fiber     This is an example of a nutrition label. To figure out how much fiber is in a food, look for the line that says \"Dietary Fiber.\" It's also important to look at the serving size. This food has 7 grams of fiber in each serving, and each serving is 1 cup.  Graphic 12431 Version 8.0  figure 2: Foods and drinks with calcium and vitamin D     Foods rich in calcium include ice cream, soy milk, breads, kale, broccoli, milk, cheese, cottage cheese, almonds, yogurt, ready-to-eat cereals, " "beans, and tofu. Foods rich in vitamin D include milk, fortified plant-based \"milks\" (soy, almond), canned tuna fish, cod liver oil, yogurt, ready-to-eat-cereals, cooked salmon, canned sardines, mackerel, and eggs. Some of these foods are rich in both.  Graphic 77352 Version 4.0  Consumer Information Use and Disclaimer   Disclaimer: This generalized information is a limited summary of diagnosis, treatment, and/or medication information. It is not meant to be comprehensive and should be used as a tool to help the user understand and/or assess potential diagnostic and treatment options. It does NOT include all information about conditions, treatments, medications, side effects, or risks that may apply to a specific patient. It is not intended to be medical advice or a substitute for the medical advice, diagnosis, or treatment of a health care provider based on the health care provider's examination and assessment of a patient's specific and unique circumstances. Patients must speak with a health care provider for complete information about their health, medical questions, and treatment options, including any risks or benefits regarding use of medications. This information does not endorse any treatments or medications as safe, effective, or approved for treating a specific patient. UpToDate, Inc. and its affiliates disclaim any warranty or liability relating to this information or the use thereof.The use of this information is governed by the Terms of Use, available at https://www.woltersHummingbird Mobile Dentaluwer.com/en/know/clinical-effectiveness-terms. 2024© UpToDate, Inc. and its affiliates and/or licensors. All rights reserved.  Copyright   © 2024 UpToDate, Inc. and/or its affiliates. All rights reserved.    Patient Education     Mediterranean diet   The Basics   Written by the doctors and editors at [facility]   What is a Mediterranean diet? -- A Mediterranean diet is a heart-healthy way of eating. It includes foods and cooking " styles from many countries around the [place], like [place] and [place]. The exact foods included vary from place to place.  A Mediterranean diet involves eating a lot of fruits, vegetables, nuts, and whole grains. It uses olive oil instead of other fats. It also includes some fish, poultry, and dairy products, but not a lot of red meat.  Wine is often thought of as part of a Mediterranean diet. It is not needed, and you might choose not to include it. If you do drink alcohol, limit the amount to:   For females, no more than 1 drink a day   For males, no more than 2 drinks a day  What are the benefits of a Mediterranean diet? -- A Mediterranean diet can help you:   Improve your overall health, and help you lose weight   Lower your risk of stroke   Lower your risk of heart problems such as a heart attack   Manage your blood sugar if you have diabetes  What can I eat and drink on a Mediterranean diet? -- A Mediterranean diet is more of an eating pattern than a strict diet. Try to cover two-thirds of your plate with fresh fruits and vegetables. Some examples of foods that are often part of this pattern:   Grains - Whole grains like whole-grain bread and pasta, oats, couscous, brown rice, barley, and orzo.   Fruits - Many kinds and colors of fresh, frozen, dried, or canned fruits. Frozen or canned fruits with 100% fruit juice or water (without added sugar). Examples include apples, pears, berries, melons, bananas, plums, raisins, figs, and peaches.   Vegetables - Many kinds and colors of fresh, frozen, or canned vegetables. If canned, low sodium or salt free. If frozen, without added fat and sodium. Examples include avocados, peppers, tomatoes, spinach, kale, beans, carrots, peas, olives, cucumbers, hummus, soybeans, lentils, and kidney beans.   Dairy - Low-fat milk, cheese, and other dairy products. Greek yogurt, kefir, and plant-based milk alternatives like soy milk.   Lean meats, poultry, seafood, and proteins  - [place], tuna, cod, and other fish. Shrimp, clams, scallops, and mussels. White meat chicken and turkey, eggs, dried beans, lentils, and tofu. Nuts such as walnuts, almonds, pecans, hazelnuts, cashews, peanuts, and nut butters. Seeds such as pumpkin, sesame, flax, and sunflower seeds.   Fats, oils, and other foods - Foods with healthy fats found in fish, nuts, and avocados. Olive oil or vegetable oils such as canola oil. Use onions, garlic, spices, and herbs to season food.  What foods and drinks should I avoid or limit on a Mediterranean diet? -- A Mediterranean diet involves avoiding or limiting certain types of foods. Try to avoid foods with additives like artificial sweeteners. Avoid foods that are processed, refined, or preserved. These are often foods with a very long shelf life.   Grains to avoid - White bread, pasta, white rice, crackers, and biscuits.   Fruits to avoid - Fruits canned or frozen with extra sugar.   Vegetables to avoid - Commercially prepared potatoes and vegetable mixes, regular canned vegetables and juices, and vegetables frozen with sauce or pickled vegetables.   Dairy to avoid - Whole-fat dairy products like cheese, ice cream, whole milk, cream, and buttermilk.   Lean meats, poultry, seafood, and proteins to avoid - Red meat such as beef, pork, and lamb. Processed meats such as sausages, deli meats, salami, hot dogs, and locke.   Fats, oils, and other foods to avoid - Butter, margarine, lard, gravies, sauces, and salad dressing. Cookies, cakes, candy, doughnuts, muffins, and other sweets.  All topics are updated as new evidence becomes available and our peer review process is complete.  This topic retrieved from [employer] on: Apr 04, 2024.  Topic 633461 Version 2.0  Release: 32.2.4 - C32.93  © 2024 UpToDate, Inc. and/or its affiliates. All rights reserved.  Consumer Information Use and Disclaimer   Disclaimer: This generalized information is a limited summary of diagnosis, treatment,  and/or medication information. It is not meant to be comprehensive and should be used as a tool to help the user understand and/or assess potential diagnostic and treatment options. It does NOT include all information about conditions, treatments, medications, side effects, or risks that may apply to a specific patient. It is not intended to be medical advice or a substitute for the medical advice, diagnosis, or treatment of a health care provider based on the health care provider's examination and assessment of a patient's specific and unique circumstances. Patients must speak with a health care provider for complete information about their health, medical questions, and treatment options, including any risks or benefits regarding use of medications. This information does not endorse any treatments or medications as safe, effective, or approved for treating a specific patient. UpToDate, Inc. and its affiliates disclaim any warranty or liability relating to this information or the use thereof.The use of this information is governed by the Terms of Use, available at https://www.woltersCOHuwer.com/en/know/clinical-effectiveness-terms. 2024© UpToDate, Inc. and its affiliates and/or licensors. All rights reserved.  Copyright   © 2024 UpToDate, Inc. and/or its affiliates. All rights reserved.

## 2025-02-25 NOTE — PROGRESS NOTES
Adult Annual Physical  Name: Chela Apodaca      : 1965      MRN: [id number]  Encounter Provider: Rex Whalen DO  Encounter Date: 2025   Encounter department: [facility] PRIMARY CARE    Assessment & Plan  Obesity (BMI 30.0-34.9)  Prior Authorization Clinical Questions for Weight Management Pharmacotherapy            Orders:  •  liraglutide (Saxenda) injection; Inject 0.5 mL (3 mg total) under the skin daily    Prediabetes    Orders:  •  liraglutide (Saxenda) injection; Inject 0.5 mL (3 mg total) under the skin daily    Annual physical exam         BMI 35.0-35.9,adult  She is going to continue to work on diet exercise and weight loss.  Hopefully her pharmacy benefits provider will cover the Saxenda.  Follow-up in 3 months with fasting labs.         Immunizations and preventive care screenings were discussed with patient today. Appropriate education was printed on patient's after visit summary.    Counseling:  Alcohol/drug use: discussed moderation in alcohol intake, the recommendations for healthy alcohol use, and avoidance of illicit drug use.  Dental Health: discussed importance of regular tooth brushing, flossing, and dental visits.  Injury prevention: discussed safety/seat belts, safety helmets, smoke detectors, carbon monoxide detectors, and smoking near bedding or upholstery.  Sexual health: discussed sexually transmitted diseases, partner selection, use of condoms, avoidance of unintended pregnancy, and contraceptive alternatives.  Exercise: the importance of regular exercise/physical activity was discussed. Recommend exercise 3-5 times per week for at least 30 minutes.          History of Present Illness     Adult Annual Physical:  Patient presents for annual physical.     Diet and Physical Activity:  - Diet/Nutrition: well balanced diet.  - Exercise: walking.    Depression Screening:    - PHQ-9 Score: 0    General Health:  - Sleep: sleeps well.  - Hearing: normal hearing right  "ear.  - Vision: no vision problems.  - Dental: regular dental visits.    /GYN Health:    - History of STDs: no     Health:  - History of STDs: no.     Advanced Care Planning:  - Has an advanced directive?: no    - Has a durable medical POA?: no    - ACP document given to patient?: no      Review of Systems   Constitutional: Negative.    HENT: Negative.     Eyes: Negative.    Respiratory: Negative.     Cardiovascular: Negative.    Gastrointestinal: Negative.    Endocrine: Negative.    Genitourinary: Negative.    Musculoskeletal: Negative.    Skin: Negative.    Allergic/Immunologic: Negative.    Neurological: Negative.    Hematological: Negative.    Psychiatric/Behavioral: Negative.     All other systems reviewed and are negative.    Pertinent Medical History   Past Medical History:   Diagnosis Date   • Anxiety    • Depression    • Incontinence    • MVP (mitral valve prolapse)    • Obesity    • Sleep apnea     mild to moderate, had cpap device but was recalled, not replaced yet   • Urinary tract infection                  Objective   /80 (BP Location: Left arm, Patient Position: Sitting, Cuff Size: Large)   Pulse 80   Temp 97.7 °F (36.5 °C) (Temporal)   Resp 18   Ht 5' 4\" (1.626 m)   Wt 93.4 kg (206 lb)   SpO2 99%   BMI 35.36 kg/m²     Physical Exam  Vitals and nursing note reviewed.   Constitutional:       Appearance: She is well-developed. She is obese.   HENT:      Head: Normocephalic.   Eyes:      Pupils: Pupils are equal, round, and reactive to light.   Cardiovascular:      Rate and Rhythm: Normal rate and regular rhythm.      Heart sounds: Normal heart sounds.   Pulmonary:      Effort: Pulmonary effort is normal.      Breath sounds: Normal breath sounds.   Abdominal:      General: Bowel sounds are normal.      Palpations: Abdomen is soft.   Musculoskeletal:      Cervical back: Normal range of motion and neck supple.   Skin:     General: Skin is warm and dry.      Capillary Refill: Capillary " refill takes less than 2 seconds.   Neurological:      General: No focal deficit present.      Mental Status: She is alert and oriented to person, place, and time.   Psychiatric:         Mood and Affect: Mood normal.         Behavior: Behavior normal.

## 2025-02-25 NOTE — ASSESSMENT & PLAN NOTE
Prior Authorization Clinical Questions for Weight Management Pharmacotherapy            Orders:  •  liraglutide (Saxenda) injection; Inject 0.5 mL (3 mg total) under the skin daily

## 2025-02-25 NOTE — PROGRESS NOTES
"Daily Note     Today's date: 2025  Patient name: Chela Apodaca  : 1965  MRN: [id number]  Referring provider: Mart Montoya P*  Dx:   Encounter Diagnosis     ICD-10-CM    1. Stress incontinence  N39.3       2. Pelvic floor weakness in female  N81.89           Start Time: 0745  Stop Time: 08  Total time in clinic (min): 45 minutes    Subjective: Patient reports she notices her pelvic floor muscles are getting stronger. She reports she is leaking less with sneezing. She reports she gets some leakage occasionally while doing Kegels      Objective: See treatment diary below      Assessment: Tolerated treatment well. Initiated session working on PFM contraction and working on improving max contraction time and overall endurance. Performed Quick flicks as ladders with better control noted and less fatigue noted afterwards. Incorporated PFM contraction with hip adduction ball squeeze to work on PFM stability and control with dual task/movement. Patient had good response to today's session with overall better understanding of PFM contraction and control. Patient exhibited good technique with therapeutic exercises and would benefit from continued PT      Plan: Continue per plan of care.  Progress treatment as tolerated.          POC Expires Auth Status Start Date Expiration Date PT Visit Limit    3/5/2025 No Auth Req   60 visits PCY   Date 2025 2/10/2025 2025 2025    Used 1 2 3 4    Remaining 59 58 [phone] 56       Diagnosis: stress incontinence, pelvic floor weakness   Precautions: anxiety/depression, hx of bladder polyps   Next Physician's Appt:   [employer] HEP:   Manuals 2/5 2/10 2/17 2/25    STM   DK, R piriformis     PROM        PERF 3/3/2/7               Neuro Re-Ed        Biofeedback        PFM Contract  10x3\" relax between 10x3\" relax between 5\" 2x5, relax between    Quick Flicks  10x   10x ladders    TA ball press  10x3\" Bolster 3-5\" x15 cues Bolster 3\" x20    Glute Set  10x3\"    " "  PPT   Supine 3\" 2x10     TA + PFM w/ hip ADD ball squeeze  No TA 10x3\" Bolster 3\" x15 Bolster + PFM contraction 2\" 2x5    TA + PFM w/ BKFO  Clams 20x      TA + PFM w/ H/L marches        TA+SLR  10x2 ea                      Ther Ex        SKTC   5-10\"x10 ea     DKTC w/ ball  20x      LTRs  10x ea  10x ea    Butterfly ADD Stretch    10\" x10 with relaxation    SHAN/ piriformis stretch   Piriformis stretch 5-10\" x10 ea SHAN bilat 10\"x5 ea, piriformis bilat 10\"x5 ea    Happy Baby        Standing adductor stretch        Child's Pose        SB pelvic floor circles        Standing L/S extensions   10x @ table                      Ther Activity              Bike for LE mobility             PFM w/ STS        TA + PFM w/ lifting/carrying tasks                Diaphragmatic Breathing        Bladder Diary Review NV TH      Pt Ed HEP, POC HEP HEP HEP    Re-Evaluation             Modalities             Heat/ice (PRN)                                         "

## 2025-02-26 ENCOUNTER — TELEPHONE (OUTPATIENT)
Age: 60
End: 2025-02-26

## 2025-02-26 NOTE — TELEPHONE ENCOUNTER
Received call from patient stating she was prescribed liraglutide at yesterdays OV    Would like to inform provider that this medication is not covered by insurance.     Please review.

## 2025-03-03 ENCOUNTER — APPOINTMENT (OUTPATIENT)
Dept: PHYSICAL THERAPY | Facility: CLINIC | Age: 60
End: 2025-03-03
Payer: COMMERCIAL

## 2025-03-03 ENCOUNTER — EVALUATION (OUTPATIENT)
Dept: PHYSICAL THERAPY | Facility: CLINIC | Age: 60
End: 2025-03-03
Payer: COMMERCIAL

## 2025-03-03 DIAGNOSIS — N39.3 STRESS INCONTINENCE: Primary | ICD-10-CM

## 2025-03-03 DIAGNOSIS — N81.89 PELVIC FLOOR WEAKNESS IN FEMALE: ICD-10-CM

## 2025-03-03 PROCEDURE — 97140 MANUAL THERAPY 1/> REGIONS: CPT | Performed by: PHYSICAL THERAPIST

## 2025-03-03 PROCEDURE — 97530 THERAPEUTIC ACTIVITIES: CPT | Performed by: PHYSICAL THERAPIST

## 2025-03-03 NOTE — PROGRESS NOTES
PT Re-Evaluation     Today's date: 3/3/2025  Patient name: Chela Apodaca  : 1965  MRN: [id number]  Referring provider: Mart Montoya P*  Dx:   Encounter Diagnosis     ICD-10-CM    1. Stress incontinence  N39.3       2. Pelvic floor weakness in female  N81.89             Start Time: 0745  Stop Time: 0830  Total time in clinic (min): 45 minutes    Assessment  Impairments: abnormal muscle firing, abnormal muscle tone, abnormal or restricted ROM, impaired physical strength, lacks appropriate home exercise program, poor posture , participation limitations and activity limitations    Assessment details: Chela Apodaca is a 59 y.o. year-old female who presents with reports of leakage/incontinence with stress related activities such as coughing, sneezing, or other extreme/sudden activities or movements. Patient has attended PT for the past 1 month with reports of improving pelvic floor function and strength overall. She reports improved and reduced leakage with sneezing. She reports better ability getting out of bed and in general. She reports she continues to have post void dribbling and urinary leakage with unknown reasons, however she notices them occurring less often. Internal assessment of pelvic floor muscles revealed improving strength at 3rd layer with improved endurance as well noted. She continues to have difficulty with performing quick flicks more than 7 reps. She continues to be challenged with dual task performing Kegels with other activities. This is affecting overall pelvic floor function in order to maintain proper support of visceral structures and possibly contributing to her stress incontinence that happens on occasion. Patient has demonstrated good progress with PT interventions and overall set goals since start of PT. She will benefit from continued skilled PT for pelvic floor rehab to further improve PFM and core strength, improve PFM muscle control, improve proper breathing  mechanics, improve relaxation of PFM and glut musculature, and improve overall quality of life. Patient would benefit from skilled PT services to address these impairments and to maximize function.  Thank you for the referral.  Understanding of Dx/Px/POC: good     Prognosis: good    Goals  Impairment Goals 4-6 weeks   In order to maximize function patient will be able to...   - Patient will report reduced leakage with daily activities such as coughing, sneezing, etc.   - Improve PERF score to WNLs to improve PFM contraction with ADLs  - Increase core and PFM strength to 4/5 throughout to maintain proper stability and support for visceral structures  - Increase hip strength to 4/5 throughout to improve PFM and overall pelvic stability with ADLs  - Demonstrate improved hip flexibility as demonstrated by increased ROM through therapeutic exercise    Functional Goals 6-8 weeks  In order to return to prior level of function patient will be able to...   - Demonstrate independence and compliant with HEP  - Demonstrate functional activities with good core/glute/PF muscle strength without compensation/pain/difficulty   - Demonstrate a squat and or sit to stand with good mechanics and eccentric control without pain/difficulty/leakage  - Ascend and descend stairs without increased pain/difficulty/leakage and a reciprocal gait pattern.    Plan  Patient would benefit from: skilled physical therapy  Planned modality interventions: cryotherapy, electrical stimulation/Russian stimulation and thermotherapy: hydrocollator packs    Planned therapy interventions: balance/weight bearing training, body mechanics training, flexibility, functional ROM exercises, transfer training, home exercise program, therapeutic activities, therapeutic exercise, stretching, strengthening, massage, Bai taping, neuromuscular re-education, patient education, manual therapy, postural training, joint mobilization, IASTM, kinesiology taping and nerve  gliding    Frequency: 1-2x week  Duration in weeks: 4  Treatment plan discussed with: patient, PTA and referring physician        PT Pelvic Floor Subjective:   History of Present Illness:   Chela Apodaca is a 59 y.o. year-old female who presents to outpatient PT with reports of stress incontinence and occasionally with urgency. Patient reports her symptoms have been going on for many years. She reports history of bladder polyps many years ago. She reports she had a cystoscope and noticed her symptoms worsened since. She reports she has had repeated cystoscopes since with no more polyps revealed. Patient reports she has been on medication that has helped with her leakage, but notices leakage when she is not taking the medication. She saw urologist beginning of January and was recommended for PT at this time. Quality of life: good    Social Support:     Lives in:  Multiple-level home    Lives with:  Spouse    Relationship status: /committed    Work status: retired (retired; professor at [employer])    Life stress severity: moderate    History of Depression: yes  Diet and Exercise:    Diet:balanced nutrition    Exercise type: no activity    Not exercising due to: not interested  OB/ gyn History    Gestational History:     Prior Pregnancy: No      Menstrual History:      Menopausal: menopause    Birth control: no contraception  no hormone replacement therapy  Bladder Function:     Voiding Difficulties negative for: urgency       Voiding Difficulties comments:     Voiding frequency: every 3-4 hours    Urinary leakage: urine leakage    Urinary leakage aggravated by: coughing, post-void dribble (not as often) and unknown (occasionally)    Urinary leakage not aggravated by: sneezing and laughing    Painful urination: No      Fluid Intake Type:  Coffee and water    Intake (ounces): Water: 40, Coffee: 16,   Incontinence Management:     Pads/Diaper Use:  24 hours  Bowel Function:     Bowel frequency: daily  Pain:      No pain reported by patient.  Diagnostic Tests:     Cystoscopy: normal  Treatments:     Current treatment: physical therapy    Patient Goals:     Patient goals for therapy:  Improved bladder or bowel function, improved comfort, decreased interpersonal problems and improved quality of life      Objective     Passive Range of Motion     Additional Passive Range of Motion Details  Passive stretching bilateral LE moderate tightness hip adductor, hamstring, piriformis without pain    Strength/Myotome Testing     Left Hip   Planes of Motion   Flexion: 4-  Extension: 3  Abduction: 4-  Adduction: 3+  External rotation: 4-  Internal rotation: 3+    Right Hip   Planes of Motion   Flexion: 4-  Extension: 3  Abduction: 4-  Adduction: 3+  External rotation: 4-  Internal rotation: 3+      Abdominal Assessment:      Position: supine exam      General Perineum Exam:   perineum intact.     Visual Inspection of Perineum:   Excursion of perineal body in cephalad direction with contraction of pelvic floor muscles (PFM): good  Excursion of perineal body in caudal direction with relaxation of pelvic floor muscles (PFM): weak  Involuntary contraction with coughing: no  Involuntary relaxation with bearing down: no  Cough reflex: cough reflex  Sensation: intact    Pelvic Organ Prolapse   no pelvic organ prolapse  Perineal body inspection: within normal limits        Pelvic Floor Muscle Exam:     Muscle Contraction: well isolated   Breathing pattern with contraction: within normal limits   Pelvic floor muscle relaxation is complete.   90% pelvic floor relaxation   1 seconds required for complete relaxation.        PERFECT Score   Power right: 3+/5   Power left: 3+/5   Endurance (seconds to max): 8   Repetitions (before fatigue): 4   Fast flicks (in 10 seconds): 7       pelvic floor exam consent given by patient    Pelvic exam completed: vaginally                 POC Expires Auth Status Start Date Expiration Date PT Visit Limit    4/3/2025 No  "Auth Req   60 visits PCY   Date 2/5/2025 2/10/2025 2/17/2025 2/25/2025 3/3/2025   Used 1 2 3 4 5   Remaining 59 58 57 56 55      Diagnosis: stress incontinence, pelvic floor weakness   Precautions: anxiety/depression, hx of bladder polyps   Next Physician's Appt:   [employer] HEP:   Manuals 2/5 2/10 2/17 2/25 3/3   STM   DK, R piriformis     PROM        PERF 3/3/2/7    3+/8/4/7           Neuro Re-Ed        Biofeedback        PFM Contract  10x3\" relax between 10x3\" relax between 5\" 2x5, relax between    Quick Flicks  10x   10x ladders    TA ball press  10x3\" Bolster 3-5\" x15 cues Bolster 3\" x20    Glute Set  10x3\"      PPT   Supine 3\" 2x10     TA + PFM w/ hip ADD ball squeeze  No TA 10x3\" Bolster 3\" x15 Bolster + PFM contraction 2\" 2x5    TA + PFM w/ BKFO  Clams 20x      TA + PFM w/ H/L marches        TA+SLR  10x2 ea                      Ther Ex        SKTC   5-10\"x10 ea     DKTC w/ ball  20x      LTRs  10x ea  10x ea    Butterfly ADD Stretch    10\" x10 with relaxation    SHAN/ piriformis stretch   Piriformis stretch 5-10\" x10 ea SHAN bilat 10\"x5 ea, piriformis bilat 10\"x5 ea    Happy Baby        Standing adductor stretch        Child's Pose        SB pelvic floor circles        Standing L/S extensions   10x @ table                      Ther Activity              Bike for LE mobility             PFM w/ STS        TA + PFM w/ lifting/carrying tasks                Diaphragmatic Breathing        Bladder Diary Review NV TH      Pt Ed HEP, POC HEP HEP HEP POC   Re-Evaluation          DK   Modalities             Heat/ice (PRN)                                   "

## 2025-03-04 ENCOUNTER — RESULTS FOLLOW-UP (OUTPATIENT)
Age: 60
End: 2025-03-04

## 2025-03-04 ENCOUNTER — APPOINTMENT (OUTPATIENT)
Dept: LAB | Facility: CLINIC | Age: 60
End: 2025-03-04
Payer: COMMERCIAL

## 2025-03-04 LAB
ALBUMIN SERPL BCG-MCNC: 4.3 G/DL (ref 3.5–5)
ALP SERPL-CCNC: 89 U/L (ref 34–104)
ALT SERPL W P-5'-P-CCNC: 19 U/L (ref 7–52)
ANION GAP SERPL CALCULATED.3IONS-SCNC: 9 MMOL/L (ref 4–13)
AST SERPL W P-5'-P-CCNC: 16 U/L (ref 13–39)
BASOPHILS # BLD AUTO: 0.08 THOUSANDS/ÂΜL (ref 0–0.1)
BASOPHILS NFR BLD AUTO: 1 % (ref 0–1)
BILIRUB SERPL-MCNC: 0.45 MG/DL (ref 0.2–1)
BUN SERPL-MCNC: 17 MG/DL (ref 5–25)
CALCIUM SERPL-MCNC: 9.4 MG/DL (ref 8.4–10.2)
CHLORIDE SERPL-SCNC: 101 MMOL/L (ref 96–108)
CHOLEST SERPL-MCNC: 248 MG/DL (ref ?–200)
CO2 SERPL-SCNC: 27 MMOL/L (ref 21–32)
CREAT SERPL-MCNC: 0.77 MG/DL (ref 0.6–1.3)
EOSINOPHIL # BLD AUTO: 0.11 THOUSAND/ÂΜL (ref 0–0.61)
EOSINOPHIL NFR BLD AUTO: 2 % (ref 0–6)
ERYTHROCYTE [DISTWIDTH] IN BLOOD BY AUTOMATED COUNT: 13.9 % (ref 11.6–15.1)
EST. AVERAGE GLUCOSE BLD GHB EST-MCNC: 163 MG/DL
GFR SERPL CREATININE-BSD FRML MDRD: 85 ML/MIN/1.73SQ M
GLUCOSE P FAST SERPL-MCNC: 179 MG/DL (ref 65–99)
HBA1C MFR BLD: 7.3 %
HCT VFR BLD AUTO: 43.7 % (ref 34.8–46.1)
HDLC SERPL-MCNC: 63 MG/DL
HGB BLD-MCNC: 14.5 G/DL (ref 11.5–15.4)
IMM GRANULOCYTES # BLD AUTO: 0.04 THOUSAND/UL (ref 0–0.2)
IMM GRANULOCYTES NFR BLD AUTO: 1 % (ref 0–2)
LDLC SERPL CALC-MCNC: 153 MG/DL (ref 0–100)
LYMPHOCYTES # BLD AUTO: 2.69 THOUSANDS/ÂΜL (ref 0.6–4.47)
LYMPHOCYTES NFR BLD AUTO: 37 % (ref 14–44)
MCH RBC QN AUTO: 28.9 PG (ref 26.8–34.3)
MCHC RBC AUTO-ENTMCNC: 33.2 G/DL (ref 31.4–37.4)
MCV RBC AUTO: 87 FL (ref 82–98)
MONOCYTES # BLD AUTO: 0.44 THOUSAND/ÂΜL (ref 0.17–1.22)
MONOCYTES NFR BLD AUTO: 6 % (ref 4–12)
NEUTROPHILS # BLD AUTO: 3.95 THOUSANDS/ÂΜL (ref 1.85–7.62)
NEUTS SEG NFR BLD AUTO: 53 % (ref 43–75)
NONHDLC SERPL-MCNC: 185 MG/DL
NRBC BLD AUTO-RTO: 0 /100 WBCS
PLATELET # BLD AUTO: 265 THOUSANDS/UL (ref 149–390)
PMV BLD AUTO: 11.4 FL (ref 8.9–12.7)
POTASSIUM SERPL-SCNC: 4.4 MMOL/L (ref 3.5–5.3)
PROT SERPL-MCNC: 7.3 G/DL (ref 6.4–8.4)
RBC # BLD AUTO: 5.01 MILLION/UL (ref 3.81–5.12)
SODIUM SERPL-SCNC: 137 MMOL/L (ref 135–147)
TRIGL SERPL-MCNC: 158 MG/DL (ref ?–150)
TSH SERPL DL<=0.05 MIU/L-ACNC: 4.2 UIU/ML (ref 0.45–4.5)
WBC # BLD AUTO: 7.31 THOUSAND/UL (ref 4.31–10.16)

## 2025-03-05 ENCOUNTER — OFFICE VISIT (OUTPATIENT)
Age: 60
End: 2025-03-05
Payer: COMMERCIAL

## 2025-03-05 VITALS
HEART RATE: 80 BPM | TEMPERATURE: 97.6 F | OXYGEN SATURATION: 96 % | HEIGHT: 64 IN | DIASTOLIC BLOOD PRESSURE: 80 MMHG | WEIGHT: 206 LBS | BODY MASS INDEX: 35.17 KG/M2 | RESPIRATION RATE: 16 BRPM | SYSTOLIC BLOOD PRESSURE: 126 MMHG

## 2025-03-05 DIAGNOSIS — E11.9 TYPE 2 DIABETES MELLITUS WITHOUT COMPLICATION, WITHOUT LONG-TERM CURRENT USE OF INSULIN (HCC): Primary | ICD-10-CM

## 2025-03-05 PROCEDURE — 99214 OFFICE O/P EST MOD 30 MIN: CPT | Performed by: FAMILY MEDICINE

## 2025-03-05 NOTE — TELEPHONE ENCOUNTER
Patient contacted the office back in regards to results. Scheduled appt this afternoon with pcp to discuss further.

## 2025-03-05 NOTE — PROGRESS NOTES
"Name: Chela Apodaca      : 1965      MRN: [id number]  Encounter Provider: Rex Whalen DO  Encounter Date: 3/5/2025   Encounter department: [facility] PRIMARY CARE  :  Assessment & Plan  Type 2 diabetes mellitus without complication, without long-term current use of insulin ([facility])    Lab Results   Component Value Date    HGBA1C 7.3 (H) 2025     Orders:  •  semaglutide, 0.25 or 0.5 mg/dose, (Ozempic, 0.25 or 0.5 MG/DOSE,) 2 mg/3 mL injection pen; 0.25 mg under the skin every 7 days for 4 doses (28 days), THEN 0.5 mg under the skin every 7 days  •  semaglutide, 1 mg/dose, (Ozempic) 4 mg/3 mL injection pen; Inject 0.75 mL (1 mg total) under the skin once a week    We reviewed her labs.  He is in the diabetic range now since her pharmacy benefit provider would not approve any other medication.  Hopefully at this point they will approve the Ozempic.  I wrote prescriptions as noted above.  Will follow-up here in about 3 months we will check a fingerstick hemoglobin A1c.       History of Present Illness   Patient presents with:  Follow-up: Patient is here to discuss and follow up on blood work. No other concerns.  LR          Review of Systems   Constitutional: Negative.    HENT: Negative.     Eyes: Negative.    Respiratory: Negative.     Cardiovascular: Negative.    Gastrointestinal: Negative.    Endocrine: Negative.    Genitourinary: Negative.    Musculoskeletal: Negative.    Skin: Negative.    Allergic/Immunologic: Negative.    Neurological: Negative.    Hematological: Negative.    Psychiatric/Behavioral:  Positive for dysphoric mood.    All other systems reviewed and are negative.      Objective   /80 (BP Location: Left arm, Patient Position: Sitting, Cuff Size: Large)   Pulse 80   Temp 97.6 °F (36.4 °C) (Temporal)   Resp 16   Ht 5' 4\" (1.626 m)   Wt 93.4 kg (206 lb)   SpO2 96%   BMI 35.36 kg/m²      Physical Exam  Vitals and nursing note reviewed.   Constitutional:       " Appearance: She is well-developed.   HENT:      Head: Normocephalic and atraumatic.   Eyes:      Pupils: Pupils are equal, round, and reactive to light.   Cardiovascular:      Rate and Rhythm: Normal rate.   Pulmonary:      Effort: Pulmonary effort is normal.   Abdominal:      Palpations: Abdomen is soft.   Musculoskeletal:         General: Normal range of motion.      Cervical back: Normal range of motion and neck supple.   Skin:     General: Skin is warm.   Neurological:      Mental Status: She is alert and oriented to person, place, and time.   Psychiatric:         Behavior: Behavior normal.

## 2025-03-10 ENCOUNTER — OFFICE VISIT (OUTPATIENT)
Dept: PHYSICAL THERAPY | Facility: CLINIC | Age: 60
End: 2025-03-10
Payer: COMMERCIAL

## 2025-03-10 DIAGNOSIS — N39.3 STRESS INCONTINENCE: Primary | ICD-10-CM

## 2025-03-10 DIAGNOSIS — N81.89 PELVIC FLOOR WEAKNESS IN FEMALE: ICD-10-CM

## 2025-03-10 PROCEDURE — 97530 THERAPEUTIC ACTIVITIES: CPT | Performed by: PHYSICAL THERAPIST

## 2025-03-10 PROCEDURE — 97112 NEUROMUSCULAR REEDUCATION: CPT | Performed by: PHYSICAL THERAPIST

## 2025-03-10 PROCEDURE — 97110 THERAPEUTIC EXERCISES: CPT | Performed by: PHYSICAL THERAPIST

## 2025-03-10 NOTE — PROGRESS NOTES
"Daily Note     Today's date: 3/10/2025  Patient name: Chela Apodaca  : 1965  MRN: [id number]  Referring provider: Mart Montoya P*  Dx:   Encounter Diagnosis     ICD-10-CM    1. Stress incontinence  N39.3       2. Pelvic floor weakness in female  N81.89           Start Time: 0830  Stop Time: 0915  Total time in clinic (min): 45 minutes    Subjective: Patient reports she still leaks a little when starting pelvic floor contractions. She reports she feels like her pelvic floor is weakening, noticed one day she was only able to hold for 7-8 seconds.       Objective: See treatment diary below      Assessment: Tolerated treatment well. Educated patient on pain science and muscle fatigue etiology. Patient was able to hold Kegel for 10 seconds throughout session exercises today. Progressed PFM stability with BKFO and seated marches. She was challenged to maintain Kegel with seated marching, but improved with cuing and repetition. Patient exhibited good technique with therapeutic exercises and would benefit from continued PT      Plan: Continue per plan of care.  Progress treatment as tolerated.          POC Expires Auth Status Start Date Expiration Date PT Visit Limit    4/3/2025 No Auth Req   60 visits PCY   Date 2025 2/10/2025 2025 2025 3/3/2025   Used 1 2 3 4 5   Remaining 59 58 57 56 55      Diagnosis: stress incontinence, pelvic floor weakness   Precautions: anxiety/depression, hx of bladder polyps   Next Physician's Appt:   [employer] HEP:   Manuals 3/10 2/10 2/17 2/25 3/3   STM   DK, R piriformis     PROM        PERF     3+/8/           Neuro Re-Ed        Biofeedback        PFM Contract 10\" 2x5 relax between 10x3\" relax between 10x3\" relax between 5\" 2x5, relax between    Quick Flicks 10x ladders 10x   10x ladders    TA ball press  10x3\" Bolster 3-5\" x15 cues Bolster 3\" x20    Glute Set Seated 3\" x15 10x3\"      PPT   Supine 3\" 2x10     TA + PFM w/ hip ADD ball squeeze  No TA 10x3\" " "Bolster 3\" x15 Bolster + PFM contraction 2\" 2x5    TA + PFM w/ BKFO PFM + BKFO alt x10 ea Clams 20x      TA + PFM w/ H/L marches Seated PFM w/ march alt x5 ea cues       TA+SLR 15x ea 10x2 ea                      Ther Ex        SKTC   5-10\"x10 ea     DKTC w/ ball  20x      LTRs  10x ea  10x ea    Butterfly ADD Stretch 10\" x10 with relaxation   10\" x10 with relaxation    SHAN/ piriformis stretch SHAN bilat 10\"x5 ea, piriformis bilat 10\"x5 ea  Piriformis stretch 5-10\" x10 ea SHAN bilat 10\"x5 ea, piriformis bilat 10\"x5 ea    Happy Baby        Standing adductor stretch        Child's Pose        SB pelvic floor circles        Standing L/S extensions   10x @ table                      Ther Activity             Bike for LE mobility            PFM w/ STS        TA + PFM w/ lifting/carrying tasks                Diaphragmatic Breathing        Bladder Diary  TH      Pt Ed HEP HEP HEP HEP POC   Re-Evaluation         DK   Modalities            Heat/ice (PRN)                                 "

## 2025-03-17 ENCOUNTER — OFFICE VISIT (OUTPATIENT)
Dept: PHYSICAL THERAPY | Facility: CLINIC | Age: 60
End: 2025-03-17
Payer: COMMERCIAL

## 2025-03-17 DIAGNOSIS — N81.89 PELVIC FLOOR WEAKNESS IN FEMALE: ICD-10-CM

## 2025-03-17 DIAGNOSIS — N39.3 STRESS INCONTINENCE: Primary | ICD-10-CM

## 2025-03-17 PROCEDURE — 97112 NEUROMUSCULAR REEDUCATION: CPT | Performed by: PHYSICAL THERAPIST

## 2025-03-17 PROCEDURE — 97110 THERAPEUTIC EXERCISES: CPT | Performed by: PHYSICAL THERAPIST

## 2025-03-17 PROCEDURE — 97530 THERAPEUTIC ACTIVITIES: CPT | Performed by: PHYSICAL THERAPIST

## 2025-03-17 NOTE — PROGRESS NOTES
"Daily Note     Today's date: 3/17/2025  Patient name: Chela Apodaca  : 1965  MRN: [id number]  Referring provider: Mart Montoya P*  Dx:   Encounter Diagnosis     ICD-10-CM    1. Stress incontinence  N39.3       2. Pelvic floor weakness in female  N81.89           Start Time: 07  Stop Time: 08  Total time in clinic (min): 45 minutes    Subjective: Patient reports she feels her leakage is reducing. Notices it less when sneezing. She continues to have some leakage with coughing. She reports she feels stronger in her pelvic floor overall.      Objective: See treatment diary below      Assessment: Tolerated treatment well. Worked on pelvic floor contraction holds with glute and LE strengthening. Added hook-lying PFM contraction with bridges with good tolerance and ability to hold Kegel. Added PFM with STS with some difficulty to maintain Kegel, but improved with repetitions. Patient exhibited good technique with therapeutic exercises and would benefit from continued PT      Plan: Continue per plan of care.  Progress treatment as tolerated.          POC Expires Auth Status Start Date Expiration Date PT Visit Limit    4/3/2025 No Auth Req   60 visits PCY   Date 3/10/2025 3/17/2025 2025 2025 3/3/2025   Used 6 7 3 4 5   Remaining 54 53 57 56 55      Diagnosis: stress incontinence, pelvic floor weakness   Precautions: anxiety/depression, hx of bladder polyps   Next Physician's Appt:   [employer] 911 HEP:   Manuals 3/10 3/17 2/17 2/25 3/3   STM   DK, R piriformis     PROM        PERF     3+/8//7           Neuro Re-Ed        Biofeedback        PFM Contract 10\" 2x5 relax between  10x3\" relax between 5\" 2x5, relax between    Quick Flicks 10x ladders 10x ladders seated  10x ladders    TA ball press   Bolster 3-5\" x15 cues Bolster 3\" x20    Glute Set Seated 3\" x15 Seated 3\" x15      PPT   Supine 3\" 2x10     TA + PFM w/ hip ADD ball squeeze  Bolster + PFM contraction 5\" x10 Bolster 3\" x15 Bolster + PFM " "contraction 2\" 2x5    TA + PFM w/ BKFO PFM + BKFO alt x10 ea       TA + PFM w/ H/L marches Seated PFM w/ march alt x5 ea cues       PFM + Bridge  Standard 5\" x10      TA+SLR 15x ea 15x ea                      Ther Ex        SKTC   5-10\"x10 ea     DKTC w/ ball        LTRs  15x ea  10x ea    Butterfly ADD Stretch 10\" x10 with relaxation 10\" x10 with relaxation  10\" x10 with relaxation    SHAN/ piriformis stretch SHAN bilat 10\"x5 ea, piriformis bilat 10\"x5 ea SHAN bilat 10\"x5 ea, piriformis bilat 10\"x5 ea Piriformis stretch 5-10\" x10 ea SHAN bilat 10\"x5 ea, piriformis bilat 10\"x5 ea    Happy Baby        Standing adductor stretch        Child's Pose        SB pelvic floor circles        Standing L/S extensions   10x @ table                      Ther Activity            Bike for LE mobility           PFM w/ STS  Chair 2\" x10 cues      TA + PFM w/ lifting/carrying tasks                Diaphragmatic Breathing        Bladder Diary        Pt Ed HEP POC, HEP frequency HEP HEP POC   Re-Evaluation        DK   Modalities           Heat/ice (PRN)                                 "

## 2025-03-24 ENCOUNTER — OFFICE VISIT (OUTPATIENT)
Dept: PHYSICAL THERAPY | Facility: CLINIC | Age: 60
End: 2025-03-24
Payer: COMMERCIAL

## 2025-03-24 DIAGNOSIS — N81.89 PELVIC FLOOR WEAKNESS IN FEMALE: ICD-10-CM

## 2025-03-24 DIAGNOSIS — N39.3 STRESS INCONTINENCE: Primary | ICD-10-CM

## 2025-03-24 PROCEDURE — 97110 THERAPEUTIC EXERCISES: CPT

## 2025-03-24 PROCEDURE — 97112 NEUROMUSCULAR REEDUCATION: CPT

## 2025-03-24 PROCEDURE — 97530 THERAPEUTIC ACTIVITIES: CPT

## 2025-03-24 NOTE — PROGRESS NOTES
"Daily Note     Today's date: 3/24/2025  Patient name: Chela Apodaca  : 1965  MRN: [id number]  Referring provider: Mart Montoya P*  Dx:   Encounter Diagnosis     ICD-10-CM    1. Stress incontinence  N39.3       2. Pelvic floor weakness in female  N81.89                      Subjective: Pt states that she is still noticing improvement overall.  She was able to decrease her medication and has not had any issues.        Objective: See treatment diary below      Assessment: Tolerated treatment well.  Progressed pt with PF and hip strengthening as tolerated.  Pt challenged with clams with resistance and muscle fatigue noted.  Good ability and form to perform PFMC at top of bridge.  Dicussed HEP and progressions with pt reporting understanding.  Pt will benefit from continued therapy to improve PF and core/LE strength.  Pt progressing slowly towards her goals and will benefit from continued therapy.      Plan: Continue per plan of care.         POC Expires Auth Status Start Date Expiration Date PT Visit Limit    4/3/2025 No Auth Req   60 visits PCY   Date 3/10/2025 3/17/2025 3/24/2025 2025 3/3/2025   Used 6 7 8 4 5   Remaining 54 53 52 56 55      Diagnosis: stress incontinence, pelvic floor weakness   Precautions: anxiety/depression, hx of bladder polyps   Next Physician's Appt:   [employer]social HEP:   Manuals 3/10 3/17 3/24 2/25 3/3   STM        PROM        PERF     3+/8/           Neuro Re-Ed        Biofeedback        PFM Contract 10\" 2x5 relax between  10x 5\" standing 5\" 2x5, relax between    Quick Flicks 10x ladders 10x ladders seated 10x ladders H/L 10x ladders    TA ball press    Bolster 3\" x20    Glute Set Seated 3\" x15 Seated 3\" x15      PPT        TA + PFM w/ hip ADD ball squeeze  Bolster + PFM contraction 5\" x10 Bolster + PFMC   5\"x10 Bolster + PFM contraction 2\" 2x5    TA + PFM w/ BKFO PFM + BKFO alt x10 ea  Clams: GTB 15x     TA + PFM w/ H/L  Seated PFM w/ march alt x5 ea cues     " "  PFM + Bridge  Standard 5\" x10 PFM at top+ hold on down 10x     TA+SLR 15x ea 15x ea 15x ea                     Ther Ex        SKTC        DKTC w/ ball        LTRs  15x ea  10x ea    Butterfly ADD Stretch 10\" x10 with relaxation 10\" x10 with relaxation 10\"x10 w/ relaxation 10\" x10 with relaxation    SHAN/ piriformis stretch SHAN bilat 10\"x5 ea, piriformis bilat 10\"x5 ea SHAN bilat 10\"x5 ea, piriformis bilat 10\"x5 ea SHAN B/L 10\"x5 ea: piriformis B/L  10\"x5 ea SHAN bilat 10\"x5 ea, piriformis bilat 10\"x5 ea    Happy Baby        Standing adductor stretch        Child's Pose        SB pelvic floor circles        Standing L/S extensions                         Ther Activity            Bike for LE mobility           PFM w/ STS  Chair 2\" x10 cues      TA + PFM w/ lifting/carrying tasks                Diaphragmatic Breathing        Bladder Diary        Pt Ed HEP POC, HEP frequency HEP HEP POC   Re-Evaluation        DK   Modalities           Heat/ice (PRN)                                   "

## 2025-03-31 ENCOUNTER — EVALUATION (OUTPATIENT)
Dept: PHYSICAL THERAPY | Facility: CLINIC | Age: 60
End: 2025-03-31
Payer: COMMERCIAL

## 2025-03-31 DIAGNOSIS — N81.89 PELVIC FLOOR WEAKNESS IN FEMALE: ICD-10-CM

## 2025-03-31 DIAGNOSIS — N39.3 STRESS INCONTINENCE: Primary | ICD-10-CM

## 2025-03-31 DIAGNOSIS — F33.1 MAJOR DEPRESSIVE DISORDER, RECURRENT EPISODE, MODERATE (HCC): ICD-10-CM

## 2025-03-31 PROCEDURE — 97140 MANUAL THERAPY 1/> REGIONS: CPT | Performed by: PHYSICAL THERAPIST

## 2025-03-31 PROCEDURE — 97530 THERAPEUTIC ACTIVITIES: CPT | Performed by: PHYSICAL THERAPIST

## 2025-03-31 RX ORDER — ESCITALOPRAM OXALATE 10 MG/1
10 TABLET ORAL DAILY
Qty: 90 TABLET | Refills: 1 | Status: SHIPPED | OUTPATIENT
Start: 2025-03-31

## 2025-03-31 NOTE — PROGRESS NOTES
PT Discharge    Today's date: 3/31/2025  Patient name: Chela Apodaca  : 1965  MRN: [id number]  Referring provider: Mart Montoya P*  Dx:   Encounter Diagnosis     ICD-10-CM    1. Stress incontinence  N39.3       2. Pelvic floor weakness in female  N81.89               Start Time: 0745  Stop Time: 0816  Total time in clinic (min): 31 minutes    Assessment    Assessment details: Chela Apodaca is a 59 y.o. year-old female who presents with reports of leakage/incontinence with stress related activities such as coughing, sneezing, or other extreme/sudden activities or movements. Patient has attended PT for the past 2 months with reports of improving pelvic floor function and strength overall. She reports recently in the past week she has noticed slight increase in leakage She reports she recently stopped medication (trospium chloride) and has recently started Ozempic. She reports prior to recent mediation changes, noted improved and reduced leakage with sneezing and overall improve PFM strength and motor control. She reports better ability getting out of bed and in general. Internal assessment of pelvic floor muscles not performed today per patient's request. Grossly assessed externally revealed improving strength and isolation, with improved endurance as well noted. She continues to be challenged with dual task performing Kegels with other activities. However has improved with practice and HEP exercises that incorporate this. Patient has demonstrated good progress with PT interventions and overall set goals since start of PT. Patient will be discharged to [facility] to maintain PT gains made thus far. She was instructed to contact this office with any questions or concerns in the future.   Understanding of Dx/Px/POC: good     Prognosis: good    Goals  Impairment Goals 4-6 weeks   In order to maximize function patient will be able to...   - Patient will report reduced leakage with daily activities such as  coughing, sneezing, etc. Partially Met  - Improve PERF score to WNLs to improve PFM contraction with ADLs. Met  - Increase core and PFM strength to 4/5 throughout to maintain proper stability and support for visceral structures. Met  - Increase hip strength to 4/5 throughout to improve PFM and overall pelvic stability with ADLs. Met  - Demonstrate improved hip flexibility as demonstrated by increased ROM through therapeutic exercise. Met    Functional Goals 6-8 weeks  In order to return to prior level of function patient will be able to...   - Demonstrate independence and compliant with HEP. Met  - Demonstrate functional activities with good core/glute/PF muscle strength without compensation/pain/difficulty. Met  - Demonstrate a squat and or sit to stand with good mechanics and eccentric control without pain/difficulty/leakage. Partially Met  - Ascend and descend stairs without increased pain/difficulty/leakage and a reciprocal gait pattern. Partially Met     Plan    Frequency: 1x week  Duration in weeks: 1  Treatment plan discussed with: patient and PTA        PT Pelvic Floor Subjective:   History of Present Illness:   Chela Apodaca is a 59 y.o. year-old female who presents to outpatient PT with reports of stress incontinence and occasionally with urgency. Patient reports her symptoms have been going on for many years. She reports history of bladder polyps many years ago. She reports she had a cystoscope and noticed her symptoms worsened since. She reports she has had repeated cystoscopes since with no more polyps revealed. Patient reports she has been on medication that has helped with her leakage, but notices leakage when she is not taking the medication. She saw urologist beginning of January and was recommended for PT at this time. Quality of life: good    Social Support:     Lives in:  Multiple-level home    Lives with:  Spouse    Relationship status: /committed    Work status: retired (retired;  professor at [employer])    Life stress severity: moderate    History of Depression: yes  Diet and Exercise:    Diet:balanced nutrition    Exercise type: no activity    Not exercising due to: not interested  OB/ gyn History    Gestational History:     Prior Pregnancy: No      Menstrual History:      Menopausal: menopause    Birth control: no contraception  no hormone replacement therapy  Bladder Function:     Voiding Difficulties negative for: urgency       Voiding Difficulties comments:     Voiding frequency: every 3-4 hours    Urinary leakage: urine leakage    Urinary leakage aggravated by: unknown (occasionally)    Urinary leakage not aggravated by: coughing, sneezing, post-void dribble (not as often) and laughing    Painful urination: No      Fluid Intake Type:  Coffee and water    Intake (ounces): Water: 40, Coffee: 16,   Incontinence Management:     Pads/Diaper Use:  24 hours  Bowel Function:     Bowel frequency: daily  Pain:     No pain reported by patient.  Diagnostic Tests:     Cystoscopy: normal  Treatments:     Current treatment: physical therapy    Patient Goals:     Patient goals for therapy:  Improved bladder or bowel function, improved comfort, decreased interpersonal problems and improved quality of life      Objective     Passive Range of Motion     Additional Passive Range of Motion Details  Passive stretching bilateral LE moderate tightness hip adductor, hamstring, piriformis without pain    Strength/Myotome Testing     Left Hip   Planes of Motion   Flexion: 4  Extension: 4-  Abduction: 4  Adduction: 4-  External rotation: 4  Internal rotation: 4-    Right Hip   Planes of Motion   Flexion: 4  Extension: 4-  Abduction: 4  Adduction: 4-  External rotation: 4  Internal rotation: 4-      Abdominal Assessment:      Position: supine exam      General Perineum Exam:   perineum intact.     Visual Inspection of Perineum:   Excursion of perineal body in cephalad direction with contraction of pelvic floor  "muscles (PFM): good  Excursion of perineal body in caudal direction with relaxation of pelvic floor muscles (PFM): weak  Involuntary contraction with coughing: no  Involuntary relaxation with bearing down: no  Cough reflex: cough reflex  Sensation: intact    Pelvic Organ Prolapse   no pelvic organ prolapse  Perineal body inspection: within normal limits        Pelvic Floor Muscle Exam:     Muscle Contraction: well isolated   Breathing pattern with contraction: within normal limits   Pelvic floor muscle relaxation is complete.   100% pelvic floor relaxation   1 seconds required for complete relaxation.        PERFECT Score   Power right: 4/5   Power left: 4/5   Endurance (seconds to max): 10   Repetitions (before fatigue): 5   Fast flicks (in 10 seconds): 8       pelvic floor exam consent given by patient    Pelvic exam completed: vaginally                 POC Expires Auth Status Start Date Expiration Date PT Visit Limit    4/3/2025 No Auth Req   60 visits PCY   Date 3/10/2025 3/17/2025 3/24/2025 3/31/2025    Used 6 7 8 9    Remaining 54 53 52 51       Diagnosis: stress incontinence, pelvic floor weakness   Precautions: anxiety/depression, hx of bladder polyps   Next Physician's Appt:   [email or web address] HEP:   Manuals 3/10 3/17 3/24 3/31    STM        PROM        PERF    Ext: 4/10/5/8            Neuro Re-Ed        Biofeedback        PFM Contract 10\" 2x5 relax between  10x 5\" standing     Quick Flicks 10x ladders 10x ladders seated 10x ladders H/L     TA ball press        Glute Set Seated 3\" x15 Seated 3\" x15      PPT        TA + PFM w/ hip ADD ball squeeze  Bolster + PFM contraction 5\" x10 Bolster + PFMC   5\"x10     TA + PFM w/ BKFO PFM + BKFO alt x10 ea  Clams: GTB 15x     TA + PFM w/ H/L marches Seated PFM w/ march alt x5 ea cues       PFM + Bridge  Standard 5\" x10 PFM at top+ hold on down 10x     TA+SLR 15x ea 15x ea 15x ea                     Ther Ex        SKTC        DKTC w/ ball        LTRs  15x ea      Butterfly ADD " "Stretch 10\" x10 with relaxation 10\" x10 with relaxation 10\"x10 w/ relaxation     SHAN/ piriformis stretch SHAN bilat 10\"x5 ea, piriformis bilat 10\"x5 ea SHAN bilat 10\"x5 ea, piriformis bilat 10\"x5 ea SHAN B/L 10\"x5 ea: piriformis B/L  10\"x5 ea     Happy Baby        Standing adductor stretch        Child's Pose        SB pelvic floor circles        Standing L/S extensions                         Ther Activity          Bike for LE mobility         PFM w/ STS  Chair 2\" x10 cues      TA + PFM w/ lifting/carrying tasks                Diaphragmatic Breathing        Bladder Diary        Pt Ed HEP POC, HEP frequency HEP POC    Re-Evaluation     DK, D/C    Modalities          Heat/ice (PRN)                             "

## 2025-04-29 ENCOUNTER — TELEPHONE (OUTPATIENT)
Age: 60
End: 2025-04-29

## 2025-04-29 NOTE — TELEPHONE ENCOUNTER
Monitor: The problem is stable. Evaluation: No labs/tests required today.   Assessment/Treatment:  Follow up in 6 months Patient will be next due for A1c check in June, keeping dose the same or increasing is per PCP discretion.  If patient is out of medication, can provide refill of current dose. full weight-bearing

## 2025-04-29 NOTE — TELEPHONE ENCOUNTER
Left message for patient to call the office back to . Give  instructions and to see if she is completely out of the medication.

## 2025-04-29 NOTE — TELEPHONE ENCOUNTER
Patient  would like to know if her Rx: Ozempic has to be increase and if blood work is needed to recheck her A1c  Patient requesting refills to be sent to [facility].  Next OV 5/26/25    Please review and advice  Thank you

## 2025-04-29 NOTE — TELEPHONE ENCOUNTER
Patient called and stated she has 2 doses of Ozempic left, one for this Thursday and one for next week. She stated she has enough left until Dr Whalen comes back and he can decide if he would like to increase the dose or not at that time.

## 2025-05-13 ENCOUNTER — TELEPHONE (OUTPATIENT)
Age: 60
End: 2025-05-13

## 2025-05-13 DIAGNOSIS — E11.9 TYPE 2 DIABETES MELLITUS WITHOUT COMPLICATION, WITHOUT LONG-TERM CURRENT USE OF INSULIN (HCC): ICD-10-CM

## 2025-05-13 NOTE — TELEPHONE ENCOUNTER
Patient called the RX Refill Line. Message is being forwarded to the office.     Patient is requesting a refill on Ozempic. She would like to increase to the 7.5 dose. Patient is due for her next injection on Thursday 5/15/25. Please send script to [facility] in [place] on [address].     Please contact patient at [phone].

## 2025-05-14 NOTE — TELEPHONE ENCOUNTER
Patient called to confirm the dosages she had been on as well as if she is gradually increasing. Patient stated the prescription she had just picked up seemed to be a big increase. When reviewing patients medication list I discussed with her the previous refill of the Ozempic. Patient stated she then realized she had never picked up the previous script of the 0.75 mL dose. Patient stated she will contact her pharmacy at this time.

## 2025-05-29 ENCOUNTER — OFFICE VISIT (OUTPATIENT)
Age: 60
End: 2025-05-29
Payer: COMMERCIAL

## 2025-05-29 VITALS
WEIGHT: 197 LBS | BODY MASS INDEX: 33.63 KG/M2 | HEIGHT: 64 IN | TEMPERATURE: 98.3 F | OXYGEN SATURATION: 96 % | SYSTOLIC BLOOD PRESSURE: 140 MMHG | HEART RATE: 83 BPM | DIASTOLIC BLOOD PRESSURE: 90 MMHG

## 2025-05-29 DIAGNOSIS — E11.9 TYPE 2 DIABETES MELLITUS WITHOUT COMPLICATION, WITHOUT LONG-TERM CURRENT USE OF INSULIN (HCC): Primary | ICD-10-CM

## 2025-05-29 DIAGNOSIS — E66.811 OBESITY (BMI 30.0-34.9): ICD-10-CM

## 2025-05-29 PROCEDURE — 99214 OFFICE O/P EST MOD 30 MIN: CPT | Performed by: FAMILY MEDICINE

## 2025-05-29 RX ORDER — TIRZEPATIDE 5 MG/.5ML
5 INJECTION, SOLUTION SUBCUTANEOUS WEEKLY
Qty: 2 ML | Refills: 0 | Status: SHIPPED | OUTPATIENT
Start: 2025-05-29

## 2025-05-29 NOTE — ASSESSMENT & PLAN NOTE
Orders:  •  Tirzepatide (Mounjaro) 5 MG/0.5ML SOAJ; Inject 5 mg under the skin once a week  •  Comprehensive metabolic panel; Future  •  Hemoglobin A1C; Future  •  Lipid Panel with Direct LDL reflex; Future

## 2025-05-29 NOTE — PROGRESS NOTES
"Name: Chela Apodaca      : 1965      MRN: [id number]  Encounter Provider: Rex Whalen DO  Encounter Date: 2025   Encounter department: [facility]  :  Assessment & Plan  Type 2 diabetes mellitus without complication, without long-term current use of insulin ([facility])    Lab Results   Component Value Date    HGBA1C 7.3 (H) 2025       Orders:  •  Tirzepatide (Mounjaro) 5 MG/0.5ML SOAJ; Inject 5 mg under the skin once a week  •  Albumin / creatinine urine ratio; Future  •  Comprehensive metabolic panel; Future  •  Hemoglobin A1C; Future  •  Lipid Panel with Direct LDL reflex; Future    Obesity (BMI 30.0-34.9)      Orders:  •  Tirzepatide (Mounjaro) 5 MG/0.5ML SOAJ; Inject 5 mg under the skin once a week  •  Comprehensive metabolic panel; Future  •  Hemoglobin A1C; Future  •  Lipid Panel with Direct LDL reflex; Future           History of Present Illness   Patient presents with:  Diabetes: Follow up Ozempic she is currently taking 1 mg, side effects  - diarrhea, dizziness, nausea, HA        Diabetes  Hypoglycemia symptoms include dizziness and headaches.     Review of Systems   Constitutional: Negative.    HENT: Negative.     Eyes: Negative.    Respiratory: Negative.     Cardiovascular: Negative.    Gastrointestinal:  Positive for diarrhea and nausea.   Endocrine: Negative.    Genitourinary: Negative.    Musculoskeletal: Negative.    Skin: Negative.    Allergic/Immunologic: Negative.    Neurological:  Positive for dizziness and headaches.   Hematological: Negative.    Psychiatric/Behavioral: Negative.     All other systems reviewed and are negative.      Objective   /90 (BP Location: Right arm, Patient Position: Sitting, Cuff Size: Standard)   Pulse 83   Temp 98.3 °F (36.8 °C)   Ht 5' 4\" (1.626 m)   Wt 89.4 kg (197 lb)   SpO2 96%   BMI 33.81 kg/m²      Physical Exam  Vitals and nursing note reviewed.   Constitutional:       Appearance: She is well-developed. She " is obese.   HENT:      Head: Normocephalic.     Eyes:      Pupils: Pupils are equal, round, and reactive to light.       Cardiovascular:      Rate and Rhythm: Normal rate and regular rhythm.      Pulses: no weak pulses.           Dorsalis pedis pulses are 2+ on the right side and 2+ on the left side.        Posterior tibial pulses are 2+ on the right side and 2+ on the left side.      Heart sounds: Normal heart sounds.   Pulmonary:      Effort: Pulmonary effort is normal.      Breath sounds: Normal breath sounds.   Abdominal:      General: Bowel sounds are normal.      Palpations: Abdomen is soft.     Musculoskeletal:      Cervical back: Normal range of motion and neck supple.   Feet:      Right foot:      Skin integrity: No ulcer, skin breakdown, erythema, warmth, callus or dry skin.      Left foot:      Skin integrity: No ulcer, skin breakdown, erythema, warmth, callus or dry skin.     Skin:     General: Skin is warm and dry.      Capillary Refill: Capillary refill takes less than 2 seconds.     Neurological:      General: No focal deficit present.      Mental Status: She is alert and oriented to person, place, and time.     Psychiatric:         Mood and Affect: Mood normal.         Behavior: Behavior normal.     Diabetic Foot Exam    Patient's shoes and socks removed.    Right Foot/Ankle   Right Foot Inspection  Skin Exam: skin normal and skin intact. No dry skin, no warmth, no callus, no erythema, no maceration, no abnormal color, no pre-ulcer, no ulcer and no callus.     Toe Exam: ROM and strength within normal limits.     Sensory   Vibration: intact  Proprioception: intact  Monofilament testing: intact    Vascular  Capillary refills: < 3 seconds  The right DP pulse is 2+. The right PT pulse is 2+.     Left Foot/Ankle  Left Foot Inspection  Skin Exam: skin normal and skin intact. No dry skin, no warmth, no erythema, no maceration, normal color, no pre-ulcer, no ulcer and no callus.     Toe Exam: ROM and  strength within normal limits.     Sensory   Vibration: intact  Proprioception: intact  Monofilament testing: intact    Vascular  Capillary refills: < 3 seconds  The left DP pulse is 2+. The left PT pulse is 2+.     Assign Risk Category  No deformity present  No loss of protective sensation  No weak pulses  Risk: 0

## 2025-06-26 DIAGNOSIS — E66.811 OBESITY (BMI 30.0-34.9): ICD-10-CM

## 2025-06-26 DIAGNOSIS — E11.9 TYPE 2 DIABETES MELLITUS WITHOUT COMPLICATION, WITHOUT LONG-TERM CURRENT USE OF INSULIN (HCC): ICD-10-CM

## 2025-06-26 RX ORDER — TIRZEPATIDE 5 MG/.5ML
INJECTION, SOLUTION SUBCUTANEOUS
Qty: 2 ML | Refills: 1 | Status: SHIPPED | OUTPATIENT
Start: 2025-06-26

## 2025-07-22 ENCOUNTER — OFFICE VISIT (OUTPATIENT)
Age: 60
End: 2025-07-22
Payer: COMMERCIAL

## 2025-07-22 ENCOUNTER — NURSE TRIAGE (OUTPATIENT)
Age: 60
End: 2025-07-22

## 2025-07-22 VITALS
WEIGHT: 197 LBS | HEART RATE: 88 BPM | RESPIRATION RATE: 18 BRPM | TEMPERATURE: 97.9 F | OXYGEN SATURATION: 99 % | BODY MASS INDEX: 33.63 KG/M2 | DIASTOLIC BLOOD PRESSURE: 82 MMHG | SYSTOLIC BLOOD PRESSURE: 122 MMHG | HEIGHT: 64 IN

## 2025-07-22 DIAGNOSIS — R10.32 LEFT LOWER QUADRANT ABDOMINAL PAIN: Primary | ICD-10-CM

## 2025-07-22 PROCEDURE — 99214 OFFICE O/P EST MOD 30 MIN: CPT | Performed by: FAMILY MEDICINE

## 2025-07-22 RX ORDER — LEVOFLOXACIN 500 MG/1
500 TABLET, FILM COATED ORAL EVERY 24 HOURS
Qty: 7 TABLET | Refills: 0 | Status: SHIPPED | OUTPATIENT
Start: 2025-07-22 | End: 2025-07-29

## 2025-07-22 NOTE — TELEPHONE ENCOUNTER
"REASON FOR CONVERSATION: Medication Reaction    SYMPTOMS: low back pain, bilateral radiates into kidney area (notices more on left side)    OTHER HEALTH INFORMATION: Symptoms started when switching to new medication. Dull, continuous, deep pain. 4/5 pain. Denies N/V, fever/chills.     PROTOCOL DISPOSITION: Discuss With PCP and Callback by Nurse Within 1 Hour    CARE ADVICE PROVIDED: will wait for return phone call    PRACTICE FOLLOW-UP: Asking for pcp recommendation at this time, wanting to know if this is related to switching as it started once switching. Patient will wait for return phone call.         Reason for Disposition   Caller has URGENT medicine question about med that PCP or specialist prescribed and triager unable to answer question    Answer Assessment - Initial Assessment Questions  1. NAME of MEDICINE: \"What medicine(s) are you calling about?\"      Tirzepatide (Mounjaro) 5 MG/0.5ML SOAJ  2. QUESTION: \"What is your question?\" (e.g., double dose of medicine, side effect)      Side effect  3. PRESCRIBER: \"Who prescribed the medicine?\" Reason: if prescribed by specialist, call should be referred to that group.      PCP  4. SYMPTOMS: \"Do you have any symptoms?\" If Yes, ask: \"What symptoms are you having?\"  \"How bad are the symptoms (e.g., mild, moderate, severe)      Started once switching to mounjaro, low back, kidney pain, mostly left side but bilateral  5. PREGNANCY:  \"Is there any chance that you are pregnant?\" \"When was your last menstrual period?\"      N/A    Protocols used: Medication Question Call-Adult-[place]    "

## 2025-07-22 NOTE — PROGRESS NOTES
"Name: Chela Apodaca      : 1965      MRN: [id number]  Encounter Provider: Rex Whalen DO  Encounter Date: 2025   Encounter department: [facility] PRIMARY [facility]  :  Assessment & Plan  Left lower quadrant abdominal pain    Orders:  •  levofloxacin (LEVAQUIN) 500 mg tablet; Take 1 tablet (500 mg total) by mouth every 24 hours for 7 days  •  CT renal stone study abdomen pelvis wo contrast; Future    Will rule out kidney stone.  I asked her to hold the Mounjaro till her pain subsides.  Recommend emergency department if the pain becomes severe.       History of Present Illness   Chela complains of significant left lower quadrant abdominal pain radiating to the left flank.  Also radiates down her leg.  This has been going on for 10 days or so.  Pain became worse last night with pain across the back.  She did start Mounjaro just before this pain started.    Back Pain  Pertinent negatives include no fever.     Review of Systems   Constitutional:  Positive for activity change. Negative for fever.   Respiratory: Negative.     Cardiovascular: Negative.    Gastrointestinal:         As noted in HPI.   Musculoskeletal:  Positive for back pain.       Objective   /82 (BP Location: Left arm, Patient Position: Sitting, Cuff Size: Large)   Pulse 88   Temp 97.9 °F (36.6 °C) (Temporal)   Resp 18   Ht 5' 4\" (1.626 m)   Wt 89.4 kg (197 lb)   SpO2 99%   BMI 33.81 kg/m²      Physical Exam  Vitals and nursing note reviewed.   Constitutional:       Appearance: She is well-developed.   HENT:      Head: Normocephalic and atraumatic.     Eyes:      Pupils: Pupils are equal, round, and reactive to light.       Cardiovascular:      Rate and Rhythm: Normal rate.   Pulmonary:      Effort: Pulmonary effort is normal.   Abdominal:      Palpations: Abdomen is soft. There is no mass.      Tenderness: There is no abdominal tenderness. There is left CVA tenderness. There is no guarding or rebound. "     Musculoskeletal:         General: Normal range of motion.      Cervical back: Normal range of motion and neck supple.     Skin:     General: Skin is warm.     Neurological:      Mental Status: She is alert and oriented to person, place, and time.     Psychiatric:         Behavior: Behavior normal.

## 2025-07-22 NOTE — TELEPHONE ENCOUNTER
Patient called again. Requested to schedule appointment with Dr Whalen.  Scheduled for 7:30 pm this evening.

## 2025-07-23 ENCOUNTER — TELEPHONE (OUTPATIENT)
Age: 60
End: 2025-07-23

## 2025-07-23 ENCOUNTER — HOSPITAL ENCOUNTER (OUTPATIENT)
Dept: CT IMAGING | Facility: HOSPITAL | Age: 60
Discharge: HOME | End: 2025-07-23
Attending: FAMILY MEDICINE
Payer: COMMERCIAL

## 2025-07-23 DIAGNOSIS — R10.32 LEFT LOWER QUADRANT ABDOMINAL PAIN: ICD-10-CM

## 2025-07-23 DIAGNOSIS — R10.32 LEFT LOWER QUADRANT ABDOMINAL PAIN: Primary | ICD-10-CM

## 2025-07-23 PROCEDURE — 74176 CT ABD & PELVIS W/O CONTRAST: CPT

## 2025-07-23 NOTE — TELEPHONE ENCOUNTER
I changed pt's order to STAT.  She now has an appt today 7/23/25 @ 10:15 in [place].  Pt notified

## 2025-07-23 NOTE — TELEPHONE ENCOUNTER
Patient called and stated that the CAT Scan was not able to be scheduled until Tuesday because STAT was not on the order.  She wanted to make sure that is what the Primary Care Provider intended.  She stated that he told her he would be around in the morning but gone after that.  She is asking us to reach him before he leaves.

## 2025-08-22 ENCOUNTER — TELEPHONE (OUTPATIENT)
Age: 60
End: 2025-08-22